# Patient Record
Sex: MALE | Race: WHITE | Employment: FULL TIME | URBAN - NONMETROPOLITAN AREA
[De-identification: names, ages, dates, MRNs, and addresses within clinical notes are randomized per-mention and may not be internally consistent; named-entity substitution may affect disease eponyms.]

---

## 2017-01-09 ENCOUNTER — NURSE ONLY (OUTPATIENT)
Dept: INTERNAL MEDICINE CLINIC | Age: 57
End: 2017-01-09

## 2017-01-09 DIAGNOSIS — Z11.59 NEED FOR HEPATITIS C SCREENING TEST: ICD-10-CM

## 2017-01-09 DIAGNOSIS — Z11.4 SCREENING FOR HIV (HUMAN IMMUNODEFICIENCY VIRUS): ICD-10-CM

## 2017-01-09 DIAGNOSIS — I10 ESSENTIAL HYPERTENSION: ICD-10-CM

## 2017-01-09 DIAGNOSIS — Z00.00 ROUTINE HEALTH MAINTENANCE: ICD-10-CM

## 2017-01-09 DIAGNOSIS — Z12.5 SCREENING FOR PROSTATE CANCER: ICD-10-CM

## 2017-01-09 LAB
A/G RATIO: 1.7 (ref 1.1–2.2)
ALBUMIN SERPL-MCNC: 4.4 G/DL (ref 3.4–5)
ALP BLD-CCNC: 62 U/L (ref 40–129)
ALT SERPL-CCNC: 21 U/L (ref 10–40)
ANION GAP SERPL CALCULATED.3IONS-SCNC: 13 MMOL/L (ref 3–16)
AST SERPL-CCNC: 19 U/L (ref 15–37)
BASOPHILS ABSOLUTE: 0.1 K/UL (ref 0–0.2)
BASOPHILS RELATIVE PERCENT: 1 %
BILIRUB SERPL-MCNC: 0.5 MG/DL (ref 0–1)
BUN BLDV-MCNC: 15 MG/DL (ref 7–20)
CALCIUM SERPL-MCNC: 9.5 MG/DL (ref 8.3–10.6)
CHLORIDE BLD-SCNC: 98 MMOL/L (ref 99–110)
CHOLESTEROL, TOTAL: 198 MG/DL (ref 0–199)
CO2: 25 MMOL/L (ref 21–32)
CREAT SERPL-MCNC: 1 MG/DL (ref 0.9–1.3)
EOSINOPHILS ABSOLUTE: 0.2 K/UL (ref 0–0.6)
EOSINOPHILS RELATIVE PERCENT: 2.3 %
GFR AFRICAN AMERICAN: >60
GFR NON-AFRICAN AMERICAN: >60
GLOBULIN: 2.6 G/DL
GLUCOSE BLD-MCNC: 100 MG/DL (ref 70–99)
HCT VFR BLD CALC: 47.2 % (ref 40.5–52.5)
HDLC SERPL-MCNC: 59 MG/DL (ref 40–60)
HEMOGLOBIN: 15.9 G/DL (ref 13.5–17.5)
HEPATITIS C ANTIBODY INTERPRETATION: NORMAL
LDL CHOLESTEROL CALCULATED: 111 MG/DL
LYMPHOCYTES ABSOLUTE: 2.4 K/UL (ref 1–5.1)
LYMPHOCYTES RELATIVE PERCENT: 22.6 %
MCH RBC QN AUTO: 30.6 PG (ref 26–34)
MCHC RBC AUTO-ENTMCNC: 33.6 G/DL (ref 31–36)
MCV RBC AUTO: 91.2 FL (ref 80–100)
MONOCYTES ABSOLUTE: 1 K/UL (ref 0–1.3)
MONOCYTES RELATIVE PERCENT: 9.8 %
NEUTROPHILS ABSOLUTE: 6.7 K/UL (ref 1.7–7.7)
NEUTROPHILS RELATIVE PERCENT: 64.3 %
PDW BLD-RTO: 13.9 % (ref 12.4–15.4)
PLATELET # BLD: 236 K/UL (ref 135–450)
PMV BLD AUTO: 8.8 FL (ref 5–10.5)
POTASSIUM SERPL-SCNC: 4.9 MMOL/L (ref 3.5–5.1)
PROSTATE SPECIFIC ANTIGEN: 0.49 NG/ML (ref 0–4)
RBC # BLD: 5.17 M/UL (ref 4.2–5.9)
SODIUM BLD-SCNC: 136 MMOL/L (ref 136–145)
TOTAL PROTEIN: 7 G/DL (ref 6.4–8.2)
TRIGL SERPL-MCNC: 141 MG/DL (ref 0–150)
TSH REFLEX FT4: 1.84 UIU/ML (ref 0.27–4.2)
VLDLC SERPL CALC-MCNC: 28 MG/DL
WBC # BLD: 10.4 K/UL (ref 4–11)

## 2017-01-09 PROCEDURE — 36415 COLL VENOUS BLD VENIPUNCTURE: CPT | Performed by: INTERNAL MEDICINE

## 2017-01-10 LAB — HIV-1 AND HIV-2 ANTIBODIES: NORMAL

## 2017-01-12 ENCOUNTER — NURSE ONLY (OUTPATIENT)
Dept: INTERNAL MEDICINE CLINIC | Age: 57
End: 2017-01-12

## 2017-01-12 DIAGNOSIS — D68.2 FACTOR V DEFICIENCY (HCC): Primary | ICD-10-CM

## 2017-01-12 LAB
INTERNATIONAL NORMALIZATION RATIO, POC: 1.7
PROTHROMBIN TIME, POC: ABNORMAL

## 2017-01-12 PROCEDURE — 85610 PROTHROMBIN TIME: CPT | Performed by: INTERNAL MEDICINE

## 2017-01-23 ENCOUNTER — OFFICE VISIT (OUTPATIENT)
Dept: INTERNAL MEDICINE CLINIC | Age: 57
End: 2017-01-23

## 2017-01-23 VITALS
WEIGHT: 282 LBS | SYSTOLIC BLOOD PRESSURE: 132 MMHG | DIASTOLIC BLOOD PRESSURE: 87 MMHG | HEART RATE: 78 BPM | RESPIRATION RATE: 14 BRPM | OXYGEN SATURATION: 96 % | BODY MASS INDEX: 37.37 KG/M2 | HEIGHT: 73 IN

## 2017-01-23 DIAGNOSIS — E78.00 HYPERCHOLESTEROLEMIA: ICD-10-CM

## 2017-01-23 DIAGNOSIS — R73.01 IMPAIRED FASTING GLUCOSE: ICD-10-CM

## 2017-01-23 DIAGNOSIS — E66.09 NON MORBID OBESITY DUE TO EXCESS CALORIES: ICD-10-CM

## 2017-01-23 DIAGNOSIS — I10 ESSENTIAL HYPERTENSION: ICD-10-CM

## 2017-01-23 DIAGNOSIS — D68.2 FACTOR V DEFICIENCY (HCC): Primary | ICD-10-CM

## 2017-01-23 LAB
INTERNATIONAL NORMALIZATION RATIO, POC: 3.3
PROTHROMBIN TIME, POC: ABNORMAL

## 2017-01-23 PROCEDURE — 99214 OFFICE O/P EST MOD 30 MIN: CPT | Performed by: INTERNAL MEDICINE

## 2017-01-23 PROCEDURE — 85610 PROTHROMBIN TIME: CPT | Performed by: INTERNAL MEDICINE

## 2017-02-02 ENCOUNTER — NURSE ONLY (OUTPATIENT)
Dept: INTERNAL MEDICINE CLINIC | Age: 57
End: 2017-02-02

## 2017-02-02 DIAGNOSIS — D68.2 FACTOR V DEFICIENCY (HCC): Primary | ICD-10-CM

## 2017-02-02 LAB
INTERNATIONAL NORMALIZATION RATIO, POC: 2.2
PROTHROMBIN TIME, POC: NORMAL

## 2017-02-02 PROCEDURE — 85610 PROTHROMBIN TIME: CPT | Performed by: INTERNAL MEDICINE

## 2017-03-02 DIAGNOSIS — D68.2 FACTOR V DEFICIENCY (HCC): Primary | ICD-10-CM

## 2017-03-08 ENCOUNTER — TELEPHONE (OUTPATIENT)
Dept: INTERNAL MEDICINE CLINIC | Age: 57
End: 2017-03-08

## 2017-03-08 DIAGNOSIS — D68.2 FACTOR V DEFICIENCY (HCC): Primary | ICD-10-CM

## 2017-03-14 ENCOUNTER — TELEPHONE (OUTPATIENT)
Age: 57
End: 2017-03-14

## 2017-03-17 ENCOUNTER — TELEPHONE (OUTPATIENT)
Age: 57
End: 2017-03-17

## 2017-03-21 ENCOUNTER — HOSPITAL ENCOUNTER (OUTPATIENT)
Dept: OTHER | Age: 57
Discharge: HOME | End: 2017-03-31
Attending: INTERNAL MEDICINE | Admitting: INTERNAL MEDICINE

## 2017-03-21 LAB
POC INR: 2.6 INDEX
PROTHROMBIN TIME, POC: 30.6 SECONDS (ref 10–14.3)

## 2017-04-01 ENCOUNTER — HOSPITAL ENCOUNTER (OUTPATIENT)
Dept: OTHER | Age: 57
Discharge: HOME | End: 2017-04-30
Attending: INTERNAL MEDICINE | Admitting: INTERNAL MEDICINE

## 2017-04-18 ENCOUNTER — ANTI-COAG VISIT (OUTPATIENT)
Age: 57
End: 2017-04-18

## 2017-04-18 DIAGNOSIS — D68.2 FACTOR V DEFICIENCY (HCC): ICD-10-CM

## 2017-04-18 LAB
INR BLD: 2.4
POC INR: 2.4 INDEX
PROTHROMBIN TIME, POC: 29.1 SECONDS (ref 10–14.3)
PROTIME: 29.1 SECONDS

## 2017-04-24 ENCOUNTER — OFFICE VISIT (OUTPATIENT)
Dept: INTERNAL MEDICINE CLINIC | Age: 57
End: 2017-04-24

## 2017-04-24 VITALS
OXYGEN SATURATION: 99 % | BODY MASS INDEX: 37.77 KG/M2 | HEART RATE: 82 BPM | RESPIRATION RATE: 16 BRPM | DIASTOLIC BLOOD PRESSURE: 85 MMHG | SYSTOLIC BLOOD PRESSURE: 145 MMHG | WEIGHT: 285 LBS | HEIGHT: 73 IN

## 2017-04-24 DIAGNOSIS — I10 ESSENTIAL HYPERTENSION: Primary | ICD-10-CM

## 2017-04-24 DIAGNOSIS — E66.09 NON MORBID OBESITY DUE TO EXCESS CALORIES: ICD-10-CM

## 2017-04-24 DIAGNOSIS — Z12.11 COLON CANCER SCREENING: ICD-10-CM

## 2017-04-24 DIAGNOSIS — E78.00 HYPERCHOLESTEROLEMIA: ICD-10-CM

## 2017-04-24 DIAGNOSIS — R21 RASH: ICD-10-CM

## 2017-04-24 PROCEDURE — 99214 OFFICE O/P EST MOD 30 MIN: CPT | Performed by: INTERNAL MEDICINE

## 2017-04-24 RX ORDER — TRIAMCINOLONE ACETONIDE 1 MG/G
CREAM TOPICAL 2 TIMES DAILY
Qty: 1 TUBE | Refills: 3 | Status: SHIPPED | OUTPATIENT
Start: 2017-04-24 | End: 2018-06-12

## 2017-04-24 RX ORDER — TRIAMCINOLONE ACETONIDE 1 MG/G
CREAM TOPICAL 2 TIMES DAILY
COMMUNITY
End: 2017-04-24 | Stop reason: SDUPTHER

## 2017-05-01 ENCOUNTER — HOSPITAL ENCOUNTER (OUTPATIENT)
Dept: OTHER | Age: 57
Discharge: HOME | End: 2017-05-31
Attending: INTERNAL MEDICINE | Admitting: INTERNAL MEDICINE

## 2017-05-08 ENCOUNTER — NURSE ONLY (OUTPATIENT)
Dept: INTERNAL MEDICINE CLINIC | Age: 57
End: 2017-05-08

## 2017-05-08 VITALS — SYSTOLIC BLOOD PRESSURE: 137 MMHG | HEART RATE: 90 BPM | DIASTOLIC BLOOD PRESSURE: 90 MMHG

## 2017-05-08 DIAGNOSIS — I10 ESSENTIAL HYPERTENSION: Primary | ICD-10-CM

## 2017-05-08 PROCEDURE — 99999 PR OFFICE/OUTPT VISIT,PROCEDURE ONLY: CPT | Performed by: INTERNAL MEDICINE

## 2017-05-16 ENCOUNTER — ANTI-COAG VISIT (OUTPATIENT)
Age: 57
End: 2017-05-16

## 2017-05-16 DIAGNOSIS — D68.2 FACTOR V DEFICIENCY (HCC): ICD-10-CM

## 2017-05-16 LAB
INR BLD: 2.5
POC INR: 2.5 INDEX
PROTHROMBIN TIME, POC: 29.8 SECONDS (ref 10–14.3)
PROTIME: 29.8 SECONDS

## 2017-06-01 ENCOUNTER — HOSPITAL ENCOUNTER (OUTPATIENT)
Dept: OTHER | Age: 57
Discharge: HOME | End: 2017-06-30
Attending: INTERNAL MEDICINE | Admitting: INTERNAL MEDICINE

## 2017-06-13 ENCOUNTER — ANTI-COAG VISIT (OUTPATIENT)
Age: 57
End: 2017-06-13

## 2017-06-13 DIAGNOSIS — D68.2 FACTOR V DEFICIENCY (HCC): ICD-10-CM

## 2017-06-13 LAB
INR BLD: 1.8
POC INR: 1.8 INDEX
PROTHROMBIN TIME, POC: 22.2 SECONDS (ref 10–14.3)
PROTIME: 22.2 SECONDS

## 2017-06-21 DIAGNOSIS — D68.2 FACTOR V DEFICIENCY (HCC): ICD-10-CM

## 2017-06-21 RX ORDER — WARFARIN SODIUM 7.5 MG/1
7.5 TABLET ORAL DAILY
Qty: 90 TABLET | Refills: 1 | Status: SHIPPED | OUTPATIENT
Start: 2017-06-21 | End: 2017-11-20 | Stop reason: SDUPTHER

## 2017-07-01 ENCOUNTER — HOSPITAL ENCOUNTER (OUTPATIENT)
Dept: OTHER | Age: 57
Discharge: HOME | End: 2017-07-31
Attending: INTERNAL MEDICINE | Admitting: INTERNAL MEDICINE

## 2017-07-11 ENCOUNTER — ANTI-COAG VISIT (OUTPATIENT)
Age: 57
End: 2017-07-11

## 2017-07-11 DIAGNOSIS — D68.2 FACTOR V DEFICIENCY (HCC): ICD-10-CM

## 2017-07-11 LAB
INR BLD: 2.2
POC INR: 2.2 INDEX
PROTHROMBIN TIME, POC: 26.3 SECONDS (ref 10–14.3)
PROTIME: 26.3 SECONDS

## 2017-07-17 ENCOUNTER — OFFICE VISIT (OUTPATIENT)
Dept: INTERNAL MEDICINE CLINIC | Age: 57
End: 2017-07-17

## 2017-07-17 VITALS
OXYGEN SATURATION: 98 % | BODY MASS INDEX: 35.52 KG/M2 | DIASTOLIC BLOOD PRESSURE: 80 MMHG | HEIGHT: 73 IN | WEIGHT: 268 LBS | RESPIRATION RATE: 16 BRPM | SYSTOLIC BLOOD PRESSURE: 117 MMHG | HEART RATE: 91 BPM

## 2017-07-17 DIAGNOSIS — M25.562 CHRONIC PAIN OF BOTH KNEES: ICD-10-CM

## 2017-07-17 DIAGNOSIS — I10 ESSENTIAL HYPERTENSION: Primary | ICD-10-CM

## 2017-07-17 DIAGNOSIS — D68.2 FACTOR V DEFICIENCY (HCC): ICD-10-CM

## 2017-07-17 DIAGNOSIS — E66.09 NON MORBID OBESITY DUE TO EXCESS CALORIES: ICD-10-CM

## 2017-07-17 DIAGNOSIS — E78.00 HYPERCHOLESTEROLEMIA: ICD-10-CM

## 2017-07-17 DIAGNOSIS — G89.29 CHRONIC PAIN OF BOTH KNEES: ICD-10-CM

## 2017-07-17 DIAGNOSIS — M25.561 CHRONIC PAIN OF BOTH KNEES: ICD-10-CM

## 2017-07-17 PROCEDURE — 99214 OFFICE O/P EST MOD 30 MIN: CPT | Performed by: INTERNAL MEDICINE

## 2017-07-17 RX ORDER — TRAMADOL HYDROCHLORIDE 50 MG/1
50 TABLET ORAL DAILY PRN
Qty: 30 TABLET | Refills: 1 | Status: SHIPPED | OUTPATIENT
Start: 2017-07-17 | End: 2017-08-16

## 2017-07-17 RX ORDER — AMLODIPINE BESYLATE 10 MG/1
10 TABLET ORAL DAILY
Qty: 90 TABLET | Refills: 1 | Status: SHIPPED | OUTPATIENT
Start: 2017-07-17 | End: 2017-11-20 | Stop reason: SDUPTHER

## 2017-07-17 RX ORDER — LISINOPRIL AND HYDROCHLOROTHIAZIDE 25; 20 MG/1; MG/1
1 TABLET ORAL 2 TIMES DAILY
Qty: 180 TABLET | Refills: 1 | Status: SHIPPED | OUTPATIENT
Start: 2017-07-17 | End: 2017-11-20 | Stop reason: SDUPTHER

## 2017-08-01 ENCOUNTER — HOSPITAL ENCOUNTER (OUTPATIENT)
Dept: OTHER | Age: 57
Discharge: HOME | End: 2017-08-31
Attending: INTERNAL MEDICINE | Admitting: INTERNAL MEDICINE

## 2017-08-08 ENCOUNTER — ANTI-COAG VISIT (OUTPATIENT)
Age: 57
End: 2017-08-08

## 2017-08-08 DIAGNOSIS — D68.2 FACTOR V DEFICIENCY (HCC): ICD-10-CM

## 2017-08-08 LAB
INR BLD: 2.2
POC INR: 2.1 INDEX
PROTHROMBIN TIME, POC: 24.6 SECONDS (ref 10–14.3)
PROTIME: 24.6 SECONDS

## 2017-09-01 ENCOUNTER — HOSPITAL ENCOUNTER (OUTPATIENT)
Dept: OTHER | Age: 57
Discharge: HOME | End: 2017-09-30
Attending: INTERNAL MEDICINE | Admitting: INTERNAL MEDICINE

## 2017-09-05 ENCOUNTER — ANTI-COAG VISIT (OUTPATIENT)
Age: 57
End: 2017-09-05

## 2017-09-05 DIAGNOSIS — D68.2 FACTOR V DEFICIENCY (HCC): ICD-10-CM

## 2017-09-05 LAB
INR BLD: 2.1
POC INR: 2.1 INDEX
PROTHROMBIN TIME, POC: 25.3 SECONDS (ref 10–14.3)
PROTIME: 25.3 SECONDS

## 2017-09-13 ENCOUNTER — TELEPHONE (OUTPATIENT)
Dept: INTERNAL MEDICINE CLINIC | Age: 57
End: 2017-09-13

## 2017-09-18 ENCOUNTER — OFFICE VISIT (OUTPATIENT)
Dept: INTERNAL MEDICINE CLINIC | Age: 57
End: 2017-09-18

## 2017-09-18 VITALS
SYSTOLIC BLOOD PRESSURE: 119 MMHG | DIASTOLIC BLOOD PRESSURE: 80 MMHG | HEART RATE: 99 BPM | WEIGHT: 265 LBS | OXYGEN SATURATION: 98 % | RESPIRATION RATE: 16 BRPM | BODY MASS INDEX: 35.12 KG/M2 | HEIGHT: 73 IN

## 2017-09-18 DIAGNOSIS — M54.41 ACUTE MIDLINE LOW BACK PAIN WITH BILATERAL SCIATICA: Primary | ICD-10-CM

## 2017-09-18 DIAGNOSIS — M54.42 ACUTE MIDLINE LOW BACK PAIN WITH BILATERAL SCIATICA: Primary | ICD-10-CM

## 2017-09-18 DIAGNOSIS — M54.16 LUMBAR RADICULOPATHY: ICD-10-CM

## 2017-09-18 PROCEDURE — 99214 OFFICE O/P EST MOD 30 MIN: CPT | Performed by: INTERNAL MEDICINE

## 2017-09-18 RX ORDER — GABAPENTIN 300 MG/1
300 CAPSULE ORAL 3 TIMES DAILY
Qty: 90 CAPSULE | Refills: 3 | Status: SHIPPED | OUTPATIENT
Start: 2017-09-18 | End: 2018-01-29 | Stop reason: SDUPTHER

## 2017-09-23 ENCOUNTER — HOSPITAL ENCOUNTER (OUTPATIENT)
Dept: CT IMAGING | Age: 57
Discharge: HOME | End: 2017-09-23
Attending: INTERNAL MEDICINE | Admitting: INTERNAL MEDICINE

## 2017-09-23 DIAGNOSIS — M54.42 LOW BACK PAIN WITH LEFT-SIDED SCIATICA: ICD-10-CM

## 2017-09-23 DIAGNOSIS — M54.42 ACUTE BILATERAL LOW BACK PAIN WITH BILATERAL SCIATICA: ICD-10-CM

## 2017-09-23 DIAGNOSIS — M54.41 ACUTE BILATERAL LOW BACK PAIN WITH BILATERAL SCIATICA: ICD-10-CM

## 2017-09-27 ENCOUNTER — TELEPHONE (OUTPATIENT)
Dept: OTHER | Age: 57
End: 2017-09-27

## 2017-09-27 DIAGNOSIS — M48.00 CENTRAL STENOSIS OF SPINAL CANAL: ICD-10-CM

## 2017-09-27 NOTE — TELEPHONE ENCOUNTER
Please inform the patient that he has moderate spinal canal stenosis at L1-L2 and L4 - L5 levels. I will refer him to neurosurgeon Dr. Efra Groves.

## 2017-10-01 ENCOUNTER — HOSPITAL ENCOUNTER (OUTPATIENT)
Dept: OTHER | Age: 57
Discharge: HOME | End: 2017-10-31
Attending: INTERNAL MEDICINE | Admitting: INTERNAL MEDICINE

## 2017-10-02 ENCOUNTER — OFFICE VISIT (OUTPATIENT)
Dept: INTERNAL MEDICINE CLINIC | Age: 57
End: 2017-10-02

## 2017-10-02 VITALS
HEIGHT: 73 IN | HEART RATE: 98 BPM | WEIGHT: 264.4 LBS | RESPIRATION RATE: 14 BRPM | OXYGEN SATURATION: 98 % | BODY MASS INDEX: 35.04 KG/M2 | SYSTOLIC BLOOD PRESSURE: 138 MMHG | DIASTOLIC BLOOD PRESSURE: 78 MMHG

## 2017-10-02 DIAGNOSIS — M54.16 LUMBAR RADICULOPATHY: Primary | ICD-10-CM

## 2017-10-02 DIAGNOSIS — M48.00 CENTRAL STENOSIS OF SPINAL CANAL: ICD-10-CM

## 2017-10-02 DIAGNOSIS — M51.36 DDD (DEGENERATIVE DISC DISEASE), LUMBAR: ICD-10-CM

## 2017-10-02 PROBLEM — M51.369 DDD (DEGENERATIVE DISC DISEASE), LUMBAR: Status: ACTIVE | Noted: 2017-10-02

## 2017-10-02 PROCEDURE — 99213 OFFICE O/P EST LOW 20 MIN: CPT | Performed by: INTERNAL MEDICINE

## 2017-10-02 RX ORDER — TRAMADOL HYDROCHLORIDE 50 MG/1
50 TABLET ORAL EVERY 6 HOURS PRN
COMMUNITY
End: 2020-12-17 | Stop reason: ALTCHOICE

## 2017-10-02 NOTE — PROGRESS NOTES
Right Arm, Position: Sitting, Cuff Size: Small Adult)  Pulse 98  Resp 14  Ht 6' 1\" (1.854 m)  Wt 264 lb 6.4 oz (119.9 kg)  SpO2 98%  BMI 34.88 kg/m2     GENERAL APPEARANCE:    Alert, oriented x 3, well developed, cooperative, not in any distress, appears stated age. HEAD:   Normocephalic, atraumatic   EYES:   Pupils are equal and round, EOMI, lids normal, conjuctivea clear, sclera anicteric. NECK:    Supple, symmetrical,  trachea midline, no thyromegaly, no lymphadenopathy. LUNGS:    Clear to auscultation bilaterally, respirations unlabored, accessory muscles are not used. HEART:     Regular rate and rhythm, S1 and S2 normal, no murmur, rub or gallop. PMI in MCL. ABDOMEN:    Soft, non-tender, bowel sounds are normoactive, no masses, no hepatospleenomegaly. EXTREMITY:   no cyanosis, clubbing. + trace bipedal edema. Positive varicose veins. PSYCH:  Mood euthymic, insight and judgement good, no SI or HI.  NEURO/MSK:  no tenderness over LS spine. Positive tenderness over right SI joint. Gait steady. ASSESSMENT/PLAN:      ICD-10-CM ICD-9-CM    1. Lumbar radiculopathy M54.16 724.4    2. DDD (degenerative disc disease), lumbar M51.36 722.52    3. Central stenosis of spinal canal M48.00 724.00          Lumbar radiculopathy secondary to DDD of lumbar spine: Continue gabapentin. Has been referred to neurosurgery. Health Maintenance Due   Topic Date Due    Colon cancer screen colonoscopy  01/10/2011    Flu vaccine (1) 09/01/2017     . Care discussed with patient. Questions answered and patient verbalizes understanding and agrees with plan. Medications reviewed and reconciled. Continue current medications. Appropriate prescriptions are ordered. Risks and benefits of meds are discussed. After visit summary provided. Advised to call for any problems, questions, or concerns. If symptoms worsen or don't improve as expected, to call us or go to ER.     Follow up as directed, sooner if

## 2017-10-03 ENCOUNTER — ANTI-COAG VISIT (OUTPATIENT)
Age: 57
End: 2017-10-03

## 2017-10-03 DIAGNOSIS — D68.2 FACTOR V DEFICIENCY (HCC): ICD-10-CM

## 2017-10-03 LAB
INR BLD: 2
POC INR: 2 INDEX
PROTHROMBIN TIME, POC: 24.3 SECONDS (ref 10–14.3)
PROTIME: 24.3 SECONDS

## 2017-10-03 NOTE — MR AVS SNAPSHOT
After Visit Summary             Charles Rubin   10/3/2017 5:00 PM   Anti-coag visit    Description:  Male : 1960   Provider:  GALD[facility]   Department:  [facility]              Your Follow-Up and Future Appointments         Below is a list of your follow-up and future appointments. This may not be a complete list as you may have made appointments directly with providers that we are not aware of or your providers may have made some for you. Please call your providers to confirm appointments. It is important to keep your appointments. Please bring your current insurance card, photo ID, co-pay, and all medication bottles to your appointment. If self-pay, payment is expected at the time of service. Your To-Do List     Future Appointments Provider Department Dept Phone    10/31/2017 5:00 PM [address] [place] [address] [phone]    11/15/2017 4:45 PM Sydney Schmid [facility] [phone]    If this is a fasting lab, please do not eat or drink past midnight other than water. 2017 4:30 PM Adilene Casper MD [facility] Internal Medicine [phone]    Please arrive 15 minutes prior to appointment, bring photo ID and insurance card. Information from Your Visit        Department     Name Address Phone Fax    HEATHER/ Neri [address] [phone] [facility] [address] [phone]      You Were Seen for:         Comments    Factor V deficiency [facility])   [[id number]]         Anticoagulation Summary as of 10/3/2017              Today's INR 2    Next INR check 10/31/2017      Vital Signs     Smoking Status                   Former Smoker           Additional Information about your Body Mass Index (BMI)           Your BMI as listed above is considered obese (30 or more).  BMI is an

## 2017-10-03 NOTE — PROGRESS NOTES
Patient verifies current dosing regimen as listed above. Patient denies any missed or extra doses. Patient denies any unusual bruising/bleeding/swelling/SOB. Patient states he has increased intake of vitamin K in his diet. Patient denies any changes in prescription/OTC/herbal medications. Patient denies recent falls. Continue warfarin 7.5mg and return to clinic in 4 weeks. Dosing reminder sheet given with phone number, appointment date, and time.

## 2017-10-31 ENCOUNTER — ANTI-COAG VISIT (OUTPATIENT)
Age: 57
End: 2017-10-31

## 2017-10-31 DIAGNOSIS — D68.2 FACTOR V DEFICIENCY (HCC): ICD-10-CM

## 2017-10-31 LAB
INR BLD: 2.5
POC INR: 2.5 INDEX
PROTHROMBIN TIME, POC: 29.5 SECONDS (ref 10–14.3)
PROTIME: 29.5 SECONDS

## 2017-10-31 NOTE — PROGRESS NOTES
Patient verifies current dosing regimen as listed above. Patient denies any missed or extra doses. Patient denies any unusual bruising/bleeding/swelling/SOB. Patient denies changes in diet involving vitamin K. Patient denies any changes in prescription/OTC/herbal medications. Patient denies recent falls. Continue warfarin 7.5mg daily and return to clinic in 4 weeks. Dosing reminder sheet given with phone number, appointment date, and time.

## 2017-11-01 ENCOUNTER — HOSPITAL ENCOUNTER (OUTPATIENT)
Dept: OTHER | Age: 57
Discharge: HOME | End: 2017-11-30
Attending: INTERNAL MEDICINE | Admitting: INTERNAL MEDICINE

## 2017-11-15 ENCOUNTER — NURSE ONLY (OUTPATIENT)
Dept: INTERNAL MEDICINE CLINIC | Age: 57
End: 2017-11-15

## 2017-11-15 DIAGNOSIS — Z23 NEEDS FLU SHOT: Primary | ICD-10-CM

## 2017-11-15 DIAGNOSIS — R79.89 CREATININE ELEVATION: ICD-10-CM

## 2017-11-15 PROCEDURE — 90471 IMMUNIZATION ADMIN: CPT | Performed by: INTERNAL MEDICINE

## 2017-11-15 PROCEDURE — 90686 IIV4 VACC NO PRSV 0.5 ML IM: CPT | Performed by: INTERNAL MEDICINE

## 2017-11-15 PROCEDURE — 36415 COLL VENOUS BLD VENIPUNCTURE: CPT | Performed by: INTERNAL MEDICINE

## 2017-11-16 LAB
ANION GAP SERPL CALCULATED.3IONS-SCNC: 16 MMOL/L (ref 3–16)
BUN BLDV-MCNC: 15 MG/DL (ref 7–20)
CALCIUM SERPL-MCNC: 9.9 MG/DL (ref 8.3–10.6)
CHLORIDE BLD-SCNC: 95 MMOL/L (ref 99–110)
CO2: 27 MMOL/L (ref 21–32)
CREAT SERPL-MCNC: 0.9 MG/DL (ref 0.9–1.3)
GFR AFRICAN AMERICAN: >60
GFR NON-AFRICAN AMERICAN: >60
GLUCOSE BLD-MCNC: 97 MG/DL (ref 70–99)
POTASSIUM SERPL-SCNC: 4.9 MMOL/L (ref 3.5–5.1)
SODIUM BLD-SCNC: 138 MMOL/L (ref 136–145)

## 2017-11-20 ENCOUNTER — OFFICE VISIT (OUTPATIENT)
Dept: INTERNAL MEDICINE CLINIC | Age: 57
End: 2017-11-20

## 2017-11-20 VITALS
RESPIRATION RATE: 16 BRPM | SYSTOLIC BLOOD PRESSURE: 120 MMHG | HEART RATE: 96 BPM | HEIGHT: 73 IN | OXYGEN SATURATION: 98 % | DIASTOLIC BLOOD PRESSURE: 80 MMHG | WEIGHT: 266 LBS | BODY MASS INDEX: 35.25 KG/M2

## 2017-11-20 DIAGNOSIS — D68.2 FACTOR V DEFICIENCY (HCC): ICD-10-CM

## 2017-11-20 DIAGNOSIS — E66.09 NON MORBID OBESITY DUE TO EXCESS CALORIES: ICD-10-CM

## 2017-11-20 DIAGNOSIS — Z79.01 CHRONIC ANTICOAGULATION: ICD-10-CM

## 2017-11-20 DIAGNOSIS — Z13.29 SCREENING FOR THYROID DISORDER: ICD-10-CM

## 2017-11-20 DIAGNOSIS — I10 ESSENTIAL HYPERTENSION: Primary | ICD-10-CM

## 2017-11-20 DIAGNOSIS — E78.00 HYPERCHOLESTEROLEMIA: ICD-10-CM

## 2017-11-20 PROCEDURE — 99214 OFFICE O/P EST MOD 30 MIN: CPT | Performed by: INTERNAL MEDICINE

## 2017-11-20 RX ORDER — WARFARIN SODIUM 7.5 MG/1
7.5 TABLET ORAL DAILY
Qty: 90 TABLET | Refills: 1 | Status: SHIPPED | OUTPATIENT
Start: 2017-11-20 | End: 2018-03-20 | Stop reason: SDUPTHER

## 2017-11-20 RX ORDER — AMLODIPINE BESYLATE 10 MG/1
10 TABLET ORAL DAILY
Qty: 90 TABLET | Refills: 1 | Status: SHIPPED | OUTPATIENT
Start: 2017-11-20 | End: 2018-06-12 | Stop reason: ALTCHOICE

## 2017-11-20 RX ORDER — LISINOPRIL AND HYDROCHLOROTHIAZIDE 25; 20 MG/1; MG/1
1 TABLET ORAL 2 TIMES DAILY
Qty: 180 TABLET | Refills: 1 | Status: SHIPPED | OUTPATIENT
Start: 2017-11-20 | End: 2021-11-01 | Stop reason: SDUPTHER

## 2017-11-20 NOTE — PROGRESS NOTES
Name: Radha Pedro  [id number]  Age: 65 y.o. YOB: 1960  Sex: male    CHIEF COMPLAINT:    Chief Complaint   Patient presents with    3 Month Follow-Up     Lumbar radiculopathy, HTN, Factor 5 leiden def, HLD        HISTORY OF PRESENT ILLNESS:     This is a pleasant  65 y.o. male who is here for management of the chronic medical problems. Hypertension: stable. He is on amlodipine 10 mg daily, Zestoretic 20/25 mg bid. Patient denies any chest pain, palpitation or shortness of breath. Recent BMP is normal.     Factor V Leiden deficiency: On Coumadin 7.5 mg daily for a long time. 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 Mother has history of recurrent DVT and PE. INR is therapeutic on 10/31/2017; being monitored at Coumadin clinic. Hypercholesterolemia: , . He is eating healthier diet. Obesity: BMI is 35     Chronic bilateral knee pain: Secondary to Knee arthritis. Stable. Lumbar radiculopathy secondary to DDD lumbar spine:  Pain is well controlled with gabapentin. Patient is denying any weakness of the legs or unsteady gait. No bowel or urine incontinence. Patient has appt. With  Dr. Toma Guillen on 12/15/17. CT LS spine: Moderate spinal canal stenosis at L1-L2 and L4 - L5 levels. Recent record reviewed via [facility] with the patient.     Past Medical History:        Diagnosis Date    Factor 5 Leiden mutation, heterozygous ([facility] [address]     Hypertension     Hypertriglyceridemia 12/17/2016    Non morbid obesity due to excess calories 12/17/2016       Past Surgical History:        Procedure Laterality Date    LEG SURGERY Right 1990    LEG SURGERY Left 2007    SHOULDER SURGERY Right 2002       Social History:   Social History   Substance Use Topics    Smoking status: Former Smoker     Packs/day: 2.00     Years: 30.00     Types: Cigarettes     Quit date: 7/16/2006    Smokeless tobacco: Never Used    Alcohol use 7.2 oz/week     12 Cans of beer per week      Comment: weekly       Family History:       Problem Relation Age of Onset    Other Mother      blood clot    Coronary Art Dis Mother     No Known Problems Father     High Blood Pressure Brother        Allergies:  Review of patient's allergies indicates no known allergies. Current Medications :      Prior to Admission medications    Medication Sig Start Date End Date Taking? Authorizing Provider   lisinopril-hydrochlorothiazide (PRINZIDE;ZESTORETIC) 20-25 MG per tablet Take 1 tablet by mouth 2 times daily 11/20/17  Yes Mila Izaguirre MD   amLODIPine (NORVASC) 10 MG tablet Take 1 tablet by mouth daily 11/20/17  Yes Mila Izaguirre MD   warfarin (COUMADIN) 7.5 MG tablet Take 1 tablet by mouth daily 11/20/17  Yes Mila Izaguirre MD   traMADol (ULTRAM) 50 MG tablet Take 50 mg by mouth every 6 hours as needed for Pain   Yes Historical Provider, MD   gabapentin (NEURONTIN) 300 MG capsule Take 1 capsule by mouth 3 times daily 9/18/17  Yes Mila Izaguirre MD   triamcinolone (KENALOG) 0.1 % cream Apply topically 2 times daily Apply topically 2 times daily.  4/24/17  Yes Mila Izaguirre MD   B-Complex TABS Take 1 tablet by mouth daily   Yes Historical Provider, MD       LAB DATA:    CBC:   Lab Results   Component Value Date    WBC 10.4 01/09/2017    HGB 15.9 01/09/2017     01/09/2017     Renal:   Lab Results   Component Value Date    BUN 15 11/15/2017    CREATININE 0.9 11/15/2017     11/15/2017    K 4.9 11/15/2017     PT/INR:  No results found for: PTINR  HbA1c: No results found for: LABA1C  Urine for microalbumin:   Lab Results   Component Value Date    CREATININE 0.9 11/15/2017     LIPID:   Lab Results   Component Value Date    CHOL 198 01/09/2017    TRIG 141 01/09/2017    HDL 59 01/09/2017    LDLCALC 111 (H) 01/09/2017    LDLDIRECT 99 12/20/2012     LFT:   Lab Results   Component Value Date    ALT 21 01/09/2017    AST 19 01/09/2017     TSH:  No results found for: TSH  No results found for: excess calories E66.09 278.00    6. Screening for thyroid disorder Z13.29 V77.0 TSH WITH REFLEX TO FT4       HTN: Stable; continue current meds. Monitor electrolytes periodically. HLD:   Advised to eat low fat and low cholesterol diet. Obesity:  Advise to lose weight. Care discussed with patient. Primary prevention of coronary artery disease and stroke, by aggressive risk modification is counseled extensively, including regular exercise, weight management, and dietary restrictions (low salt, low cholesterol/fat, low carb/sugar diet) were discussed in detail. Patient verbalized understanding and will follow through. Chronic bilateral knee pain: Secondary to arthritis. Tramadol as needed. Factor V Leiden deficiency: Continue Coumadin. INR monitoring as per Coumadin clinic. Colon cancer screening:   Refer to GI- patient wants to do colonoscopy in fall. Advised to call and make an appointment now. Health Maintenance Due   Topic Date Due    Colon cancer screen colonoscopy  01/10/2011    Smoker: low dose lung CT screening  01/10/2016     . Care discussed with patient. Questions answered and patient verbalizes understanding and agrees with plan. Medications reviewed and reconciled. Continue current medications. Appropriate prescriptions are ordered. Risks and benefits of meds are discussed. After visit summary provided. Advised to call for any problems, questions, or concerns. If symptoms worsen or don't improve as expected, to call us or go to ER. Follow up as directed, sooner if needed. Return in about 6 months (around 5/20/2018). This dictation was performed with a verbal recognition program and it was checked for errors. It is possible that there are still dictated errors within this office note. Any errors should be brought immediately to my attention for correction. All efforts were made to ensure that this office note is accurate.      Marco A Horne MD  11/20/2017,

## 2017-11-28 ENCOUNTER — TELEPHONE (OUTPATIENT)
Age: 57
End: 2017-11-28

## 2017-12-01 ENCOUNTER — HOSPITAL ENCOUNTER (OUTPATIENT)
Dept: OTHER | Age: 57
Discharge: HOME | End: 2017-12-31
Attending: INTERNAL MEDICINE | Admitting: INTERNAL MEDICINE

## 2017-12-05 ENCOUNTER — ANTI-COAG VISIT (OUTPATIENT)
Age: 57
End: 2017-12-05

## 2017-12-05 DIAGNOSIS — D68.2 FACTOR V DEFICIENCY (HCC): ICD-10-CM

## 2017-12-05 LAB
INR BLD: 2.1
POC INR: 2.1 INDEX
PROTHROMBIN TIME, POC: 25.7 SECONDS (ref 10–14.3)
PROTIME: 25.7 SECONDS

## 2018-01-01 ENCOUNTER — HOSPITAL ENCOUNTER (OUTPATIENT)
Dept: OTHER | Age: 58
Discharge: HOME | End: 2018-01-31
Attending: INTERNAL MEDICINE | Admitting: INTERNAL MEDICINE

## 2018-01-02 ENCOUNTER — ANTI-COAG VISIT (OUTPATIENT)
Age: 58
End: 2018-01-02

## 2018-01-02 DIAGNOSIS — D68.2 FACTOR V DEFICIENCY (HCC): ICD-10-CM

## 2018-01-02 LAB
INR BLD: 3
POC INR: 3 INDEX
PROTHROMBIN TIME, POC: 36 SECONDS (ref 10–14.3)
PROTIME: 36 SECONDS

## 2018-01-25 ENCOUNTER — HOSPITAL ENCOUNTER (OUTPATIENT)
Dept: GENERAL RADIOLOGY | Age: 58
Discharge: HOME | End: 2018-01-25
Attending: PAIN MEDICINE | Admitting: PAIN MEDICINE

## 2018-01-25 DIAGNOSIS — M16.11 ARTHRITIS OF RIGHT HIP: ICD-10-CM

## 2018-01-29 DIAGNOSIS — M54.16 LUMBAR RADICULOPATHY: ICD-10-CM

## 2018-01-30 ENCOUNTER — TELEPHONE (OUTPATIENT)
Age: 58
End: 2018-01-30

## 2018-01-31 RX ORDER — GABAPENTIN 300 MG/1
300 CAPSULE ORAL 3 TIMES DAILY
Qty: 90 CAPSULE | Refills: 0 | Status: SHIPPED | OUTPATIENT
Start: 2018-01-31 | End: 2020-03-10 | Stop reason: ALTCHOICE

## 2018-02-01 ENCOUNTER — HOSPITAL ENCOUNTER (OUTPATIENT)
Dept: OTHER | Age: 58
Discharge: HOME | End: 2018-02-28
Attending: INTERNAL MEDICINE | Admitting: INTERNAL MEDICINE

## 2018-02-02 ENCOUNTER — ANTI-COAG VISIT (OUTPATIENT)
Age: 58
End: 2018-02-02

## 2018-02-02 DIAGNOSIS — D68.2 FACTOR V DEFICIENCY (HCC): ICD-10-CM

## 2018-02-02 LAB
INR BLD: 2.8
POC INR: 2.8 INDEX
PROTHROMBIN TIME, POC: 33 SECONDS (ref 10–14.3)
PROTIME: 33 SECONDS

## 2018-02-06 ENCOUNTER — TELEPHONE (OUTPATIENT)
Age: 58
End: 2018-02-06

## 2018-02-07 ENCOUNTER — TELEPHONE (OUTPATIENT)
Dept: FAMILY MEDICINE CLINIC | Age: 58
End: 2018-02-07

## 2018-02-07 NOTE — TELEPHONE ENCOUNTER
Attempted to explain the below directions to the patient. He stated he does not do lovenox injections and has handled this in the past himself. He states he appreciates our concern but its not a big deal and that he will take care of it. Patient was advised I would update Dr. Timmy Maciel.

## 2018-02-07 NOTE — TELEPHONE ENCOUNTER
I spoke to the patient yesterday. He wants to just stop the coumadin for his procedure. I asked him to get advice from his hematologist. Patient stated that he saw hematologist many years ago. He does not want me to refer him to hematologist now. I told him that I will talk to one of the hematologists here in [place] for advice. After long discussion he finally agreed with the plan. Patient is going for intra-articular steroid hip injection on February 12 as per the staff from Dr. Juni Dhaliwal office. I'm waiting for Dr. Juni Dhaliwal call. Patient has factor V Leiden deficiency with history of recurrent DVT and is on Coumadin for life. Called and spoke to Dr. Miriam Mays.  He suggested to stop Coumadin tonight and start Lovenox prophylactic dose from tomorrow and give last dose 24 hour before the procedure (i.e. On morning of 2/11/18). To resume both Coumadin and prophylactic dose of Lovenox 24 hour after the procedure. Give Lovenox daily for 3 days. INR on day 3 after the procedure.   --------------------------------------------------------------------    Please advise the patient to: To stop Coumadin tonight and start daily Lovenox subcutaneous injection every morning starting from tomorrow and give last on the  2/11/18 (stopping 24 hours before the procedure). Resume both Coumadin ( usual dose) and Lovenox injection 24 hours after the procedure (i.e. From 2/13/18). Give Lovenox daily in the morning for 3 days. Continue coumadin as usaual.  INR on day 3 after the procedure. I have sent the Lovenox prescription to his local pharmacy. If he needs help to learn how to administer injection, please ask him to come to the office and teach him. Please notify me immediately if the patient does not agree with the above instructions. Also politely tell him that I will not be able to continue to provide him care and make appt with another provider if he wants.     Yossi Caal

## 2018-02-09 ENCOUNTER — HOSPITAL ENCOUNTER (OUTPATIENT)
Dept: LAB | Age: 58
Discharge: HOME | End: 2018-02-09
Attending: PAIN MEDICINE | Admitting: PAIN MEDICINE

## 2018-02-09 LAB
INR BLD: 1.91 INDEX
PROTHROMBIN TIME: 21.6 SECONDS (ref 10–14.3)

## 2018-02-14 ENCOUNTER — TELEPHONE (OUTPATIENT)
Age: 58
End: 2018-02-14

## 2018-02-23 ENCOUNTER — ANTI-COAG VISIT (OUTPATIENT)
Age: 58
End: 2018-02-23

## 2018-02-23 DIAGNOSIS — D68.2 FACTOR V DEFICIENCY (HCC): ICD-10-CM

## 2018-03-01 ENCOUNTER — HOSPITAL ENCOUNTER (OUTPATIENT)
Dept: OTHER | Age: 58
Discharge: HOME | End: 2018-03-31
Attending: INTERNAL MEDICINE | Admitting: INTERNAL MEDICINE

## 2018-03-20 ENCOUNTER — ANTI-COAG VISIT (OUTPATIENT)
Age: 58
End: 2018-03-20

## 2018-03-20 DIAGNOSIS — D68.2 FACTOR V DEFICIENCY (HCC): ICD-10-CM

## 2018-03-20 LAB
INR BLD: 2.2
POC INR: 2.2 INDEX
PROTHROMBIN TIME, POC: 26.2 SECONDS (ref 10–14.3)
PROTIME: 26.2 SECONDS

## 2018-03-20 RX ORDER — WARFARIN SODIUM 7.5 MG/1
7.5 TABLET ORAL DAILY
Qty: 90 TABLET | Refills: 3 | Status: SHIPPED | OUTPATIENT
Start: 2018-03-20 | End: 2018-06-12 | Stop reason: DRUGHIGH

## 2018-04-01 ENCOUNTER — HOSPITAL ENCOUNTER (OUTPATIENT)
Dept: OTHER | Age: 58
Discharge: HOME | End: 2018-04-30
Attending: INTERNAL MEDICINE | Admitting: INTERNAL MEDICINE

## 2018-04-17 ENCOUNTER — ANTI-COAG VISIT (OUTPATIENT)
Age: 58
End: 2018-04-17

## 2018-04-17 DIAGNOSIS — D68.2 FACTOR V DEFICIENCY (HCC): ICD-10-CM

## 2018-04-17 LAB
INR BLD: 1.9
POC INR: 1.9 INDEX
PROTHROMBIN TIME, POC: 22.7 SECONDS (ref 10–14.3)
PROTIME: 22.7 SECONDS

## 2018-04-30 ENCOUNTER — HOSPITAL ENCOUNTER (OUTPATIENT)
Dept: LAB | Age: 58
Discharge: HOME | End: 2018-04-30

## 2018-04-30 LAB
ALBUMIN SERPL-MCNC: 4.4 GM/DL (ref 3.4–5)
ALP BLD-CCNC: 72 IU/L (ref 40–129)
ALT SERPL-CCNC: 20 U/L (ref 10–40)
ANION GAP SERPL CALCULATED.3IONS-SCNC: 14 MMOL/L (ref 4–16)
AST SERPL-CCNC: 19 IU/L (ref 15–37)
BASOPHILS ABSOLUTE: 0 K/CU MM
BASOPHILS RELATIVE PERCENT: 0.5 % (ref 0–1)
BILIRUB SERPL-MCNC: 0.3 MG/DL (ref 0–1)
BUN BLDV-MCNC: 25 MG/DL (ref 6–23)
CALCIUM SERPL-MCNC: 9.9 MG/DL (ref 8.3–10.6)
CHLORIDE BLD-SCNC: 96 MMOL/L (ref 99–110)
CO2: 29 MMOL/L (ref 21–32)
CREAT SERPL-MCNC: 1.1 MG/DL (ref 0.9–1.3)
DIFFERENTIAL TYPE: ABNORMAL
EOSINOPHILS ABSOLUTE: 0.1 K/CU MM
EOSINOPHILS RELATIVE PERCENT: 1.4 % (ref 0–3)
GFR AFRICAN AMERICAN: >60 ML/MIN/1.73M2
GFR NON-AFRICAN AMERICAN: >60 ML/MIN/1.73M2
GLUCOSE BLD-MCNC: 102 MG/DL (ref 70–99)
HCT VFR BLD CALC: 44.8 % (ref 42–52)
HEMOGLOBIN: 14.8 GM/DL (ref 13.5–18)
IMMATURE NEUTROPHIL %: 0.2 % (ref 0–0.43)
LYMPHOCYTES ABSOLUTE: 2.3 K/CU MM
LYMPHOCYTES RELATIVE PERCENT: 26.5 % (ref 24–44)
MCH RBC QN AUTO: 30.2 PG (ref 27–31)
MCHC RBC AUTO-ENTMCNC: 33 % (ref 32–36)
MCV RBC AUTO: 91.4 FL (ref 78–100)
MONOCYTES ABSOLUTE: 0.9 K/CU MM
MONOCYTES RELATIVE PERCENT: 10.3 % (ref 0–4)
PDW BLD-RTO: 12.7 % (ref 11.7–14.9)
PLATELET # BLD: 271 K/CU MM (ref 140–440)
PMV BLD AUTO: 9.1 FL (ref 7.5–11.1)
POTASSIUM SERPL-SCNC: 4.7 MMOL/L (ref 3.5–5.1)
RBC # BLD: 4.9 M/CU MM (ref 4.6–6.2)
SEGMENTED NEUTROPHILS ABSOLUTE COUNT: 5.3 K/CU MM
SEGMENTED NEUTROPHILS RELATIVE PERCENT: 61.1 % (ref 36–66)
SODIUM BLD-SCNC: 139 MMOL/L (ref 135–145)
TOTAL IMMATURE NEUTOROPHIL: 0.02 K/CU MM
TOTAL PROTEIN: 7.4 GM/DL (ref 6.4–8.2)
WBC # BLD: 8.6 K/CU MM (ref 4–10.5)

## 2018-05-01 ENCOUNTER — HOSPITAL ENCOUNTER (OUTPATIENT)
Dept: OTHER | Age: 58
Discharge: HOME | End: 2018-05-31
Attending: INTERNAL MEDICINE | Admitting: INTERNAL MEDICINE

## 2018-05-15 ENCOUNTER — ANTI-COAG VISIT (OUTPATIENT)
Age: 58
End: 2018-05-15

## 2018-05-15 DIAGNOSIS — D68.2 FACTOR V DEFICIENCY (HCC): ICD-10-CM

## 2018-05-15 LAB
INR BLD: 1.9
POC INR: 1.9 INDEX
PROTHROMBIN TIME, POC: 23.1 SECONDS (ref 10–14.3)
PROTIME: 23.1 SECONDS

## 2018-06-01 ENCOUNTER — HOSPITAL ENCOUNTER (OUTPATIENT)
Dept: OTHER | Age: 58
Discharge: HOME | End: 2018-06-30
Attending: INTERNAL MEDICINE | Admitting: INTERNAL MEDICINE

## 2018-06-12 ENCOUNTER — ANTI-COAG VISIT (OUTPATIENT)
Age: 58
End: 2018-06-12

## 2018-06-12 DIAGNOSIS — D68.2 FACTOR V DEFICIENCY (HCC): ICD-10-CM

## 2018-06-12 LAB
INR BLD: 1.8
POC INR: 1.8 INDEX
PROTHROMBIN TIME, POC: 21.9 SECONDS (ref 10–14.3)
PROTIME: 21.9 SECONDS

## 2018-06-12 RX ORDER — CYCLOBENZAPRINE HCL 10 MG
10 TABLET ORAL 2 TIMES DAILY PRN
COMMUNITY
End: 2020-03-10 | Stop reason: ALTCHOICE

## 2018-06-12 RX ORDER — TRIAMCINOLONE ACETONIDE 1 MG/G
CREAM TOPICAL 2 TIMES DAILY PRN
COMMUNITY

## 2018-06-12 RX ORDER — WARFARIN SODIUM 5 MG/1
TABLET ORAL
Qty: 47 TABLET | Refills: 0 | Status: SHIPPED | OUTPATIENT
Start: 2018-06-12 | End: 2018-07-13 | Stop reason: DRUGHIGH

## 2018-06-26 RX ORDER — WARFARIN SODIUM 5 MG/1
TABLET ORAL
Qty: 47 TABLET | Refills: 0 | Status: SHIPPED | OUTPATIENT
Start: 2018-06-26 | End: 2018-07-13 | Stop reason: DRUGHIGH

## 2018-07-01 ENCOUNTER — HOSPITAL ENCOUNTER (OUTPATIENT)
Dept: OTHER | Age: 58
Discharge: HOME | End: 2018-07-31
Attending: INTERNAL MEDICINE | Admitting: INTERNAL MEDICINE

## 2018-07-10 ENCOUNTER — TELEPHONE (OUTPATIENT)
Age: 58
End: 2018-07-10

## 2018-07-13 ENCOUNTER — ANTI-COAG VISIT (OUTPATIENT)
Age: 58
End: 2018-07-13

## 2018-07-13 DIAGNOSIS — D68.2 FACTOR V DEFICIENCY (HCC): ICD-10-CM

## 2018-07-13 LAB
INR BLD: 3.9
POC INR: 3.9 INDEX
PROTHROMBIN TIME, POC: 46.6 SECONDS (ref 10–14.3)
PROTIME: 46.6 SECONDS

## 2018-07-13 RX ORDER — WARFARIN SODIUM 5 MG/1
7.5 TABLET ORAL DAILY
COMMUNITY
End: 2018-10-30 | Stop reason: DRUGHIGH

## 2018-08-01 ENCOUNTER — HOSPITAL ENCOUNTER (OUTPATIENT)
Dept: OTHER | Age: 58
Discharge: HOME | End: 2018-08-31
Attending: INTERNAL MEDICINE | Admitting: INTERNAL MEDICINE

## 2018-08-07 ENCOUNTER — ANTI-COAG VISIT (OUTPATIENT)
Age: 58
End: 2018-08-07

## 2018-08-07 DIAGNOSIS — D68.2 FACTOR V DEFICIENCY (HCC): ICD-10-CM

## 2018-08-07 LAB
INR BLD: 2.3
POC INR: 2.3 INDEX
PROTHROMBIN TIME, POC: 28 SECONDS (ref 10–14.3)
PROTIME: 28 SECONDS

## 2018-09-01 ENCOUNTER — HOSPITAL ENCOUNTER (OUTPATIENT)
Dept: OTHER | Age: 58
Discharge: HOME | End: 2018-09-01
Attending: INTERNAL MEDICINE | Admitting: INTERNAL MEDICINE

## 2018-09-04 ENCOUNTER — ANTI-COAG VISIT (OUTPATIENT)
Age: 58
End: 2018-09-04

## 2018-09-04 DIAGNOSIS — D68.2 FACTOR V DEFICIENCY (HCC): ICD-10-CM

## 2018-09-04 LAB
INR BLD: 3
POC INR: 3 INDEX
PROTHROMBIN TIME, POC: 36.5 SECONDS (ref 10–14.3)
PROTIME: 36.5 SECONDS

## 2018-10-02 ENCOUNTER — ANTI-COAG VISIT (OUTPATIENT)
Dept: PHARMACY | Age: 58
End: 2018-10-02
Payer: COMMERCIAL

## 2018-10-02 DIAGNOSIS — D68.2 FACTOR V DEFICIENCY (HCC): ICD-10-CM

## 2018-10-02 LAB
INR BLD: 2.5
POC INR: 2.5 INDEX
PROTHROMBIN TIME, POC: 29.9 SECONDS (ref 10–14.3)
PROTIME: 29.9 SECONDS

## 2018-10-02 PROCEDURE — 85610 PROTHROMBIN TIME: CPT

## 2018-10-02 PROCEDURE — 36416 COLLJ CAPILLARY BLOOD SPEC: CPT

## 2018-10-02 PROCEDURE — 99211 OFF/OP EST MAY X REQ PHY/QHP: CPT

## 2018-10-30 ENCOUNTER — ANTI-COAG VISIT (OUTPATIENT)
Dept: PHARMACY | Age: 58
End: 2018-10-30
Payer: COMMERCIAL

## 2018-10-30 DIAGNOSIS — D68.2 FACTOR V DEFICIENCY (HCC): ICD-10-CM

## 2018-10-30 LAB
INTERNATIONAL NORMALIZATION RATIO, POC: 1.8
POC INR: 1.8 INDEX
PROTHROMBIN TIME, POC: 21.2 SECONDS (ref 10–14.3)

## 2018-10-30 PROCEDURE — 36416 COLLJ CAPILLARY BLOOD SPEC: CPT

## 2018-10-30 PROCEDURE — 99212 OFFICE O/P EST SF 10 MIN: CPT

## 2018-10-30 PROCEDURE — 85610 PROTHROMBIN TIME: CPT

## 2018-10-30 RX ORDER — WARFARIN SODIUM 7.5 MG/1
7.5 TABLET ORAL DAILY
Qty: 90 TABLET | Refills: 1 | Status: SHIPPED | OUTPATIENT
Start: 2018-10-30 | End: 2021-11-01 | Stop reason: SDUPTHER

## 2018-10-30 NOTE — PROGRESS NOTES
Medication Management Service  [facility]  [phone]    Visit Date: 10/30/2018   Subjective:       Farzad Cullen is a 63 y.o. male who presents to clinic today for anticoagulation monitoring and adjustment. Patient seen in clinic for warfarin management due to  Indication:   factor V Leiden mutation. INR goal: of 2.0-3.0. Duration of therapy: indefinite. Patient reports the following:   Adherent with regimen  Missed or extra doses:  States possibly missed 1 dose  Bleeding or thromboembolic side effects:  None  Significant medication changes:  None  Significant dietary changes: increased coleslaw this week (temporary)  Significant alcohol or tobacco changes: None  Significant recent illness, disease state changes, or hospitalization:  None  Upcoming surgeries or procedures:  None  Falls: None           Assessment and Plan     PT/INR done in office per protocol. INR today is 1.8, subtherapeutic. Patient states he may have missed a dose and he had increased coleslaw this week. Patient requested refills with [facility] mail order. Plan:  Take warfarin 10mg today then will continue current regimen of warfarin 7.5mg daily. ERx sent to [facility]. Recheck INR in 4 week(s). Patient verbalized understanding of dosing directions and information discussed. Dosing schedule given to patient including phone number, appointment date, and time. Progress note sent to referring office. Patient acknowledges working in consult agreement with pharmacist as referred by his/her physician.       Electronically signed by Lisa Atkinson [facility] on 10/30/18 at 2:26 PM

## 2018-11-27 ENCOUNTER — ANTI-COAG VISIT (OUTPATIENT)
Dept: PHARMACY | Age: 58
End: 2018-11-27
Payer: COMMERCIAL

## 2018-11-27 DIAGNOSIS — D68.2 FACTOR V DEFICIENCY (HCC): ICD-10-CM

## 2018-11-27 LAB
INR BLD: 3.3
POC INR: 3.3 INDEX
PROTHROMBIN TIME, POC: 39 SECONDS (ref 10–14.3)
PROTIME: 39 SECONDS

## 2018-11-27 PROCEDURE — 99212 OFFICE O/P EST SF 10 MIN: CPT

## 2018-11-27 PROCEDURE — 36416 COLLJ CAPILLARY BLOOD SPEC: CPT

## 2018-11-27 PROCEDURE — 85610 PROTHROMBIN TIME: CPT

## 2018-11-27 NOTE — PROGRESS NOTES
Medication Management Service  [facility]  [phone]    Visit Date: 11/27/2018   Subjective:       Bisi Linares is a 63 y.o. male who presents to clinic today for anticoagulation monitoring and adjustment. Patient seen in clinic for warfarin management due to  Indication:   factor V Leiden mutation. INR goal: of 2.0-3.0. Duration of therapy: indefinite. Patient reports the following:   Adherent with regimen  Missed or extra doses:  None   Bleeding or thromboembolic side effects:  None  Significant medication changes:  None  Significant dietary changes: None  Significant alcohol or tobacco changes: Increased beer consumption due to the holiday. Significant recent illness, disease state changes, or hospitalization:  None  Upcoming surgeries or procedures:  None  Falls: None           Assessment and Plan     PT/INR done in office per protocol. INR today is 3.3, supratherapeutic, likely due to increased alcohol consumption. Plan:  Take warfarin 3.75mg x 1 then will continue current regimen of warfarin 7.5mg daily. Recheck INR in 3 week(s). Patient verbalized understanding of dosing directions and information discussed. Dosing schedule given to patient including phone number, appointment date, and time. Progress note sent to referring office. Patient acknowledges working in consult agreement with pharmacist as referred by his/her physician.       Electronically signed by Jeremy [facility] on 11/27/18 at 4:53 PM

## 2018-11-30 DIAGNOSIS — D68.2 FACTOR V DEFICIENCY (HCC): Primary | ICD-10-CM

## 2018-11-30 DIAGNOSIS — Z79.01 CHRONIC ANTICOAGULATION: ICD-10-CM

## 2018-12-21 ENCOUNTER — ANTI-COAG VISIT (OUTPATIENT)
Dept: PHARMACY | Age: 58
End: 2018-12-21
Payer: COMMERCIAL

## 2018-12-21 DIAGNOSIS — D68.2 FACTOR V DEFICIENCY (HCC): ICD-10-CM

## 2018-12-21 LAB
INR BLD: 2.3
POC INR: 2.3 INDEX
PROTHROMBIN TIME, POC: 27.1 SECONDS (ref 10–14.3)
PROTIME: 27.1 SECONDS

## 2018-12-21 PROCEDURE — 99211 OFF/OP EST MAY X REQ PHY/QHP: CPT

## 2018-12-21 PROCEDURE — 85610 PROTHROMBIN TIME: CPT

## 2018-12-21 PROCEDURE — 36416 COLLJ CAPILLARY BLOOD SPEC: CPT

## 2019-01-07 ENCOUNTER — HOSPITAL ENCOUNTER (OUTPATIENT)
Age: 59
Discharge: HOME | End: 2019-01-07
Payer: COMMERCIAL

## 2019-01-07 LAB
ALBUMIN SERPL-MCNC: 4.4 GM/DL (ref 3.4–5)
ALP BLD-CCNC: 65 IU/L (ref 40–129)
ALT SERPL-CCNC: 13 U/L (ref 10–40)
ANION GAP SERPL CALCULATED.3IONS-SCNC: 11 MMOL/L (ref 4–16)
AST SERPL-CCNC: 17 IU/L (ref 15–37)
BANDED NEUTROPHILS ABSOLUTE COUNT: 0.09 K/CU MM
BANDED NEUTROPHILS RELATIVE PERCENT: 1 % (ref 5–11)
BILIRUB SERPL-MCNC: 0.7 MG/DL (ref 0–1)
BUN BLDV-MCNC: 19 MG/DL (ref 6–23)
CALCIUM SERPL-MCNC: 9 MG/DL (ref 8.3–10.6)
CHLORIDE BLD-SCNC: 96 MMOL/L (ref 99–110)
CHOLESTEROL, FASTING: 158 MG/DL
CO2: 32 MMOL/L (ref 21–32)
CREAT SERPL-MCNC: 1 MG/DL (ref 0.9–1.3)
DIFFERENTIAL TYPE: ABNORMAL
GFR AFRICAN AMERICAN: >60 ML/MIN/1.73M2
GFR NON-AFRICAN AMERICAN: >60 ML/MIN/1.73M2
GLUCOSE FASTING: 105 MG/DL (ref 70–99)
HCT VFR BLD CALC: 49.7 % (ref 42–52)
HDLC SERPL-MCNC: 49 MG/DL
HEMOGLOBIN: 16.4 GM/DL (ref 13.5–18)
LDL CHOLESTEROL DIRECT: 99 MG/DL
LYMPHOCYTES ABSOLUTE: 2 K/CU MM
LYMPHOCYTES RELATIVE PERCENT: 23 % (ref 24–44)
MCH RBC QN AUTO: 30.3 PG (ref 27–31)
MCHC RBC AUTO-ENTMCNC: 33 % (ref 32–36)
MCV RBC AUTO: 91.9 FL (ref 78–100)
MONOCYTES ABSOLUTE: 1.1 K/CU MM
MONOCYTES RELATIVE PERCENT: 12 % (ref 0–4)
PDW BLD-RTO: 13.6 % (ref 11.7–14.9)
PLATELET # BLD: 221 K/CU MM (ref 140–440)
PMV BLD AUTO: 9.6 FL (ref 7.5–11.1)
POTASSIUM SERPL-SCNC: 4.8 MMOL/L (ref 3.5–5.1)
PROSTATE SPECIFIC ANTIGEN: 0.59 NG/ML (ref 0–4)
RBC # BLD: 5.41 M/CU MM (ref 4.6–6.2)
SEGMENTED NEUTROPHILS ABSOLUTE COUNT: 5.7 K/CU MM
SEGMENTED NEUTROPHILS RELATIVE PERCENT: 64 % (ref 36–66)
SODIUM BLD-SCNC: 139 MMOL/L (ref 135–145)
TOTAL PROTEIN: 7.9 GM/DL (ref 6.4–8.2)
TRIGLYCERIDE, FASTING: 94 MG/DL
WBC # BLD: 8.9 K/CU MM (ref 4–10.5)

## 2019-01-07 PROCEDURE — G0103 PSA SCREENING: HCPCS

## 2019-01-07 PROCEDURE — 85025 COMPLETE CBC W/AUTO DIFF WBC: CPT

## 2019-01-07 PROCEDURE — 80053 COMPREHEN METABOLIC PANEL: CPT

## 2019-01-07 PROCEDURE — 80061 LIPID PANEL: CPT

## 2019-01-07 PROCEDURE — 36415 COLL VENOUS BLD VENIPUNCTURE: CPT

## 2019-01-29 ENCOUNTER — ANTI-COAG VISIT (OUTPATIENT)
Dept: PHARMACY | Age: 59
End: 2019-01-29
Payer: COMMERCIAL

## 2019-01-29 DIAGNOSIS — D68.2 FACTOR V DEFICIENCY (HCC): ICD-10-CM

## 2019-01-29 LAB
INR BLD: 2.3
POC INR: 2.3 INDEX
PROTHROMBIN TIME, POC: 27 SECONDS (ref 10–14.3)
PROTIME: 27 SECONDS

## 2019-01-29 PROCEDURE — 36416 COLLJ CAPILLARY BLOOD SPEC: CPT

## 2019-01-29 PROCEDURE — 85610 PROTHROMBIN TIME: CPT

## 2019-01-29 PROCEDURE — 99211 OFF/OP EST MAY X REQ PHY/QHP: CPT

## 2019-02-26 ENCOUNTER — ANTI-COAG VISIT (OUTPATIENT)
Dept: PHARMACY | Age: 59
End: 2019-02-26
Payer: COMMERCIAL

## 2019-02-26 DIAGNOSIS — D68.2 FACTOR V DEFICIENCY (HCC): ICD-10-CM

## 2019-02-26 LAB
INR BLD: 3.1
POC INR: 3.1 INDEX
PROTHROMBIN TIME, POC: 37.6 SECONDS (ref 10–14.3)
PROTIME: 37.6 SECONDS

## 2019-02-26 PROCEDURE — 36416 COLLJ CAPILLARY BLOOD SPEC: CPT

## 2019-02-26 PROCEDURE — 85610 PROTHROMBIN TIME: CPT

## 2019-02-26 PROCEDURE — 99211 OFF/OP EST MAY X REQ PHY/QHP: CPT

## 2019-03-08 ENCOUNTER — TELEPHONE (OUTPATIENT)
Dept: PHARMACY | Age: 59
End: 2019-03-08

## 2019-03-26 ENCOUNTER — ANTI-COAG VISIT (OUTPATIENT)
Dept: PHARMACY | Age: 59
End: 2019-03-26
Payer: COMMERCIAL

## 2019-03-26 DIAGNOSIS — D68.2 FACTOR V DEFICIENCY (HCC): ICD-10-CM

## 2019-03-26 LAB
INR BLD: 1.7
POC INR: 1.7 INDEX
POC INR: ABNORMAL INDEX
PROTHROMBIN TIME, POC: 20.1 SECONDS (ref 10–14.3)
PROTIME: 20.1 SECONDS

## 2019-03-26 PROCEDURE — 99212 OFFICE O/P EST SF 10 MIN: CPT

## 2019-03-26 PROCEDURE — 36416 COLLJ CAPILLARY BLOOD SPEC: CPT

## 2019-03-26 PROCEDURE — 85610 PROTHROMBIN TIME: CPT

## 2019-04-23 ENCOUNTER — ANTI-COAG VISIT (OUTPATIENT)
Dept: PHARMACY | Age: 59
End: 2019-04-23
Payer: COMMERCIAL

## 2019-04-23 DIAGNOSIS — D68.2 FACTOR V DEFICIENCY (HCC): ICD-10-CM

## 2019-04-23 LAB
INR BLD: 2.5
POC INR: 2.5 INDEX
POC INR: ABNORMAL INDEX
PROTHROMBIN TIME, POC: 30 SECONDS (ref 10–14.3)
PROTIME: 30 SECONDS

## 2019-04-23 PROCEDURE — 99211 OFF/OP EST MAY X REQ PHY/QHP: CPT

## 2019-04-23 PROCEDURE — 85610 PROTHROMBIN TIME: CPT

## 2019-04-23 PROCEDURE — 36416 COLLJ CAPILLARY BLOOD SPEC: CPT

## 2019-04-23 NOTE — PROGRESS NOTES
Medication Management Service  [facility]  [phone]    Visit Date: 4/23/2019   Subjective:       Arun Leigh is a 63 y.o. male who presents to clinic today for anticoagulation monitoring and adjustment. Patient seen in clinic for warfarin management due to  Indication:   factor V Leiden mutation. INR goal: of 2.0-3.0. Duration of therapy: indefinite. Patient reports the following:   Adherent with regimen  Missed or extra doses:  None   Bleeding or thromboembolic side effects:  None  Significant medication changes:  None  Significant dietary changes: None  Significant alcohol or tobacco changes: None  Significant recent illness, disease state changes, or hospitalization:  None  Upcoming surgeries or procedures:  None  Falls: None           Assessment and Plan     PT/INR done in office per protocol. INR today is 2.5, therapeutic. Plan: Will continue current regimen of warfarin 7.5mg daily. Recheck INR in 4 week(s). Patient verbalized understanding of dosing directions and information discussed. Dosing schedule given to patient including phone number, appointment date, and time. Progress note sent to referring office. Patient acknowledges working in consult agreement with pharmacist as referred by his/her physician.       Electronically signed by Little Guerra, [address] [place] [address] on 4/23/19 at 4:50 PM

## 2019-05-21 ENCOUNTER — ANTI-COAG VISIT (OUTPATIENT)
Dept: PHARMACY | Age: 59
End: 2019-05-21
Payer: COMMERCIAL

## 2019-05-21 DIAGNOSIS — D68.2 FACTOR V DEFICIENCY (HCC): ICD-10-CM

## 2019-05-21 LAB
INR BLD: 2.5
POC INR: 2.5 INDEX
POC INR: ABNORMAL INDEX
PROTHROMBIN TIME, POC: 30.2 SECONDS (ref 10–14.3)
PROTIME: 30.2 SECONDS

## 2019-05-21 PROCEDURE — 85610 PROTHROMBIN TIME: CPT

## 2019-05-21 PROCEDURE — 99211 OFF/OP EST MAY X REQ PHY/QHP: CPT

## 2019-05-21 PROCEDURE — 36416 COLLJ CAPILLARY BLOOD SPEC: CPT

## 2019-05-21 NOTE — PROGRESS NOTES
Medication Management Service  [facility]  [phone]    Visit Date: 5/21/2019   Subjective:       Johnson Lala is a 63 y.o. male who presents to clinic today for anticoagulation monitoring and adjustment. Patient seen in clinic for warfarin management due to  Indication:   factor V Leiden mutation. INR goal: of 2.0-3.0. Duration of therapy: indefinite. Patient reports the following:   Adherent with regimen  Missed or extra doses:  None   Bleeding or thromboembolic side effects:  None  Significant medication changes:  None  Significant dietary changes: None  Significant alcohol or tobacco changes: None  Significant recent illness, disease state changes, or hospitalization:  None  Upcoming surgeries or procedures:  None  Falls: None           Assessment and Plan     PT/INR done in office per protocol. INR today is 2.5, therapeutic. Plan: Will continue current regimen of warfarin 7.5mg daily. Recheck INR in 4 week(s). Patient verbalized understanding of dosing directions and information discussed. Dosing schedule given to patient including phone number, appointment date, and time. Progress note sent to referring office. Patient acknowledges working in consult agreement with pharmacist as referred by his/her physician.       Electronically signed by Gen Espinoza [facility] on 5/21/19 at 4:51 PM

## 2019-06-18 ENCOUNTER — ANTI-COAG VISIT (OUTPATIENT)
Dept: PHARMACY | Age: 59
End: 2019-06-18
Payer: COMMERCIAL

## 2019-06-18 ENCOUNTER — HOSPITAL ENCOUNTER (OUTPATIENT)
Age: 59
Discharge: HOME | End: 2019-06-18
Payer: COMMERCIAL

## 2019-06-18 DIAGNOSIS — D68.2 FACTOR V DEFICIENCY (HCC): ICD-10-CM

## 2019-06-18 LAB
ALBUMIN SERPL-MCNC: 4.4 GM/DL (ref 3.4–5)
ALP BLD-CCNC: 72 IU/L (ref 40–129)
ALT SERPL-CCNC: 17 U/L (ref 10–40)
ANION GAP SERPL CALCULATED.3IONS-SCNC: 10 MMOL/L (ref 4–16)
AST SERPL-CCNC: 22 IU/L (ref 15–37)
BASOPHILS ABSOLUTE: 0 K/CU MM
BASOPHILS RELATIVE PERCENT: 0.4 % (ref 0–1)
BILIRUB SERPL-MCNC: 0.7 MG/DL (ref 0–1)
BUN BLDV-MCNC: 12 MG/DL (ref 6–23)
CALCIUM SERPL-MCNC: 9.9 MG/DL (ref 8.3–10.6)
CHLORIDE BLD-SCNC: 95 MMOL/L (ref 99–110)
CO2: 32 MMOL/L (ref 21–32)
CREAT SERPL-MCNC: 1.3 MG/DL (ref 0.9–1.3)
DIFFERENTIAL TYPE: ABNORMAL
EOSINOPHILS ABSOLUTE: 0.1 K/CU MM
EOSINOPHILS RELATIVE PERCENT: 1 % (ref 0–3)
GFR AFRICAN AMERICAN: >60 ML/MIN/1.73M2
GFR NON-AFRICAN AMERICAN: 57 ML/MIN/1.73M2
GLUCOSE BLD-MCNC: 107 MG/DL (ref 70–99)
HCT VFR BLD CALC: 48.2 % (ref 42–52)
HEMOGLOBIN: 16.3 GM/DL (ref 13.5–18)
IMMATURE NEUTROPHIL %: 0.5 % (ref 0–0.43)
INR BLD: 3
LYMPHOCYTES ABSOLUTE: 2.3 K/CU MM
LYMPHOCYTES RELATIVE PERCENT: 28 % (ref 24–44)
MCH RBC QN AUTO: 30.9 PG (ref 27–31)
MCHC RBC AUTO-ENTMCNC: 33.8 % (ref 32–36)
MCV RBC AUTO: 91.3 FL (ref 78–100)
MONOCYTES ABSOLUTE: 0.8 K/CU MM
MONOCYTES RELATIVE PERCENT: 10.2 % (ref 0–4)
PDW BLD-RTO: 12.5 % (ref 11.7–14.9)
PLATELET # BLD: 257 K/CU MM (ref 140–440)
PMV BLD AUTO: 9.6 FL (ref 7.5–11.1)
POC INR: 3 INDEX
POC INR: ABNORMAL INDEX
POTASSIUM SERPL-SCNC: 4.7 MMOL/L (ref 3.5–5.1)
PROTHROMBIN TIME, POC: 35.5 SECONDS (ref 10–14.3)
PROTIME: 35.5 SECONDS
RBC # BLD: 5.28 M/CU MM (ref 4.6–6.2)
SEGMENTED NEUTROPHILS ABSOLUTE COUNT: 4.9 K/CU MM
SEGMENTED NEUTROPHILS RELATIVE PERCENT: 59.9 % (ref 36–66)
SODIUM BLD-SCNC: 137 MMOL/L (ref 135–145)
TOTAL IMMATURE NEUTOROPHIL: 0.04 K/CU MM
TOTAL PROTEIN: 7.9 GM/DL (ref 6.4–8.2)
WBC # BLD: 8.1 K/CU MM (ref 4–10.5)

## 2019-06-18 PROCEDURE — 85610 PROTHROMBIN TIME: CPT

## 2019-06-18 PROCEDURE — 36416 COLLJ CAPILLARY BLOOD SPEC: CPT

## 2019-06-18 PROCEDURE — 36415 COLL VENOUS BLD VENIPUNCTURE: CPT

## 2019-06-18 PROCEDURE — 99211 OFF/OP EST MAY X REQ PHY/QHP: CPT

## 2019-06-18 PROCEDURE — 80053 COMPREHEN METABOLIC PANEL: CPT

## 2019-06-18 PROCEDURE — 85025 COMPLETE CBC W/AUTO DIFF WBC: CPT

## 2019-06-18 NOTE — PROGRESS NOTES
Medication Management Service  [facility]  [phone]    Visit Date: 6/18/2019   Subjective:       James Solis is a 63 y.o. male who presents to clinic today for anticoagulation monitoring and adjustment. Patient seen in clinic for warfarin management due to  Indication:   factor V Leiden mutation. INR goal: of 2.0-3.0. Duration of therapy: indefinite. Patient reports the following:   Adherent with regimen  Missed or extra doses:  None   Bleeding or thromboembolic side effects:  None  Significant medication changes:  None  Significant dietary changes: Decreased overall intake. Significant alcohol or tobacco changes: None  Significant recent illness, disease state changes, or hospitalization:  None  Upcoming surgeries or procedures:  None  Falls: None           Assessment and Plan     PT/INR done in office per protocol. INR today is 3, therapeutic. Plan: Will continue current regimen of warfarin 7.5mg daily. Recheck INR in 4 week(s). Patient verbalized understanding of dosing directions and information discussed. Dosing schedule given to patient including phone number, appointment date, and time. Progress note sent to referring office. Patient acknowledges working in consult agreement with pharmacist as referred by his/her physician.       Electronically signed by GALDINO Schmidt [facility] on 6/18/19 at 4:57 PM

## 2019-07-16 ENCOUNTER — ANTI-COAG VISIT (OUTPATIENT)
Dept: PHARMACY | Age: 59
End: 2019-07-16
Payer: COMMERCIAL

## 2019-07-16 DIAGNOSIS — D68.2 FACTOR V DEFICIENCY (HCC): ICD-10-CM

## 2019-07-16 LAB
INR BLD: 3.3
POC INR: 3.3 INDEX
POC INR: ABNORMAL INDEX
PROTHROMBIN TIME, POC: 39.6
PROTHROMBIN TIME, POC: 39.6 SECONDS (ref 10–14.3)

## 2019-07-16 PROCEDURE — 36416 COLLJ CAPILLARY BLOOD SPEC: CPT

## 2019-07-16 PROCEDURE — 85610 PROTHROMBIN TIME: CPT

## 2019-07-16 PROCEDURE — 99212 OFFICE O/P EST SF 10 MIN: CPT

## 2019-08-13 ENCOUNTER — ANTI-COAG VISIT (OUTPATIENT)
Dept: PHARMACY | Age: 59
End: 2019-08-13
Payer: COMMERCIAL

## 2019-08-13 DIAGNOSIS — D68.2 FACTOR V DEFICIENCY (HCC): ICD-10-CM

## 2019-08-13 LAB
INR BLD: 3
POC INR: 3 INDEX
POC INR: ABNORMAL INDEX
PROTHROMBIN TIME, POC: 35.5
PROTHROMBIN TIME, POC: 35.5 SECONDS (ref 10–14.3)

## 2019-08-13 PROCEDURE — 85610 PROTHROMBIN TIME: CPT

## 2019-08-13 PROCEDURE — 36416 COLLJ CAPILLARY BLOOD SPEC: CPT

## 2019-08-13 PROCEDURE — 99211 OFF/OP EST MAY X REQ PHY/QHP: CPT

## 2019-08-13 NOTE — PROGRESS NOTES
Medication Management Service  [facility]  [phone]    Visit Date: 8/13/2019   Subjective:       Du Ferrell is a 63 y.o. male who presents to clinic today for anticoagulation monitoring and adjustment. Patient seen in clinic for warfarin management due to  Indication:   factor V Leiden mutation. INR goal: of 2.0-3.0. Duration of therapy: indefinite. Patient reports the following:   Adherent with regimen  Missed or extra doses:  None   Bleeding or thromboembolic side effects:  None  Significant medication changes:  None  Significant dietary changes: None  Significant alcohol or tobacco changes: None  Significant recent illness, disease state changes, or hospitalization:  None  Upcoming surgeries or procedures:  None  Falls: None           Assessment and Plan     PT/INR done in office per protocol. INR today is 3, therapeutic. Plan: Will continue current regimen of warfarin 7.5mg daily. Recheck INR in 4 week(s). Patient verbalized understanding of dosing directions and information discussed. Dosing schedule given to patient including phone number, appointment date, and time. Progress note sent to referring office. Patient acknowledges working in consult agreement with pharmacist as referred by his/her physician.       Electronically signed by Forest Gomez [facility] on 8/13/19 at 4:51 PM

## 2019-09-10 ENCOUNTER — ANTI-COAG VISIT (OUTPATIENT)
Dept: PHARMACY | Age: 59
End: 2019-09-10
Payer: COMMERCIAL

## 2019-09-10 DIAGNOSIS — D68.2 FACTOR V DEFICIENCY (HCC): ICD-10-CM

## 2019-09-10 LAB
INR BLD: 2.6
POC INR: 2.6 INDEX
POC INR: ABNORMAL INDEX
PROTHROMBIN TIME, POC: 31.4
PROTHROMBIN TIME, POC: 31.4 SECONDS (ref 10–14.3)

## 2019-09-10 PROCEDURE — 85610 PROTHROMBIN TIME: CPT

## 2019-09-10 PROCEDURE — 36416 COLLJ CAPILLARY BLOOD SPEC: CPT

## 2019-09-10 PROCEDURE — 99212 OFFICE O/P EST SF 10 MIN: CPT

## 2019-10-08 ENCOUNTER — ANTI-COAG VISIT (OUTPATIENT)
Dept: PHARMACY | Age: 59
End: 2019-10-08
Payer: COMMERCIAL

## 2019-10-08 DIAGNOSIS — D68.2 FACTOR V DEFICIENCY (HCC): ICD-10-CM

## 2019-10-08 LAB
INR BLD: 3
POC INR: 3 INDEX
POC INR: ABNORMAL INDEX
PROTHROMBIN TIME, POC: 36
PROTHROMBIN TIME, POC: 36 SECONDS (ref 10–14.3)

## 2019-10-08 PROCEDURE — 85610 PROTHROMBIN TIME: CPT

## 2019-10-08 PROCEDURE — 36416 COLLJ CAPILLARY BLOOD SPEC: CPT

## 2019-10-08 PROCEDURE — 99211 OFF/OP EST MAY X REQ PHY/QHP: CPT

## 2019-10-18 ENCOUNTER — TELEPHONE (OUTPATIENT)
Dept: PHARMACY | Age: 59
End: 2019-10-18

## 2019-10-23 ENCOUNTER — HOSPITAL ENCOUNTER (OUTPATIENT)
Age: 59
Discharge: HOME | End: 2019-10-23
Payer: COMMERCIAL

## 2019-10-23 LAB
INR BLD: 2.07 INDEX
PROTHROMBIN TIME: 23.9 SECONDS (ref 11.7–14.5)

## 2019-10-23 PROCEDURE — 85610 PROTHROMBIN TIME: CPT

## 2019-10-23 PROCEDURE — 36415 COLL VENOUS BLD VENIPUNCTURE: CPT

## 2019-11-05 ENCOUNTER — ANTI-COAG VISIT (OUTPATIENT)
Dept: PHARMACY | Age: 59
End: 2019-11-05
Payer: COMMERCIAL

## 2019-11-05 DIAGNOSIS — D68.2 FACTOR V DEFICIENCY (HCC): ICD-10-CM

## 2019-11-05 LAB
INR BLD: 2.2
POC INR: 2.2 INDEX
POC INR: ABNORMAL INDEX
PROTHROMBIN TIME, POC: 26.9 SECONDS (ref 10–14.3)
PROTHROMBIN TIME: 26.9

## 2019-11-05 PROCEDURE — 85610 PROTHROMBIN TIME: CPT

## 2019-11-05 PROCEDURE — 99211 OFF/OP EST MAY X REQ PHY/QHP: CPT

## 2019-11-05 PROCEDURE — 36416 COLLJ CAPILLARY BLOOD SPEC: CPT

## 2019-12-03 ENCOUNTER — ANTI-COAG VISIT (OUTPATIENT)
Dept: PHARMACY | Age: 59
End: 2019-12-03
Payer: COMMERCIAL

## 2019-12-03 DIAGNOSIS — D68.2 FACTOR V DEFICIENCY (HCC): ICD-10-CM

## 2019-12-03 LAB
INR BLD: 4.4
POC INR: 4.4 INDEX
POC INR: ABNORMAL INDEX
PROTHROMBIN TIME, POC: 52.9 SECONDS (ref 10–14.3)
PROTIME: 52.9 SECONDS

## 2019-12-03 PROCEDURE — 36416 COLLJ CAPILLARY BLOOD SPEC: CPT

## 2019-12-03 PROCEDURE — 99212 OFFICE O/P EST SF 10 MIN: CPT

## 2019-12-03 PROCEDURE — 85610 PROTHROMBIN TIME: CPT

## 2019-12-11 ENCOUNTER — HOSPITAL ENCOUNTER (OUTPATIENT)
Age: 59
Discharge: HOME | End: 2019-12-11
Payer: COMMERCIAL

## 2019-12-11 ENCOUNTER — HOSPITAL ENCOUNTER (OUTPATIENT)
Dept: GENERAL RADIOLOGY | Age: 59
Discharge: HOME | End: 2019-12-11
Payer: COMMERCIAL

## 2019-12-11 DIAGNOSIS — M25.561 RIGHT KNEE PAIN, UNSPECIFIED CHRONICITY: ICD-10-CM

## 2019-12-11 PROCEDURE — 73560 X-RAY EXAM OF KNEE 1 OR 2: CPT

## 2019-12-17 ENCOUNTER — ANTI-COAG VISIT (OUTPATIENT)
Dept: PHARMACY | Age: 59
End: 2019-12-17
Payer: COMMERCIAL

## 2019-12-17 DIAGNOSIS — D68.2 FACTOR V DEFICIENCY (HCC): ICD-10-CM

## 2019-12-17 LAB
INR BLD: 2.1
POC INR: 2.1 INDEX
POC INR: ABNORMAL INDEX
PROTHROMBIN TIME, POC: 25 SECONDS (ref 10–14.3)
PROTIME: 25 SECONDS

## 2019-12-17 PROCEDURE — 99211 OFF/OP EST MAY X REQ PHY/QHP: CPT

## 2019-12-17 PROCEDURE — 85610 PROTHROMBIN TIME: CPT

## 2019-12-17 PROCEDURE — 36416 COLLJ CAPILLARY BLOOD SPEC: CPT

## 2020-01-14 ENCOUNTER — ANTI-COAG VISIT (OUTPATIENT)
Dept: PHARMACY | Age: 60
End: 2020-01-14
Payer: COMMERCIAL

## 2020-01-14 LAB
INR BLD: 2.2
POC INR: 2.2 INDEX
POC INR: ABNORMAL INDEX
PROTHROMBIN TIME, POC: 26.6 SECONDS (ref 10–14.3)
PROTIME: 26.6 SECONDS

## 2020-01-14 PROCEDURE — 36416 COLLJ CAPILLARY BLOOD SPEC: CPT

## 2020-01-14 PROCEDURE — 99211 OFF/OP EST MAY X REQ PHY/QHP: CPT

## 2020-01-14 PROCEDURE — 85610 PROTHROMBIN TIME: CPT

## 2020-01-14 NOTE — PROGRESS NOTES
Medication Management Service  [facility]  [phone]    Visit Date: 1/14/2020   Subjective:       Kiera Elizabeth is a 62 y.o. male who presents to clinic today for anticoagulation monitoring and adjustment. Patient seen in clinic for warfarin management due to  Indication:   factor V Leiden mutation. INR goal: of 2.0-3.0. Duration of therapy: indefinite. Patient reports the following:   Adherent with regimen  Missed or extra doses:  None   Bleeding or thromboembolic side effects:  None  Significant medication changes:  None  Significant dietary changes: None  Significant alcohol or tobacco changes: None  Significant recent illness, disease state changes, or hospitalization:  None  Upcoming surgeries or procedures:  None  Falls: None           Assessment and Plan     PT/INR done in office per protocol. INR today is 2.2, therapeutic. Plan: Will continue current regimen of warfarin 7.5mg daily. Recheck INR in 4 week(s). Patient verbalized understanding of dosing directions and information discussed. Dosing schedule given to patient including phone number, appointment date, and time. Progress note sent to referring office. Patient acknowledges working in consult agreement with pharmacist as referred by his/her physician.       Electronically signed by Marisol Garber, [address] [place] [address] on 1/14/20 at 3:50 PM

## 2020-01-28 ENCOUNTER — HOSPITAL ENCOUNTER (OUTPATIENT)
Age: 60
Discharge: HOME | End: 2020-01-28
Payer: COMMERCIAL

## 2020-01-28 LAB
ANION GAP SERPL CALCULATED.3IONS-SCNC: 10 MMOL/L (ref 4–16)
BUN BLDV-MCNC: 14 MG/DL (ref 6–23)
CALCIUM SERPL-MCNC: 9.8 MG/DL (ref 8.3–10.6)
CHLORIDE BLD-SCNC: 92 MMOL/L (ref 99–110)
CO2: 31 MMOL/L (ref 21–32)
CREAT SERPL-MCNC: 1.1 MG/DL (ref 0.9–1.3)
EKG ATRIAL RATE: 102 BPM
EKG DIAGNOSIS: NORMAL
EKG P AXIS: 63 DEGREES
EKG P-R INTERVAL: 154 MS
EKG Q-T INTERVAL: 328 MS
EKG QRS DURATION: 86 MS
EKG QTC CALCULATION (BAZETT): 427 MS
EKG R AXIS: 14 DEGREES
EKG T AXIS: 75 DEGREES
EKG VENTRICULAR RATE: 102 BPM
GFR AFRICAN AMERICAN: >60 ML/MIN/1.73M2
GFR NON-AFRICAN AMERICAN: >60 ML/MIN/1.73M2
GLUCOSE FASTING: 114 MG/DL (ref 70–99)
POTASSIUM SERPL-SCNC: 4.5 MMOL/L (ref 3.5–5.1)
SODIUM BLD-SCNC: 133 MMOL/L (ref 135–145)

## 2020-01-28 PROCEDURE — 80048 BASIC METABOLIC PNL TOTAL CA: CPT

## 2020-01-28 PROCEDURE — 93010 ELECTROCARDIOGRAM REPORT: CPT | Performed by: INTERNAL MEDICINE

## 2020-01-28 PROCEDURE — 36415 COLL VENOUS BLD VENIPUNCTURE: CPT

## 2020-01-28 PROCEDURE — 93005 ELECTROCARDIOGRAM TRACING: CPT | Performed by: PHYSICIAN ASSISTANT

## 2020-02-11 ENCOUNTER — ANTI-COAG VISIT (OUTPATIENT)
Dept: PHARMACY | Age: 60
End: 2020-02-11
Payer: COMMERCIAL

## 2020-02-11 LAB
INR BLD: 2.3
POC INR: 2.3 INDEX
POC INR: ABNORMAL INDEX
PROTHROMBIN TIME, POC: 27.2 SECONDS (ref 10–14.3)
PROTIME: 27.2 SECONDS

## 2020-02-11 PROCEDURE — 36416 COLLJ CAPILLARY BLOOD SPEC: CPT

## 2020-02-11 PROCEDURE — 99211 OFF/OP EST MAY X REQ PHY/QHP: CPT

## 2020-02-11 PROCEDURE — 85610 PROTHROMBIN TIME: CPT

## 2020-02-11 NOTE — PROGRESS NOTES
Medication Management Service  [facility]  [phone]    Visit Date: 2/11/2020   Subjective:       Niranjan James is a 62 y.o. male who presents to clinic today for anticoagulation monitoring and adjustment. Patient seen in clinic for warfarin management due to  Indication:   factor V Leiden mutation. INR goal: of 2.0-3.0. Duration of therapy: indefinite. Patient reports the following:   Adherent with regimen  Missed or extra doses:  None   Bleeding or thromboembolic side effects:  None  Significant medication changes:  None  Significant dietary changes: None  Significant alcohol or tobacco changes: None  Significant recent illness, disease state changes, or hospitalization:  None  Upcoming surgeries or procedures:  None  Falls: None           Assessment and Plan     PT/INR done in office per protocol. INR today is 2.3, therapeutic. Plan: Will continue current regimen of warfarin 7.5mg daily. Recheck INR in 4 week(s). Patient verbalized understanding of dosing directions and information discussed. Dosing schedule given to patient including phone number, appointment date, and time. Progress note sent to referring office. Patient acknowledges working in consult agreement with pharmacist as referred by his/her physician.       Electronically signed by Shauna Russell, [address] [place] [address] on 2/11/20 at 4:46 PM

## 2020-03-10 ENCOUNTER — ANTI-COAG VISIT (OUTPATIENT)
Dept: PHARMACY | Age: 60
End: 2020-03-10
Payer: COMMERCIAL

## 2020-03-10 LAB
INR BLD: 2
POC INR: 2 INDEX
POC INR: ABNORMAL INDEX
PROTHROMBIN TIME, POC: 23.7 SECONDS (ref 10–14.3)
PROTIME: 23.7 SECONDS

## 2020-03-10 PROCEDURE — 99211 OFF/OP EST MAY X REQ PHY/QHP: CPT

## 2020-03-10 PROCEDURE — 85610 PROTHROMBIN TIME: CPT

## 2020-03-10 PROCEDURE — 36416 COLLJ CAPILLARY BLOOD SPEC: CPT

## 2020-03-10 RX ORDER — GABAPENTIN 300 MG/1
300 CAPSULE ORAL 3 TIMES DAILY
COMMUNITY
End: 2021-11-01 | Stop reason: SDUPTHER

## 2020-03-10 NOTE — PROGRESS NOTES
Medication Management Service  [facility]  [phone]    Visit Date: 3/10/2020   Subjective:       Harpreet Urbano is a 62 y.o. male who presents to clinic today for anticoagulation monitoring and adjustment. Patient seen in clinic for warfarin management due to  Indication:   factor V Leiden mutation. INR goal: of 2.0-3.0. Duration of therapy: indefinite. Patient reports the following:   Adherent with regimen  Missed or extra doses:  Missed dose Car 3/8  Bleeding or thromboembolic side effects:  None  Significant medication changes:  None  Significant dietary changes: None  Significant alcohol or tobacco changes: None  Significant recent illness, disease state changes, or hospitalization:  None  Upcoming surgeries or procedures:  None  Falls: None           Assessment and Plan     PT/INR done in office per protocol. INR today is 2, therapeutic. Plan: Will continue current regimen of warfarin 7.5mg daily. Recheck INR in 4 week(s). Patient verbalized understanding of dosing directions and information discussed. Dosing schedule given to patient including phone number, appointment date, and time. Progress note sent to referring office. Patient acknowledges working in consult agreement with pharmacist as referred by his/her physician.       Electronically signed by Elizabeth Pa, [address] [place] [address] on 3/10/20 at 4:50 PM EDT    CLINICAL PHARMACY CONSULT: MED RECONCILIATION/REVIEW ADDENDUM    For Pharmacy Admin Tracking Only    PHSO: Yes  Total # of Interventions Recommended: 0  - Maintenance Safety Lab Monitoring #: 1  Total Interventions Accepted: 0  Time Spent (min): 15    Elizabeth Pa PharmD

## 2020-03-16 ENCOUNTER — TELEPHONE (OUTPATIENT)
Dept: PHARMACY | Age: 60
End: 2020-03-16

## 2020-03-16 NOTE — TELEPHONE ENCOUNTER
Left VM to reschedule patient to reduce possible exposure to COVID-19.     Yumiko Gordon, PharmD, [facility]  3/16/2020  9:11 AM

## 2020-03-17 NOTE — TELEPHONE ENCOUNTER
Patient called back yesterday and left  stating he could come any Tuesday at 4:45pm. Rescheduled for Tuesday 5/5/20. Called home and spoke to patients wife who states she will let him know about the new appointment.     Teddy AnnD, [facility]  3/17/2020  10:45 AM

## 2020-04-28 ENCOUNTER — TELEPHONE (OUTPATIENT)
Dept: PHARMACY | Age: 60
End: 2020-04-28

## 2020-05-01 ENCOUNTER — TELEPHONE (OUTPATIENT)
Dept: PHARMACY | Age: 60
End: 2020-05-01

## 2020-05-01 NOTE — TELEPHONE ENCOUNTER
Patient had a change in his work schedule and has to start working 12 hour days so he will not be able to have INR drawn until Tuesday 5/5.     CLINICAL PHARMACY CONSULT: MED RECONCILIATION/REVIEW ADDENDUM    For Pharmacy Admin Tracking Only    PHSO: Yes  Total # of Interventions Recommended: 0  Total Interventions Accepted: 0  Time Spent (min): 5    Albert Dunne, PharmD

## 2020-05-04 ENCOUNTER — TELEPHONE (OUTPATIENT)
Dept: PHARMACY | Age: 60
End: 2020-05-04

## 2020-05-04 NOTE — TELEPHONE ENCOUNTER
Left patient VM to call clinic and a reminder to complete lab tomorrow.      Lacy Darden, [place], [place]  5/4/2020  5:27 PM

## 2020-05-05 ENCOUNTER — ANTI-COAG VISIT (OUTPATIENT)
Dept: PHARMACY | Age: 60
End: 2020-05-05
Payer: COMMERCIAL

## 2020-05-05 LAB
INR BLD: 3.04 INDEX
PROTHROMBIN TIME: 35.4 SECONDS (ref 11.7–14.5)

## 2020-05-05 PROCEDURE — 85610 PROTHROMBIN TIME: CPT

## 2020-05-05 PROCEDURE — 36415 COLL VENOUS BLD VENIPUNCTURE: CPT

## 2020-05-06 PROCEDURE — 99211 OFF/OP EST MAY X REQ PHY/QHP: CPT

## 2020-06-24 ENCOUNTER — HOSPITAL ENCOUNTER (OUTPATIENT)
Age: 60
Setting detail: OBSERVATION
Discharge: HOME | End: 2020-06-25
Attending: SURGERY | Admitting: SURGERY
Payer: COMMERCIAL

## 2020-06-24 ENCOUNTER — HOSPITAL ENCOUNTER (OUTPATIENT)
Dept: CT IMAGING | Age: 60
Discharge: HOME | End: 2020-06-24
Payer: COMMERCIAL

## 2020-06-24 LAB
ANION GAP SERPL CALCULATED.3IONS-SCNC: 11 MMOL/L (ref 4–16)
APTT: 52.2 SECONDS (ref 25.1–37.1)
BUN BLDV-MCNC: 13 MG/DL (ref 6–23)
CALCIUM SERPL-MCNC: 9.6 MG/DL (ref 8.3–10.6)
CHLORIDE BLD-SCNC: 93 MMOL/L (ref 99–110)
CO2: 30 MMOL/L (ref 21–32)
CREAT SERPL-MCNC: 1.1 MG/DL (ref 0.9–1.3)
GFR AFRICAN AMERICAN: >60 ML/MIN/1.73M2
GFR NON-AFRICAN AMERICAN: >60 ML/MIN/1.73M2
GLUCOSE BLD-MCNC: 98 MG/DL (ref 70–99)
INR BLD: 2.21 INDEX
POTASSIUM SERPL-SCNC: 3.9 MMOL/L (ref 3.5–5.1)
PROTHROMBIN TIME: 26.9 SECONDS (ref 11.7–14.5)
SODIUM BLD-SCNC: 134 MMOL/L (ref 135–145)

## 2020-06-24 PROCEDURE — 2580000003 HC RX 258: Performed by: SURGERY

## 2020-06-24 PROCEDURE — 96376 TX/PRO/DX INJ SAME DRUG ADON: CPT

## 2020-06-24 PROCEDURE — 85610 PROTHROMBIN TIME: CPT

## 2020-06-24 PROCEDURE — 74177 CT ABD & PELVIS W/CONTRAST: CPT

## 2020-06-24 PROCEDURE — 2500000003 HC RX 250 WO HCPCS: Performed by: SURGERY

## 2020-06-24 PROCEDURE — 96372 THER/PROPH/DIAG INJ SC/IM: CPT

## 2020-06-24 PROCEDURE — 80048 BASIC METABOLIC PNL TOTAL CA: CPT

## 2020-06-24 PROCEDURE — 96367 TX/PROPH/DG ADDL SEQ IV INF: CPT

## 2020-06-24 PROCEDURE — 6360000002 HC RX W HCPCS: Performed by: SURGERY

## 2020-06-24 PROCEDURE — 96365 THER/PROPH/DIAG IV INF INIT: CPT

## 2020-06-24 PROCEDURE — 6360000004 HC RX CONTRAST MEDICATION: Performed by: INTERNAL MEDICINE

## 2020-06-24 PROCEDURE — 96375 TX/PRO/DX INJ NEW DRUG ADDON: CPT

## 2020-06-24 PROCEDURE — 85730 THROMBOPLASTIN TIME PARTIAL: CPT

## 2020-06-24 PROCEDURE — G0378 HOSPITAL OBSERVATION PER HR: HCPCS

## 2020-06-24 PROCEDURE — 99218 PR INITIAL OBSERVATION CARE/DAY 30 MINUTES: CPT | Performed by: SURGERY

## 2020-06-24 PROCEDURE — 1200000000 HC SEMI PRIVATE

## 2020-06-24 PROCEDURE — G0379 DIRECT REFER HOSPITAL OBSERV: HCPCS

## 2020-06-24 RX ORDER — DEXTROSE, SODIUM CHLORIDE, AND POTASSIUM CHLORIDE 5; .45; .15 G/100ML; G/100ML; G/100ML
INJECTION INTRAVENOUS CONTINUOUS
Status: DISCONTINUED | OUTPATIENT
Start: 2020-06-24 | End: 2020-06-25 | Stop reason: HOSPADM

## 2020-06-24 RX ORDER — SODIUM CHLORIDE 0.9 % (FLUSH) 0.9 %
10 SYRINGE (ML) INJECTION EVERY 12 HOURS SCHEDULED
Status: DISCONTINUED | OUTPATIENT
Start: 2020-06-24 | End: 2020-06-25 | Stop reason: HOSPADM

## 2020-06-24 RX ORDER — SODIUM CHLORIDE 0.9 % (FLUSH) 0.9 %
10 SYRINGE (ML) INJECTION PRN
Status: DISCONTINUED | OUTPATIENT
Start: 2020-06-24 | End: 2020-06-25 | Stop reason: HOSPADM

## 2020-06-24 RX ORDER — CIPROFLOXACIN 2 MG/ML
400 INJECTION, SOLUTION INTRAVENOUS EVERY 12 HOURS
Status: DISCONTINUED | OUTPATIENT
Start: 2020-06-24 | End: 2020-06-24

## 2020-06-24 RX ORDER — ONDANSETRON 2 MG/ML
4 INJECTION INTRAMUSCULAR; INTRAVENOUS EVERY 6 HOURS PRN
Status: DISCONTINUED | OUTPATIENT
Start: 2020-06-24 | End: 2020-06-25 | Stop reason: HOSPADM

## 2020-06-24 RX ORDER — POTASSIUM CHLORIDE 7.45 MG/ML
10 INJECTION INTRAVENOUS PRN
Status: DISCONTINUED | OUTPATIENT
Start: 2020-06-24 | End: 2020-06-25 | Stop reason: HOSPADM

## 2020-06-24 RX ADMIN — HYDROMORPHONE HYDROCHLORIDE 1 MG: 1 INJECTION, SOLUTION INTRAMUSCULAR; INTRAVENOUS; SUBCUTANEOUS at 23:25

## 2020-06-24 RX ADMIN — POTASSIUM CHLORIDE, DEXTROSE MONOHYDRATE AND SODIUM CHLORIDE: 150; 5; 450 INJECTION, SOLUTION INTRAVENOUS at 19:06

## 2020-06-24 RX ADMIN — ENOXAPARIN SODIUM 40 MG: 40 INJECTION SUBCUTANEOUS at 19:10

## 2020-06-24 RX ADMIN — IOPAMIDOL 100 ML: 755 INJECTION, SOLUTION INTRAVENOUS at 13:24

## 2020-06-24 RX ADMIN — IOHEXOL 50 ML: 240 INJECTION, SOLUTION INTRATHECAL; INTRAVASCULAR; INTRAVENOUS; ORAL at 13:24

## 2020-06-24 RX ADMIN — CIPROFLOXACIN 400 MG: 2 INJECTION, SOLUTION INTRAVENOUS at 19:08

## 2020-06-24 RX ADMIN — PIPERACILLIN AND TAZOBACTAM 3.38 G: 3; .375 INJECTION, POWDER, LYOPHILIZED, FOR SOLUTION INTRAVENOUS at 21:01

## 2020-06-24 RX ADMIN — HYDROMORPHONE HYDROCHLORIDE 1 MG: 1 INJECTION, SOLUTION INTRAMUSCULAR; INTRAVENOUS; SUBCUTANEOUS at 21:06

## 2020-06-24 ASSESSMENT — PAIN DESCRIPTION - ORIENTATION
ORIENTATION: RIGHT;LOWER
ORIENTATION: RIGHT
ORIENTATION: RIGHT

## 2020-06-24 ASSESSMENT — PAIN DESCRIPTION - ONSET
ONSET: ON-GOING

## 2020-06-24 ASSESSMENT — PAIN DESCRIPTION - DESCRIPTORS
DESCRIPTORS: CONSTANT;DISCOMFORT
DESCRIPTORS: ACHING;CONSTANT
DESCRIPTORS: CONSTANT;DISCOMFORT;ACHING

## 2020-06-24 ASSESSMENT — PAIN DESCRIPTION - FREQUENCY
FREQUENCY: CONTINUOUS
FREQUENCY: CONTINUOUS

## 2020-06-24 ASSESSMENT — PAIN DESCRIPTION - LOCATION
LOCATION: ABDOMEN

## 2020-06-24 ASSESSMENT — PAIN DESCRIPTION - PAIN TYPE
TYPE: ACUTE PAIN

## 2020-06-24 ASSESSMENT — PAIN - FUNCTIONAL ASSESSMENT
PAIN_FUNCTIONAL_ASSESSMENT: ACTIVITIES ARE NOT PREVENTED
PAIN_FUNCTIONAL_ASSESSMENT: ACTIVITIES ARE NOT PREVENTED

## 2020-06-24 ASSESSMENT — PAIN SCALES - GENERAL
PAINLEVEL_OUTOF10: 3
PAINLEVEL_OUTOF10: 4
PAINLEVEL_OUTOF10: 5
PAINLEVEL_OUTOF10: 2
PAINLEVEL_OUTOF10: 5
PAINLEVEL_OUTOF10: 7

## 2020-06-24 ASSESSMENT — PAIN DESCRIPTION - PROGRESSION
CLINICAL_PROGRESSION: GRADUALLY WORSENING
CLINICAL_PROGRESSION: NOT CHANGED

## 2020-06-24 NOTE — H&P
HISTORY AND PHYSICAL EXAM    Date of Admission:  6/24/2020    PCP:  Jeremias Lucia M.D. Chief Complaint / History of Present Illness:  Amy Xie is a very pleasant 62 y.o. male who presents with pain in the Right Lower Quadrant and is acute, worsening and dull compared to patient's normal condition. It is severe in intensity for a duration of 2 days and is intermittent with modifying factors increased by or worse with pressure. Pain worse today compared to yesterday. .. Continue NPO/Bowel rest, IV Fluids, Pain control, Nausea control, IV Antibiotics, and will check INR and proceed with surgery ASAP, safely around his anticoagulation. PMH:   has a past medical history of Factor 5 Leiden mutation, heterozygous ([address] Hypertension, Hypertriglyceridemia (12/17/2016), and Non morbid obesity due to excess calories (12/17/2016). PSH:   has a past surgical history that includes Leg Surgery (Right, 1990); shoulder surgery (Right, 2002); and Leg Surgery (Left, 2007). Allergies:  No Known Allergies     Home Meds:    Prior to Admission medications    Medication Sig Start Date End Date Taking? Authorizing Provider   gabapentin (NEURONTIN) 300 MG capsule Take 300 mg by mouth 3 times daily.    Yes Historical Provider, MD   warfarin (COUMADIN) 7.5 MG tablet Take 1 tablet by mouth daily 10/30/18  Yes Christi Sierra MD   triamcinolone (KENALOG) 0.1 % cream Apply topically 2 times daily as needed   Yes Historical Provider, MD   lisinopril-hydrochlorothiazide (PRINZIDE;ZESTORETIC) 20-25 MG per tablet Take 1 tablet by mouth 2 times daily 11/20/17  Yes Chen Orozco MD   B-Complex TABS Take 1 tablet by mouth daily   Yes Historical Provider, MD   traMADol (ULTRAM) 50 MG tablet Take 50 mg by mouth every 6 hours as needed for Pain    Historical Provider, MD        Hospital Meds:    Current Facility-Administered Medications   Medication Dose Route Frequency Provider Last Rate Last Dose    0.9 % sodium chloride bolus  20 mL Intravenous Once Brenden Manrique MD        sodium chloride flush 0.9 % injection 10 mL  10 mL Intravenous Q12H Brenden Manrique MD        sodium chloride flush 0.9 % injection 10 mL  10 mL Intravenous 2 times per day Brenden Manrique MD        sodium chloride flush 0.9 % injection 10 mL  10 mL Intravenous PRN Brenden Manrique MD        potassium chloride 10 mEq/100 mL IVPB (Peripheral Line)  10 mEq Intravenous PRN Brenden Manrique MD        HYDROmorphone (DILAUDID) injection 1 mg  1 mg Intravenous Q2H PRN Brenden Manrique MD   1 mg at 20 0451    ondansetron (ZOFRAN) injection 4 mg  4 mg Intravenous Q6H PRN Brenden Manrique MD        enoxaparin (LOVENOX) injection 40 mg  40 mg Subcutaneous Daily Brenden Manrique MD   40 mg at 20 1910    dextrose 5 % and 0.45 % NaCl with KCl 20 mEq infusion   Intravenous Continuous Brenden Manrique  mL/hr at 20 0543      piperacillin-tazobactam (ZOSYN) 3.375 g in dextrose 5 % 50 mL IVPB infusion  3.375 g Intravenous Q6H Brenden Manrique MD   Stopped at 20 0258      dextrose 5% and 0.45% NaCl with KCl 20 mEq 125 mL/hr at 20 0543       Social History / Family History:        Social History     Socioeconomic History    Marital status:      Spouse name: Not on file    Number of children: Not on file    Years of education: Not on file    Highest education level: Not on file   Occupational History    Not on file   Social Needs    Financial resource strain: Not on file    Food insecurity     Worry: Not on file     Inability: Not on file   [employer] needs     Medical: Not on file     Non-medical: Not on file   Tobacco Use    Smoking status: Former Smoker     Packs/day: 2.00     Years: 30.00     Pack years: 60.00     Types: Cigarettes     Last attempt to quit: 2006     Years since quittin.9    Smokeless tobacco: Never Used   Substance and Sexual Activity    Alcohol use:  Yes agitation. All others reviewed and negative. Physical Exam:  Vital Signs:   Vitals:    06/25/20 0333   BP: 117/67   Pulse: 82   Resp: 17   Temp: 97.6 °F (36.4 °C)   SpO2: 96%     Body mass index is 36.68 kg/m². General appearance: Pt Alert and oriented, to persons place and time, in no apparent acute distress. HEENT:  GAIL, EOMI, Conjunctivae clear. No JVDs, Bruits, No thyroid-megaly. Lungs: No dyspnea. Clear to auscultation bilaterally. Heart: Regular rate and rhythm, S1, S2 normal, no murmur, rub or gallop. No legs edema. Abdomen: Significantly tender RLQ positive Rovsing sign. Non distended. Positive bowel sounds. No hernias noted, no masses palpable. Extremities: Warm, well perfused, no cyanosis or edema. Skin: Skin color, texture, turgor normal.  Neurologic: Grossly normal, Cranial nerves from II to XII intact. Deep tendon reflexes normal.  Psychology: Mood stable. Lymph nodes: No palpable LN in the neck, abdomen, or groins. Radiologic / Imaging / TESTING  Ct Abdomen Pelvis W Iv Contrast Additional Contrast? Oral    Result Date: 6/24/2020  EXAMINATION: CT OF THE ABDOMEN AND PELVIS WITH CONTRAST, 6/24/2020 1:04 pm TECHNIQUE: CT of the abdomen and pelvis was performed with the administration of intravenous contrast. Multiplanar reformatted images are provided for review. Dose modulation, iterative reconstruction, and/or weight based adjustment of the mA/kV was utilized to reduce the radiation dose to as low as reasonably achievable. COMPARISON: None HISTORY: ORDERING SYSTEM PROVIDED HISTORY: RLQ abdominal pain TECHNOLOGIST PROVIDED HISTORY: Additional Contrast?->Oral Reason for Exam: RLQ pain Acuity: Acute Additional signs and symptoms: RLQ pain Relevant Medical/Surgical History: RLQ pain. 100 mL Isovue 370 inj by MV IV LE 1315, 50 mL Omnipaque 240 po FINDINGS: Lower Chest: The visualized lung bases are clear. Organs:  The liver, gallbladder, spleen, pancreas, adrenal glands, and kidneys demonstrate no acute abnormality. Mild bilateral perinephric fat stranding, nonspecific and could represent scarring. Tiny hypodensity at the lower pole of the right kidney is too small to characterize. GI/Bowel: Colonic diverticulosis without CT evidence of diverticulitis. The appendix is dilated to 11 mm with submucosal enhancement and surrounding inflammatory changes, compatible with acute appendicitis. No adjacent fluid collections are seen. Pelvis: The urinary bladder is unremarkable. Peritoneum/Retroperitoneum: No lymphadenopathy. No intraperitoneal free fluid or free air. Bones/Soft Tissues: No acute bony abnormality. Partially visualized hardware in the right proximal femur. Acute appendicitis. No evidence of abscess formation or perforation. Colonic diverticulosis without CT evidence of diverticulitis.          Labs:    Recent Results (from the past 24 hour(s))   Protime/INR & PTT    Collection Time: 06/24/20  7:26 PM   Result Value Ref Range    Protime 26.9 (H) 11.7 - 14.5 SECONDS    INR 2.21 INDEX    aPTT 52.2 (H) 25.1 - 37.1 SECONDS   Basic metabolic panel    Collection Time: 06/24/20  7:26 PM   Result Value Ref Range    Sodium 134 (L) 135 - 145 MMOL/L    Potassium 3.9 3.5 - 5.1 MMOL/L    Chloride 93 (L) 99 - 110 mMol/L    CO2 30 21 - 32 MMOL/L    Anion Gap 11 4 - 16    BUN 13 6 - 23 MG/DL    CREATININE 1.1 0.9 - 1.3 MG/DL    Glucose 98 70 - 99 MG/DL    Calcium 9.6 8.3 - 10.6 MG/DL    GFR Non-African American >60 >60 mL/min/1.73m2    GFR African American >60 >60 mL/min/1.73m2   PREPARE FRESH FROZEN PLASMA, 2 Units    Collection Time: 06/25/20  5:27 AM   Result Value Ref Range    ABO/Rh O POSITIVE    CBC    Collection Time: 06/25/20  5:28 AM   Result Value Ref Range    WBC 9.8 4.0 - 10.5 K/CU MM    RBC 5.09 4.6 - 6.2 M/CU MM    Hemoglobin 15.8 13.5 - 18.0 GM/DL    Hematocrit 46.8 42 - 52 %    MCV 91.9 78 - 100 FL    MCH 31.0 27 - 31 PG    MCHC 33.8 32.0 - 36.0 %    RDW 13.1 11.7 - 14.9 % Platelets 794 810 - 061 K/CU MM    MPV 9.8 7.5 - 11.1 FL   Basic metabolic panel    Collection Time: 06/25/20  5:28 AM   Result Value Ref Range    Sodium 135 135 - 145 MMOL/L    Potassium 4.7 3.5 - 5.1 MMOL/L    Chloride 97 (L) 99 - 110 mMol/L    CO2 32 21 - 32 MMOL/L    Anion Gap 6 4 - 16    BUN 13 6 - 23 MG/DL    CREATININE 1.1 0.9 - 1.3 MG/DL    Glucose 148 (H) 70 - 99 MG/DL    Calcium 9.3 8.3 - 10.6 MG/DL    GFR Non-African American >60 >60 mL/min/1.73m2    GFR African American >60 >60 mL/min/1.73m2   Protime-INR    Collection Time: 06/25/20  5:28 AM   Result Value Ref Range    Protime 25.9 (H) 11.7 - 14.5 SECONDS    INR 2.12 INDEX         Diagnosis:  Patient Active Problem List   Diagnosis    Essential hypertension    Factor V deficiency (HCC)    Non morbid obesity due to excess calories    Impaired fasting glucose    Hypercholesterolemia    Central stenosis of spinal canal    Lumbar radiculopathy    DDD (degenerative disc disease), lumbar    Chronic anticoagulation           Assessment & Plan:     Maria De Jesus Zaman is a very pleasant 62 y.o. male who presents with acute appendicitis, but on Coumadin, will check INR and depending on the result will proceed with the lap appy. Continue NPO/Bowel rest, IV Fluids, Pain control, Nausea control, IV Antibiotics, and checked INR and proceed with surgery after 2 units of FFPs are infused, ASAP, safely around his anticoagulation.     ____________________________________________    Armand Zepeda MD, FACS, FICS    6/25/2020  7:41 AM

## 2020-06-24 NOTE — CONSULTS
Hospitalist Consult Note      Name:  Ramon Burgess /Age/Sex: 1960  (62 y.o. male)   MRN & CSN:  [id number] & [id number] Admission Date/Time: 2020  5:37 PM   Location:  [address] [place] [address] PCP: Romi Bailey M.D. Hospital Day: 1    Assessment and Plan:   Ramon Burgess is a 62 y.o.  male  who presents with abdominal pain. CT abdomen showed features of acute appendicitis. Hospitalist Team consulted for: Medical Management. --- Acute appendicitis  General surgery is primary service for patient. Patient on empiric Zosyn, ciprofloxacin and metronidazole. Prescribed by general surgeon. Will recommend discontinuing ciprofloxacin and metronidazole as Zosyn has excellent gram-negative and anaerobic coverage. I discussed with general surgeon who agreed. Maintain n.p.o.  Start INR pending. General surgery will follow and decide if okay to proceed with surgery. Pain control per primary team.  Surgical management per primary team.  Noam Kemp to continue current maintenance fluid but primary team should monitor sodium level as fluid is hypotonic and might lead to hyponatremia. - Primary hypertension-resume lisinopril-hydrochlorothiazide after surgery. .  - Heterozygosity for factor V Leiden -takes warfarin 7.5 mg daily. Will hold for now for planned appendectomy. Should be resumed postoperatively when okay by primary team.  - Hyperlipidemia -not on treatment. Thank you for involving hospitalist service in the care of this patient. Diet Diet NPO Effective Now Exceptions are: Ice Chips, Sips with Meds   DVT Prophylaxis [x] Lovenox, []  Heparin, [] SCDs, [] Ambulation   GI Prophylaxis [] PPI,  [] H2 Blocker,  [] Carafate,  [] Diet/Tube Feeds   Code Status Full Code   Disposition  to be determined. MDM [] Low, [] Moderate,[x]  High     History of Present Illness:     Chief Complaint: Abdominal pain x1 day. Ramon Burgess is a 62 y.o.  male  who presents with above complaint.   He reports progressively worsening right lower quadrant abdominal pain since yesterday. No radiation. Dull in nature. No associated nausea or vomiting. No fever or chills. He went to his PCP today who referred him to [facility] ED where a CT abdomen confirmed acute appendicitis. He was then transferred to [facility] for surgical management. Past medical history significant for factor V Leiden mutation for which he takes warfarin, primary hypertension. At the time of my interview he reports abdominal pain is tolerable. General surgeon is waiting on INR to decide timing of surgery. Ten point ROS reviewed negative, unless as noted above    Objective:   No intake or output data in the 24 hours ending 06/24/20 1923   Vitals:   Vitals:    06/24/20 1807   BP: (!) 141/87   Pulse: 96   Resp: 18   Temp: 98.1 °F (36.7 °C)   SpO2: 95%     Physical Exam:   GEN Awake male, sitting upright in bed. EYES Pupils are equally round. No scleral erythema, discharge, or conjunctivitis. HENT Mucous membranes are moist. Oral pharynx without exudates, no evidence of thrush. NECK Supple, no apparent thyromegaly or masses. RESP breathing comfortably on room air  CARDIO/VASC S1/S2 auscultated. Regular rate. No peripheral edema. GI Abdomen is soft with right lower quadrant tenderness, no masses, or guarding. Bowel sounds are normoactive. MSK No gross joint deformities. SKIN Normal coloration, warm, dry. NEURO normal speech, no lateralizing weakness.   PSYCH Awake, alert, oriented x 3    Medications:   Medications:    sodium chloride flush  10 mL Intravenous 2 times per day    metroNIDAZOLE  500 mg Intravenous Q8H    ciprofloxacin  400 mg Intravenous Q12H    enoxaparin  40 mg Subcutaneous Daily    piperacillin-tazobactam  3.375 g Intravenous Q8H      Infusions:    dextrose 5% and 0.45% NaCl with KCl 20 mEq 125 mL/hr at 06/24/20 1906     PRN Meds: sodium chloride flush, 10 mL, PRN  potassium chloride, 10 mEq, PRN  HYDROmorphone, 1 mg, Q2H PRN  ondansetron, 4 mg, Q6H PRN          Electronically signed by Kaylyn Casiano MD on 6/24/2020 at 7:23 PM

## 2020-06-25 ENCOUNTER — ANESTHESIA (OUTPATIENT)
Dept: OPERATING ROOM | Age: 60
End: 2020-06-25
Payer: COMMERCIAL

## 2020-06-25 ENCOUNTER — ANESTHESIA EVENT (OUTPATIENT)
Dept: OPERATING ROOM | Age: 60
End: 2020-06-25
Payer: COMMERCIAL

## 2020-06-25 VITALS
DIASTOLIC BLOOD PRESSURE: 111 MMHG | TEMPERATURE: 99.3 F | OXYGEN SATURATION: 100 % | RESPIRATION RATE: 12 BRPM | SYSTOLIC BLOOD PRESSURE: 160 MMHG

## 2020-06-25 VITALS
HEIGHT: 73 IN | DIASTOLIC BLOOD PRESSURE: 66 MMHG | SYSTOLIC BLOOD PRESSURE: 132 MMHG | HEART RATE: 95 BPM | BODY MASS INDEX: 36.84 KG/M2 | OXYGEN SATURATION: 96 % | TEMPERATURE: 98.7 F | RESPIRATION RATE: 16 BRPM | WEIGHT: 278 LBS

## 2020-06-25 PROBLEM — K35.80 ACUTE APPENDICITIS: Status: ACTIVE | Noted: 2020-06-25

## 2020-06-25 LAB
ABO/RH: NORMAL
ANION GAP SERPL CALCULATED.3IONS-SCNC: 6 MMOL/L (ref 4–16)
ANTIBODY SCREEN: NEGATIVE
BUN BLDV-MCNC: 13 MG/DL (ref 6–23)
CALCIUM SERPL-MCNC: 9.3 MG/DL (ref 8.3–10.6)
CHLORIDE BLD-SCNC: 97 MMOL/L (ref 99–110)
CO2: 32 MMOL/L (ref 21–32)
CREAT SERPL-MCNC: 1.1 MG/DL (ref 0.9–1.3)
GFR AFRICAN AMERICAN: >60 ML/MIN/1.73M2
GFR NON-AFRICAN AMERICAN: >60 ML/MIN/1.73M2
GLUCOSE BLD-MCNC: 148 MG/DL (ref 70–99)
HCT VFR BLD CALC: 46.8 % (ref 42–52)
HEMOGLOBIN: 15.8 GM/DL (ref 13.5–18)
INR BLD: 1.52 INDEX
INR BLD: 2.12 INDEX
MCH RBC QN AUTO: 31 PG (ref 27–31)
MCHC RBC AUTO-ENTMCNC: 33.8 % (ref 32–36)
MCV RBC AUTO: 91.9 FL (ref 78–100)
PDW BLD-RTO: 13.1 % (ref 11.7–14.9)
PLATELET # BLD: 193 K/CU MM (ref 140–440)
PMV BLD AUTO: 9.8 FL (ref 7.5–11.1)
POTASSIUM SERPL-SCNC: 4.7 MMOL/L (ref 3.5–5.1)
PROTHROMBIN TIME: 18.5 SECONDS (ref 11.7–14.5)
PROTHROMBIN TIME: 25.9 SECONDS (ref 11.7–14.5)
RBC # BLD: 5.09 M/CU MM (ref 4.6–6.2)
SODIUM BLD-SCNC: 135 MMOL/L (ref 135–145)
WBC # BLD: 9.8 K/CU MM (ref 4–10.5)

## 2020-06-25 PROCEDURE — 88304 TISSUE EXAM BY PATHOLOGIST: CPT

## 2020-06-25 PROCEDURE — 85610 PROTHROMBIN TIME: CPT

## 2020-06-25 PROCEDURE — 6370000000 HC RX 637 (ALT 250 FOR IP): Performed by: SURGERY

## 2020-06-25 PROCEDURE — 3700000001 HC ADD 15 MINUTES (ANESTHESIA): Performed by: SURGERY

## 2020-06-25 PROCEDURE — 36430 TRANSFUSION BLD/BLD COMPNT: CPT

## 2020-06-25 PROCEDURE — 2580000003 HC RX 258: Performed by: SURGERY

## 2020-06-25 PROCEDURE — P9017 PLASMA 1 DONOR FRZ W/IN 8 HR: HCPCS

## 2020-06-25 PROCEDURE — 2580000003 HC RX 258: Performed by: ANESTHESIOLOGY

## 2020-06-25 PROCEDURE — G0378 HOSPITAL OBSERVATION PER HR: HCPCS

## 2020-06-25 PROCEDURE — 6360000002 HC RX W HCPCS: Performed by: NURSE ANESTHETIST, CERTIFIED REGISTERED

## 2020-06-25 PROCEDURE — 2500000003 HC RX 250 WO HCPCS: Performed by: NURSE ANESTHETIST, CERTIFIED REGISTERED

## 2020-06-25 PROCEDURE — 2500000003 HC RX 250 WO HCPCS: Performed by: SURGERY

## 2020-06-25 PROCEDURE — 2709999900 HC NON-CHARGEABLE SUPPLY: Performed by: SURGERY

## 2020-06-25 PROCEDURE — 86901 BLOOD TYPING SEROLOGIC RH(D): CPT

## 2020-06-25 PROCEDURE — 36415 COLL VENOUS BLD VENIPUNCTURE: CPT

## 2020-06-25 PROCEDURE — 86900 BLOOD TYPING SEROLOGIC ABO: CPT

## 2020-06-25 PROCEDURE — 3600000004 HC SURGERY LEVEL 4 BASE: Performed by: SURGERY

## 2020-06-25 PROCEDURE — 44970 LAPAROSCOPY APPENDECTOMY: CPT | Performed by: SURGERY

## 2020-06-25 PROCEDURE — 3600000014 HC SURGERY LEVEL 4 ADDTL 15MIN: Performed by: SURGERY

## 2020-06-25 PROCEDURE — 2720000010 HC SURG SUPPLY STERILE: Performed by: SURGERY

## 2020-06-25 PROCEDURE — 6360000002 HC RX W HCPCS: Performed by: SURGERY

## 2020-06-25 PROCEDURE — 80048 BASIC METABOLIC PNL TOTAL CA: CPT

## 2020-06-25 PROCEDURE — 3700000000 HC ANESTHESIA ATTENDED CARE: Performed by: SURGERY

## 2020-06-25 PROCEDURE — 85027 COMPLETE CBC AUTOMATED: CPT

## 2020-06-25 PROCEDURE — 86850 RBC ANTIBODY SCREEN: CPT

## 2020-06-25 PROCEDURE — 86927 PLASMA FRESH FROZEN: CPT

## 2020-06-25 PROCEDURE — 7100000000 HC PACU RECOVERY - FIRST 15 MIN: Performed by: SURGERY

## 2020-06-25 PROCEDURE — 7100000001 HC PACU RECOVERY - ADDTL 15 MIN: Performed by: SURGERY

## 2020-06-25 RX ORDER — HYDRALAZINE HYDROCHLORIDE 20 MG/ML
5 INJECTION INTRAMUSCULAR; INTRAVENOUS EVERY 10 MIN PRN
Status: DISCONTINUED | OUTPATIENT
Start: 2020-06-25 | End: 2020-06-25 | Stop reason: HOSPADM

## 2020-06-25 RX ORDER — SODIUM CHLORIDE, SODIUM LACTATE, POTASSIUM CHLORIDE, CALCIUM CHLORIDE 600; 310; 30; 20 MG/100ML; MG/100ML; MG/100ML; MG/100ML
INJECTION, SOLUTION INTRAVENOUS CONTINUOUS
Status: DISCONTINUED | OUTPATIENT
Start: 2020-06-25 | End: 2020-06-25 | Stop reason: HOSPADM

## 2020-06-25 RX ORDER — PROPOFOL 10 MG/ML
INJECTION, EMULSION INTRAVENOUS PRN
Status: DISCONTINUED | OUTPATIENT
Start: 2020-06-25 | End: 2020-06-25 | Stop reason: SDUPTHER

## 2020-06-25 RX ORDER — SODIUM CHLORIDE 0.9 % (FLUSH) 0.9 %
10 SYRINGE (ML) INJECTION EVERY 12 HOURS
Status: DISCONTINUED | OUTPATIENT
Start: 2020-06-25 | End: 2020-06-25 | Stop reason: HOSPADM

## 2020-06-25 RX ORDER — DEXAMETHASONE SODIUM PHOSPHATE 4 MG/ML
INJECTION, SOLUTION INTRA-ARTICULAR; INTRALESIONAL; INTRAMUSCULAR; INTRAVENOUS; SOFT TISSUE PRN
Status: DISCONTINUED | OUTPATIENT
Start: 2020-06-25 | End: 2020-06-25 | Stop reason: SDUPTHER

## 2020-06-25 RX ORDER — MIDAZOLAM HYDROCHLORIDE 1 MG/ML
INJECTION INTRAMUSCULAR; INTRAVENOUS PRN
Status: DISCONTINUED | OUTPATIENT
Start: 2020-06-25 | End: 2020-06-25 | Stop reason: SDUPTHER

## 2020-06-25 RX ORDER — LISINOPRIL AND HYDROCHLOROTHIAZIDE 12.5; 1 MG/1; MG/1
2 TABLET ORAL 2 TIMES DAILY
Status: DISCONTINUED | OUTPATIENT
Start: 2020-06-25 | End: 2020-06-25 | Stop reason: HOSPADM

## 2020-06-25 RX ORDER — ONDANSETRON 2 MG/ML
4 INJECTION INTRAMUSCULAR; INTRAVENOUS
Status: DISCONTINUED | OUTPATIENT
Start: 2020-06-25 | End: 2020-06-25 | Stop reason: HOSPADM

## 2020-06-25 RX ORDER — OXYCODONE HYDROCHLORIDE AND ACETAMINOPHEN 5; 325 MG/1; MG/1
1-2 TABLET ORAL EVERY 6 HOURS PRN
Qty: 35 TABLET | Refills: 0 | Status: SHIPPED | OUTPATIENT
Start: 2020-06-25 | End: 2020-07-02

## 2020-06-25 RX ORDER — PANTOPRAZOLE SODIUM 40 MG/10ML
40 INJECTION, POWDER, LYOPHILIZED, FOR SOLUTION INTRAVENOUS 2 TIMES DAILY
Status: DISCONTINUED | OUTPATIENT
Start: 2020-06-25 | End: 2020-06-25 | Stop reason: HOSPADM

## 2020-06-25 RX ORDER — LIDOCAINE HYDROCHLORIDE 20 MG/ML
INJECTION, SOLUTION INTRAVENOUS PRN
Status: DISCONTINUED | OUTPATIENT
Start: 2020-06-25 | End: 2020-06-25 | Stop reason: SDUPTHER

## 2020-06-25 RX ORDER — ONDANSETRON 2 MG/ML
INJECTION INTRAMUSCULAR; INTRAVENOUS PRN
Status: DISCONTINUED | OUTPATIENT
Start: 2020-06-25 | End: 2020-06-25 | Stop reason: SDUPTHER

## 2020-06-25 RX ORDER — 0.9 % SODIUM CHLORIDE 0.9 %
20 INTRAVENOUS SOLUTION INTRAVENOUS ONCE
Status: COMPLETED | OUTPATIENT
Start: 2020-06-25 | End: 2020-06-25

## 2020-06-25 RX ORDER — OXYCODONE HYDROCHLORIDE AND ACETAMINOPHEN 5; 325 MG/1; MG/1
2 TABLET ORAL EVERY 4 HOURS PRN
Status: DISCONTINUED | OUTPATIENT
Start: 2020-06-25 | End: 2020-06-25 | Stop reason: HOSPADM

## 2020-06-25 RX ORDER — FENTANYL CITRATE 50 UG/ML
INJECTION, SOLUTION INTRAMUSCULAR; INTRAVENOUS PRN
Status: DISCONTINUED | OUTPATIENT
Start: 2020-06-25 | End: 2020-06-25 | Stop reason: SDUPTHER

## 2020-06-25 RX ORDER — AMOXICILLIN 250 MG
2 CAPSULE ORAL DAILY
Qty: 60 TABLET | Refills: 3 | Status: SHIPPED | OUTPATIENT
Start: 2020-06-25 | End: 2020-07-05

## 2020-06-25 RX ORDER — FENTANYL CITRATE 50 UG/ML
25 INJECTION, SOLUTION INTRAMUSCULAR; INTRAVENOUS EVERY 5 MIN PRN
Status: DISCONTINUED | OUTPATIENT
Start: 2020-06-25 | End: 2020-06-25 | Stop reason: HOSPADM

## 2020-06-25 RX ORDER — ROCURONIUM BROMIDE 10 MG/ML
INJECTION, SOLUTION INTRAVENOUS PRN
Status: DISCONTINUED | OUTPATIENT
Start: 2020-06-25 | End: 2020-06-25 | Stop reason: SDUPTHER

## 2020-06-25 RX ADMIN — SODIUM CHLORIDE, POTASSIUM CHLORIDE, SODIUM LACTATE AND CALCIUM CHLORIDE: 600; 310; 30; 20 INJECTION, SOLUTION INTRAVENOUS at 12:56

## 2020-06-25 RX ADMIN — HYDROMORPHONE HYDROCHLORIDE 1 MG: 1 INJECTION, SOLUTION INTRAMUSCULAR; INTRAVENOUS; SUBCUTANEOUS at 01:27

## 2020-06-25 RX ADMIN — LISINOPRIL AND HYDROCHLOROTHIAZIDE 2 TABLET: 12.5; 1 TABLET ORAL at 08:47

## 2020-06-25 RX ADMIN — ONDANSETRON HYDROCHLORIDE 4 MG: 2 SOLUTION INTRAMUSCULAR; INTRAVENOUS at 09:22

## 2020-06-25 RX ADMIN — SODIUM CHLORIDE, POTASSIUM CHLORIDE, SODIUM LACTATE AND CALCIUM CHLORIDE: 600; 310; 30; 20 INJECTION, SOLUTION INTRAVENOUS at 12:31

## 2020-06-25 RX ADMIN — MIDAZOLAM 2 MG: 1 INJECTION INTRAMUSCULAR; INTRAVENOUS at 13:05

## 2020-06-25 RX ADMIN — OXYCODONE HYDROCHLORIDE AND ACETAMINOPHEN 2 TABLET: 5; 325 TABLET ORAL at 18:03

## 2020-06-25 RX ADMIN — PIPERACILLIN AND TAZOBACTAM 3.38 G: 3; .375 INJECTION, POWDER, LYOPHILIZED, FOR SOLUTION INTRAVENOUS at 15:36

## 2020-06-25 RX ADMIN — ONDANSETRON 4 MG: 2 INJECTION INTRAMUSCULAR; INTRAVENOUS at 13:53

## 2020-06-25 RX ADMIN — DEXAMETHASONE SODIUM PHOSPHATE 8 MG: 4 INJECTION, SOLUTION INTRAMUSCULAR; INTRAVENOUS at 13:10

## 2020-06-25 RX ADMIN — PIPERACILLIN AND TAZOBACTAM 3.38 G: 3; .375 INJECTION, POWDER, LYOPHILIZED, FOR SOLUTION INTRAVENOUS at 11:06

## 2020-06-25 RX ADMIN — FENTANYL CITRATE 100 MCG: 50 INJECTION INTRAMUSCULAR; INTRAVENOUS at 13:05

## 2020-06-25 RX ADMIN — PROPOFOL 300 MG: 10 INJECTION, EMULSION INTRAVENOUS at 13:05

## 2020-06-25 RX ADMIN — HYDROMORPHONE HYDROCHLORIDE 1 MG: 1 INJECTION, SOLUTION INTRAMUSCULAR; INTRAVENOUS; SUBCUTANEOUS at 04:51

## 2020-06-25 RX ADMIN — POTASSIUM CHLORIDE, DEXTROSE MONOHYDRATE AND SODIUM CHLORIDE: 150; 5; 450 INJECTION, SOLUTION INTRAVENOUS at 15:13

## 2020-06-25 RX ADMIN — SODIUM CHLORIDE, PRESERVATIVE FREE 10 ML: 5 INJECTION INTRAVENOUS at 09:25

## 2020-06-25 RX ADMIN — SODIUM CHLORIDE, POTASSIUM CHLORIDE, SODIUM LACTATE AND CALCIUM CHLORIDE: 600; 310; 30; 20 INJECTION, SOLUTION INTRAVENOUS at 13:53

## 2020-06-25 RX ADMIN — LIDOCAINE HYDROCHLORIDE 100 MG: 20 INJECTION, SOLUTION INTRAVENOUS at 13:05

## 2020-06-25 RX ADMIN — SODIUM CHLORIDE 20 ML: 9 INJECTION, SOLUTION INTRAVENOUS at 09:26

## 2020-06-25 RX ADMIN — PIPERACILLIN AND TAZOBACTAM 3.38 G: 3; .375 INJECTION, POWDER, LYOPHILIZED, FOR SOLUTION INTRAVENOUS at 02:28

## 2020-06-25 RX ADMIN — POTASSIUM CHLORIDE, DEXTROSE MONOHYDRATE AND SODIUM CHLORIDE: 150; 5; 450 INJECTION, SOLUTION INTRAVENOUS at 05:43

## 2020-06-25 RX ADMIN — SODIUM CHLORIDE, PRESERVATIVE FREE 10 ML: 5 INJECTION INTRAVENOUS at 08:49

## 2020-06-25 RX ADMIN — ROCURONIUM BROMIDE 30 MG: 10 INJECTION INTRAVENOUS at 13:18

## 2020-06-25 RX ADMIN — ONDANSETRON 4 MG: 2 INJECTION INTRAMUSCULAR; INTRAVENOUS at 13:10

## 2020-06-25 RX ADMIN — SUGAMMADEX 400 MG: 100 INJECTION, SOLUTION INTRAVENOUS at 14:08

## 2020-06-25 RX ADMIN — ROCURONIUM BROMIDE 50 MG: 10 INJECTION INTRAVENOUS at 13:05

## 2020-06-25 ASSESSMENT — PULMONARY FUNCTION TESTS
PIF_VALUE: 35
PIF_VALUE: 0
PIF_VALUE: 24
PIF_VALUE: 25
PIF_VALUE: 35
PIF_VALUE: 0
PIF_VALUE: 35
PIF_VALUE: 18
PIF_VALUE: 35
PIF_VALUE: 2
PIF_VALUE: 17
PIF_VALUE: 22
PIF_VALUE: 35
PIF_VALUE: 24
PIF_VALUE: 23
PIF_VALUE: 28
PIF_VALUE: 19
PIF_VALUE: 18
PIF_VALUE: 35
PIF_VALUE: 0
PIF_VALUE: 0
PIF_VALUE: 1
PIF_VALUE: 31
PIF_VALUE: 35
PIF_VALUE: 28
PIF_VALUE: 29
PIF_VALUE: 23
PIF_VALUE: 24
PIF_VALUE: 15
PIF_VALUE: 18
PIF_VALUE: 35
PIF_VALUE: 35
PIF_VALUE: 24
PIF_VALUE: 36
PIF_VALUE: 3
PIF_VALUE: 22
PIF_VALUE: 35
PIF_VALUE: 22
PIF_VALUE: 35
PIF_VALUE: 35
PIF_VALUE: 0
PIF_VALUE: 35
PIF_VALUE: 19
PIF_VALUE: 35
PIF_VALUE: 21
PIF_VALUE: 35
PIF_VALUE: 17
PIF_VALUE: 30
PIF_VALUE: 1
PIF_VALUE: 35
PIF_VALUE: 35
PIF_VALUE: 22
PIF_VALUE: 18
PIF_VALUE: 23
PIF_VALUE: 32
PIF_VALUE: 19
PIF_VALUE: 35
PIF_VALUE: 18
PIF_VALUE: 9
PIF_VALUE: 18
PIF_VALUE: 35
PIF_VALUE: 23
PIF_VALUE: 22
PIF_VALUE: 1
PIF_VALUE: 35
PIF_VALUE: 25
PIF_VALUE: 13
PIF_VALUE: 35
PIF_VALUE: 22
PIF_VALUE: 35
PIF_VALUE: 23
PIF_VALUE: 35
PIF_VALUE: 0
PIF_VALUE: 22
PIF_VALUE: 35
PIF_VALUE: 19
PIF_VALUE: 35

## 2020-06-25 ASSESSMENT — PAIN SCALES - GENERAL
PAINLEVEL_OUTOF10: 5
PAINLEVEL_OUTOF10: 3
PAINLEVEL_OUTOF10: 0
PAINLEVEL_OUTOF10: 2
PAINLEVEL_OUTOF10: 1
PAINLEVEL_OUTOF10: 2
PAINLEVEL_OUTOF10: 5
PAINLEVEL_OUTOF10: 2
PAINLEVEL_OUTOF10: 3
PAINLEVEL_OUTOF10: 5

## 2020-06-25 ASSESSMENT — PAIN DESCRIPTION - DESCRIPTORS
DESCRIPTORS: ACHING;CONSTANT;DISCOMFORT
DESCRIPTORS: ACHING;CONSTANT;DISCOMFORT

## 2020-06-25 ASSESSMENT — PAIN DESCRIPTION - PROGRESSION
CLINICAL_PROGRESSION: GRADUALLY IMPROVING
CLINICAL_PROGRESSION: NOT CHANGED

## 2020-06-25 ASSESSMENT — PAIN DESCRIPTION - PAIN TYPE
TYPE: ACUTE PAIN

## 2020-06-25 ASSESSMENT — PAIN DESCRIPTION - LOCATION
LOCATION: ABDOMEN

## 2020-06-25 ASSESSMENT — PAIN DESCRIPTION - ONSET
ONSET: ON-GOING
ONSET: ON-GOING

## 2020-06-25 ASSESSMENT — PAIN DESCRIPTION - ORIENTATION
ORIENTATION: RIGHT;LOWER

## 2020-06-25 ASSESSMENT — PAIN DESCRIPTION - FREQUENCY
FREQUENCY: CONTINUOUS
FREQUENCY: CONTINUOUS

## 2020-06-25 NOTE — OP NOTE
fascial defect. The Arin cannula was  introduced under direct visualization. Pneumoperitoneum was instituted using  CO2 insufflation up to 14 mmHg pressure. A 5-mm 30-degree scope was  introduced, and confirmation of the diagnosis was obvious. Some reactive  ascites was noted in the pelvis. No perforation, no suppurative  inflammation. Two 5-mm ports were placed, suprapubic midline port and left  lower quadrant midclavicular line port. All ports were 12 cm apart one from  the other in a triangular shape fashion. Lysis of adhesions between the appendix and the lateral pelvic wall, and omentum. The mesoappendix was taken  meticulously using the Harmonic scalpel uneventfully. The base of the  appendix was circumferentially skeletonized, triply ligated using 0 looped  PDS, 2 on the cecal side, 1 on the appendix side. The appendix was divided  in between. It was then placed into the EndoCatch bag introduced through the  umbilical port after switching the camera to the left lower quadrant port. Suction of the reactive ascites was performed. The appendix was sent to  permanent pathology. All ports were removed under direct visualization  without any evidence of port site bleeding. Pneumoperitoneum was evacuated. The preplaced #0 Vicryl was tied to approximate the mid umbilical fascial  defect. The skin of all wounds were approximated using 4-0 Monocryl in a  running subcuticular fashion, followed by SureClose skin glue after further  more local anesthetic was injected. Patient tolerated both procedures well very well without any complications,  was extubated in the OR, and sent to the PACU in stable condition.     ____________________________________________    Alfonso Dutta MD, FACS, FICS    6/25/2020  2:14 PM

## 2020-06-25 NOTE — PLAN OF CARE
Problem: Pain:  Goal: Pain level will decrease  Description: Pain level will decrease  6/25/2020 1141 by Johnathan Thompson RN  Outcome: Ongoing  6/25/2020 1141 by Johnathan Thompson RN  Outcome: Ongoing  6/25/2020 0023 by Gabriella Pedraza RN  Outcome: Ongoing  Goal: Control of acute pain  Description: Control of acute pain  6/25/2020 1141 by Johnathan Thompson RN  Outcome: Ongoing  6/25/2020 1141 by Johnathan Thompson RN  Outcome: Ongoing  6/25/2020 0023 by Gabriella Pedraza RN  Outcome: Ongoing  Goal: Control of chronic pain  Description: Control of chronic pain  6/25/2020 1141 by Johnathan Thompson RN  Outcome: Ongoing  6/25/2020 1141 by Johnathan Thompson RN  Outcome: Ongoing  6/25/2020 0023 by Gabriella Pedraza RN  Outcome: Ongoing

## 2020-06-25 NOTE — PROGRESS NOTES
1420- arrived to PACU in semi-fowlers position, monitors applied alarms on oriented to unit.  Handoff report received from [facility]  1440- resting with snoring respirations, minimal pain  1500- turned and repositioned tolerated well  1520- Handoff report called to Kendall Talley- transferred to [facility] per bed

## 2020-06-25 NOTE — DISCHARGE SUMMARY
Physician Discharge Summary     Patient ID:  Augusto Balbuena  [id number]  72 y.o.  1960    Admit date: 6/24/2020    Discharge date: 6/25/2020     Admitting Physician: Bernard Laura MD     Admission Diagnoses: Acute appendicitis [K35.80]  Patient Active Problem List   Diagnosis    Essential hypertension    Factor V deficiency ([facility] [address]    Non morbid obesity due to excess calories    Impaired fasting glucose    Hypercholesterolemia    Central stenosis of spinal canal    Lumbar radiculopathy    DDD (degenerative disc disease), lumbar    Chronic anticoagulation    Acute appendicitis     Past Medical History:   Diagnosis Date    Factor 5 Leiden mutation, heterozygous ([facility] [address]     Hypertension     Hypertriglyceridemia 12/17/2016    Non morbid obesity due to excess calories 12/17/2016       Discharge Diagnoses: same    Admission Condition: fair    Discharged Condition: good    Indication for Admission: acute appendicitis, with factor V Layden defic., was admitted last night, and received 2 U of FFP this morning and then taken to surgery, lap appendectomy, did well and will resume his Coumadin tomorrow, and he will be discharged today, and f/u in clinic next week. Hospital Course: Patient had an uneventful hospital course after the above mentioned treatment, and was tolerating diet and ambulating without difficulty at the time of discharge. Consults: Hospitalist.    Treatments:  IV hydration, antibiotics, analgesia, cardiac meds, anticoagulation, respiratory therapy / incentive spirometry,  and surgery: As above and please refer to daily progress notes for details.     Discharge Exam:  /82   Pulse 74   Temp 99.2 °F (37.3 °C) (Temporal)   Resp 15   Ht 6' 1\" (1.854 m)   Wt 278 lb (126.1 kg)   SpO2 96%   BMI 36.68 kg/m²     General Appearance:    Alert, cooperative, no distress, appears stated age   Head:    Normocephalic, without obvious abnormality, atraumatic   Eyes:    PERRL, conjunctiva/corneas clear, EOM's intact, fundi     benign, both eyes        Ears:    Normal TM's and external ear canals, both ears   Nose:   Nares normal, septum midline, mucosa normal, no drainage    or sinus tenderness   Throat:   Lips, mucosa, and tongue normal; teeth and gums normal   Neck:   Supple, symmetrical, trachea midline, no adenopathy;        thyroid:  No enlargement/tenderness/nodules; no carotid    bruit or JVD   Back:     Symmetric, no curvature, ROM normal, no CVA tenderness   Lungs:     Clear to auscultation bilaterally, respirations unlabored   Chest wall:    No tenderness or deformity   Heart:    Regular rate and rhythm, S1 and S2 normal, no murmur, rub   or gallop   Abdomen:     Soft, non-tender, bowel sounds active all four quadrants,     no masses, no organomegaly   Genitalia:    Normal male without lesion, discharge or tenderness   Rectal:    Normal tone, normal prostate, no masses or tenderness;    guaiac negative stool   Extremities:   Extremities normal, atraumatic, no cyanosis or edema   Pulses:   2+ and symmetric all extremities   Skin:   Skin color, texture, turgor normal, no rashes or lesions   Lymph nodes:   Cervical, supraclavicular, and axillary nodes normal   Neurologic:   CNII-XII intact. Normal strength, sensation and reflexes       throughout       Discharge vitals:    Wt Readings from Last 3 Encounters:   06/24/20 278 lb (126.1 kg)   11/20/17 266 lb (120.7 kg)   10/02/17 264 lb 6.4 oz (119.9 kg)   ,  Temp Readings from Last 3 Encounters:   06/25/20 99.2 °F (37.3 °C) (Temporal)   06/25/20 99.3 °F (37.4 °C)   ,  BP Readings from Last 3 Encounters:   06/25/20 137/82   06/25/20 (!) 160/111   11/20/17 120/80   ,  Pulse Readings from Last 3 Encounters:   06/25/20 74   11/20/17 96   10/02/17 98        Disposition: home    Patient Instructions:   Current Discharge Medication List      START taking these medications    Details   oxyCODONE-acetaminophen (PERCOCET) 5-325 MG per tablet Take 1-2 tablets by mouth every 6 hours as needed for Pain for up to 7 days. Qty: 35 tablet, Refills: 0    Comments: Reduce doses taken as pain becomes manageable  Associated Diagnoses: Acute appendicitis with localized peritonitis, without perforation, abscess, or gangrene      senna-docusate (SENOKOT S) 8.6-50 MG per tablet Take 2 tablets by mouth daily for 10 days  Qty: 60 tablet, Refills: 3         CONTINUE these medications which have NOT CHANGED    Details   gabapentin (NEURONTIN) 300 MG capsule Take 300 mg by mouth 3 times daily. warfarin (COUMADIN) 7.5 MG tablet Take 1 tablet by mouth daily  Qty: 90 tablet, Refills: 1      triamcinolone (KENALOG) 0.1 % cream Apply topically 2 times daily as needed      lisinopril-hydrochlorothiazide (PRINZIDE;ZESTORETIC) 20-25 MG per tablet Take 1 tablet by mouth 2 times daily  Qty: 180 tablet, Refills: 1    Associated Diagnoses: Essential hypertension      B-Complex TABS Take 1 tablet by mouth daily      traMADol (ULTRAM) 50 MG tablet Take 50 mg by mouth every 6 hours as needed for Pain           Activity: activity as tolerated  Diet: regular diet  Wound Care: keep wound clean and dry.     Call  [phone]  to make a follow-up appointment  with Dr Candy Cortez MD, [facility] in 1 week.    ____________________________________________    Signed:      Candy Cortez MD, FACS, FICS    6/25/2020  2:21 PM

## 2020-06-25 NOTE — PROGRESS NOTES
Dr Malathi Hernandez called and spoke with this nurse. He states to start FFP transfusion at 0900. Transfusion to be finished by 1000. This nurse notified blood bank of time. Blood bank to be called when FFP ready to be transfused.

## 2020-06-25 NOTE — ANESTHESIA PRE PROCEDURE
Department of Anesthesiology  Preprocedure Note       Name:  Richard Campa   Age:  62 y.o.  :  1960                                          MRN:  [id number]         Date:  2020      Surgeon: Herson Hamm):  Shirley Grayson MD    Procedure: Procedure(s):  APPENDECTOMY LAPAROSCOPIC    Medications prior to admission:   Prior to Admission medications    Medication Sig Start Date End Date Taking? Authorizing Provider   gabapentin (NEURONTIN) 300 MG capsule Take 300 mg by mouth 3 times daily.    Yes Historical Provider, MD   warfarin (COUMADIN) 7.5 MG tablet Take 1 tablet by mouth daily 10/30/18  Yes Sánchez Haji MD   triamcinolone (KENALOG) 0.1 % cream Apply topically 2 times daily as needed   Yes Historical Provider, MD   lisinopril-hydrochlorothiazide (PRINZIDE;ZESTORETIC) 20-25 MG per tablet Take 1 tablet by mouth 2 times daily 17  Yes Jerrod Ortega MD   B-Complex TABS Take 1 tablet by mouth daily   Yes Historical Provider, MD   traMADol (ULTRAM) 50 MG tablet Take 50 mg by mouth every 6 hours as needed for Pain    Historical Provider, MD       Current medications:    Current Facility-Administered Medications   Medication Dose Route Frequency Provider Last Rate Last Dose    0.9 % sodium chloride bolus  20 mL Intravenous Once Shirley Grayson MD        sodium chloride flush 0.9 % injection 10 mL  10 mL Intravenous Q12H Shirley Grayson MD        lisinopril-hydroCHLOROthiazide (PRINZIDE;ZESTORETIC) 10-12.5 MG per tablet 2 tablet  2 tablet Oral BID Shirley Grayson MD   2 tablet at 20 0847    sodium chloride flush 0.9 % injection 10 mL  10 mL Intravenous 2 times per day Shirley Grayson MD   10 mL at 20 0849    sodium chloride flush 0.9 % injection 10 mL  10 mL Intravenous PRN Shirley Grayson MD        potassium chloride 10 mEq/100 mL IVPB (Peripheral Line)  10 mEq Intravenous PRN Shirley Grayson MD        HYDROmorphone (DILAUDID) injection 1 mg  1 mg Intravenous Q2H PRN Geovany Claudio MD   1 mg at 20 0451    ondansetron (ZOFRAN) injection 4 mg  4 mg Intravenous Q6H PRN Geovany Claudio MD        enoxaparin (LOVENOX) injection 40 mg  40 mg Subcutaneous Daily Geovany Claudio MD   40 mg at 20 1910    dextrose 5 % and 0.45 % NaCl with KCl 20 mEq infusion   Intravenous Continuous Geovany Claudio  mL/hr at 20 0543      piperacillin-tazobactam (ZOSYN) 3.375 g in dextrose 5 % 50 mL IVPB infusion  3.375 g Intravenous Q6H Geovany Claudio MD   Stopped at 20 0258       Allergies:  No Known Allergies    Problem List:    Patient Active Problem List   Diagnosis Code    Essential hypertension I10    Factor V deficiency ([facility] [address] D68.2    Non morbid obesity due to excess calories E66.09    Impaired fasting glucose R73.01    Hypercholesterolemia E78.00    Central stenosis of spinal canal M48.00    Lumbar radiculopathy M54.16    DDD (degenerative disc disease), lumbar M51.36    Chronic anticoagulation Z79.01       Past Medical History:        Diagnosis Date    Factor 5 Leiden mutation, heterozygous ([facility] [address]     Hypertension     Hypertriglyceridemia 2016    Non morbid obesity due to excess calories 2016       Past Surgical History:        Procedure Laterality Date    LEG SURGERY Right     LEG SURGERY Left     SHOULDER SURGERY Right        Social History:    Social History     Tobacco Use    Smoking status: Former Smoker     Packs/day: 2.00     Years: 30.00     Pack years: 60.00     Types: Cigarettes     Last attempt to quit: 2006     Years since quittin.9    Smokeless tobacco: Never Used   Substance Use Topics    Alcohol use:  Yes     Alcohol/week: 12.0 standard drinks     Types: 12 Cans of beer per week     Comment: weekly                                Counseling given: Not Answered      Vital Signs (Current):   Vitals:    20 0013 20 0333 20 0803 20 0804   BP: 106/68 117/67 136/87 summary reviewed and Nursing notes reviewed  Airway: Mallampati: II  TM distance: >3 FB   Neck ROM: full  Mouth opening: > = 3 FB Dental:      Comment: Loose bottom front tooth    Pulmonary:normal exam                               Cardiovascular:    (+) hypertension:, hyperlipidemia      ECG reviewed               Beta Blocker:  Not on Beta Blocker      ROS comment: Sinus tachycardia   Otherwise normal ECG   No previous ECGs available   Confirmed by [facility] MD, [facility] ([id number]) on 1/28/2020 6:32:35 PM      Neuro/Psych:   (+) neuromuscular disease:,              ROS comment: etoh 12 week  GI/Hepatic/Renal:   (+) morbid obesity          Endo/Other:    (+) blood dyscrasia: anticoagulation therapy and Factor V:., .          Pt had no PAT visit        ROS comment: 2 units of ffp for increased bleeding times  Abdominal:           Vascular:                                    Anesthesia Plan      general     ASA 3     (Chart review only   No covid testing )  Induction: intravenous. MIPS: Postoperative opioids intended. Anesthetic plan and risks discussed with patient. Plan discussed with CRNA and attending.     Attending anesthesiologist reviewed and agrees with Pre Eval content            CHRISTINE Fleming - CRNA   6/25/2020

## 2020-06-25 NOTE — CARE COORDINATION
Chart reviewed and screened for possible needs at discharge. Pt was admitted for abdominal pain. Pt states lives at home with his spouse and was independent PTA. Pt has PCP and insurance to help w/ RX's. Pt was independent PTA. Discharge plan is to return home w/ wife. CM available for possible needs at discharge.

## 2020-06-26 LAB
ABO/RH: NORMAL
COMPONENT: NORMAL
COMPONENT: NORMAL
STATUS: NORMAL
STATUS: NORMAL
TRANSFUSION STATUS: NORMAL
TRANSFUSION STATUS: NORMAL
UNIT DIVISION: 0
UNIT DIVISION: 0
UNIT NUMBER: NORMAL
UNIT NUMBER: NORMAL

## 2020-06-29 ENCOUNTER — TELEPHONE (OUTPATIENT)
Dept: PHARMACY | Age: 60
End: 2020-06-29

## 2020-07-06 ENCOUNTER — TELEPHONE (OUTPATIENT)
Dept: PHARMACY | Age: 60
End: 2020-07-06

## 2020-07-06 NOTE — TELEPHONE ENCOUNTER
Called to remind and prescreen. Patient has to go to [employer] on Tuesday and wouldn't be able to go until the afternoon. Advised to call clinic if he can't go before 4pm. Patient states he could go Wednesday. Advised Thursday or Friday would be preferred to Wednesday. Patient states he doesn't understand the big deal if the result is received the next day. Advised we prefer the results the same day so we can change the dose for that day if needed.  Advised patient to call Kelechi daly to discuss further if he can't make it in on Tuesday before 4pm.     CLINICAL PHARMACY CONSULT: MED RECONCILIATION/REVIEW ADDENDUM    For Pharmacy Admin Tracking Only    PHSO: Yes  Total # of Interventions Recommended: 0  Total Interventions Accepted: 0  Time Spent (min): 5    Beltran Bermeo, TeddyD

## 2020-07-07 ENCOUNTER — ANTI-COAG VISIT (OUTPATIENT)
Dept: PHARMACY | Age: 60
End: 2020-07-07
Payer: COMMERCIAL

## 2020-07-07 LAB
INR BLD: 2.5 INDEX
PROTHROMBIN TIME: 29 SECONDS (ref 11.7–14.5)

## 2020-07-07 PROCEDURE — 36415 COLL VENOUS BLD VENIPUNCTURE: CPT

## 2020-07-07 PROCEDURE — 99211 OFF/OP EST MAY X REQ PHY/QHP: CPT

## 2020-07-07 PROCEDURE — 85610 PROTHROMBIN TIME: CPT

## 2020-07-07 NOTE — PROGRESS NOTES
Medication Management Service  [address] [phone]    Visit Date: 7/7/2020   Subjective: All appointments have been changed to telephone visits at this time due to COVID-19. Gurjit Arango is a 62 y.o. male who is having INR checked by [facility]. INR results were sent to the clinic for anticoagulation monitoring and adjustment. Patient results called in to clinic for warfarin management due to  Indication:   factor V Leiden mutation. INR goal: of 2.0-3.0. Duration of therapy: indefinite. Patient reports the following:   Adherent with regimen  Missed or extra doses:  Warfarin was held 2 days. Patient took warfarin 10mg on 6/26 then resumed 7.5mg warfarin daily. Bleeding or thromboembolic side effects:  None  Significant medication changes:  Patient received FFP prior to surgery. Patient states he was prescribed but has not taken any oxycodone. Patient not on antibiotics. Significant dietary changes: None  Significant alcohol or tobacco changes: None  Significant recent illness, disease state changes, or hospitalization: Patient had appendix 2 weeks ago. Upcoming surgeries or procedures:  None  Falls: None           Assessment and Plan     PT/INR performed by Lab. INR today is 2.5, therapeutic. Plan: Will continue current regimen of warfarin 7.5mg daily. Recheck INR in 10 week(s). Patient has standing lab orders for PT/INR. Patient verbalized understanding of dosing directions and information discussed. Progress note sent to referring office. Patient acknowledges working in consult agreement with pharmacist as referred by his/her physician. Patient accepted that for purposes of billing, this is a virtual visit with your provider for which we will submit a claim for reimbursement with your insurance company.  You may be responsible for any copays, coinsurance amounts or other amounts not covered by your insurance company.      Electronically signed by Vince Doyle on 7/7/20 at 9:09 AM EDT    CLINICAL PHARMACY CONSULT: MED RECONCILIATION/REVIEW Loretta Choe [address]. Tracking Only    PHSO: Yes  Total # of Interventions Recommended: 0  - Maintenance Safety Lab Monitoring #: 1  Total Interventions Accepted: 0  Time Spent (min): 15    Niraj Holt PharmD

## 2020-07-08 ENCOUNTER — OFFICE VISIT (OUTPATIENT)
Dept: BARIATRICS/WEIGHT MGMT | Age: 60
End: 2020-07-08

## 2020-07-08 VITALS
WEIGHT: 278 LBS | DIASTOLIC BLOOD PRESSURE: 90 MMHG | RESPIRATION RATE: 16 BRPM | HEIGHT: 73 IN | HEART RATE: 92 BPM | SYSTOLIC BLOOD PRESSURE: 132 MMHG | OXYGEN SATURATION: 95 % | TEMPERATURE: 98.3 F | BODY MASS INDEX: 36.84 KG/M2

## 2020-07-08 PROBLEM — Z90.49 S/P LAPAROSCOPIC APPENDECTOMY: Status: ACTIVE | Noted: 2020-07-08

## 2020-07-08 PROCEDURE — 99024 POSTOP FOLLOW-UP VISIT: CPT | Performed by: SURGERY

## 2020-07-08 NOTE — PROGRESS NOTES
GENERAL SURGERY OFFICE NOTE    SUBJECTIVE:    Patient presenting today referred from Raffaele Alas M.D. and No ref. provider found, for   Chief Complaint   Patient presents with    Follow Up After Procedure     Lap Nithyay @ [facility] 20        HPI: Alix Flower is a 62 y.o. male presenting in first, follow up 2 weeks post op Lap Appy. Procedure:  Laparoscopic Appendectomy. Final Pathologic Diagnosis:  Vermiform appendix:       Acute appendicitis. Wounds very nicely healing, no erythema, no induration, no purulent discharge. No constipation, BMs back to normal.    Thoroughly reviewed the patient's medical history, family history, social history and review of systems with the patient today in the office. Please see medical record for pertinent positives.       Past Medical History:   Diagnosis Date    Factor 5 Leiden mutation, heterozygous ([facility] [address]     Hypertension     Hypertriglyceridemia 2016    Non morbid obesity due to excess calories 2016        Past Surgical History:   Procedure Laterality Date    LAPAROSCOPIC APPENDECTOMY N/A 2020    APPENDECTOMY LAPAROSCOPIC performed by Beto Dominguez MD at [address] [facility] SURGERY Left 2007    SHOULDER SURGERY Right         Social History     Socioeconomic History    Marital status:      Spouse name: Not on file    Number of children: Not on file    Years of education: Not on file    Highest education level: Not on file   Occupational History    Not on file   Social Needs    Financial resource strain: Not on file    Food insecurity     Worry: Not on file     Inability: Not on file    Transportation needs     Medical: Not on file     Non-medical: Not on file   Tobacco Use    Smoking status: Former Smoker     Packs/day: 2.00     Years: 30.00     Pack years: 60.00     Types: Cigarettes     Last attempt to quit: 2006     Years since quittin.9    Smokeless tobacco: Never Used Substance and Sexual Activity    Alcohol use: Yes     Alcohol/week: 12.0 standard drinks     Types: 12 Cans of beer per week     Comment: weekly    Drug use: No    Sexual activity: Not Currently   Lifestyle    Physical activity     Days per week: Not on file     Minutes per session: Not on file    Stress: Not on file   Relationships    Social connections     Talks on phone: Not on file     Gets together: Not on file     Attends Congregational service: Not on file     Active member of club or organization: Not on file     Attends meetings of clubs or organizations: Not on file     Relationship status: Not on file    Intimate partner violence     Fear of current or ex partner: Not on file     Emotionally abused: Not on file     Physically abused: Not on file     Forced sexual activity: Not on file   Other Topics Concern    Not on file   Social History Narrative    Not on file        No Known Allergies     The patient has a family history of    Current Outpatient Medications   Medication Sig Dispense Refill    gabapentin (NEURONTIN) 300 MG capsule Take 300 mg by mouth 3 times daily.  warfarin (COUMADIN) 7.5 MG tablet Take 1 tablet by mouth daily 90 tablet 1    triamcinolone (KENALOG) 0.1 % cream Apply topically 2 times daily as needed      lisinopril-hydrochlorothiazide (PRINZIDE;ZESTORETIC) 20-25 MG per tablet Take 1 tablet by mouth 2 times daily 180 tablet 1    traMADol (ULTRAM) 50 MG tablet Take 50 mg by mouth every 6 hours as needed for Pain      B-Complex TABS Take 1 tablet by mouth daily       No current facility-administered medications for this visit. Review of Systems      OBJECTIVE:    BP (!) 132/90 (Site: Right Upper Arm, Position: Sitting, Cuff Size: Medium Adult)   Pulse 92   Temp 98.3 °F (36.8 °C) (Infrared)   Resp 16   Ht 6' 1\" (1.854 m)   Wt 278 lb (126.1 kg)   SpO2 95%   BMI 36.68 kg/m²    Body mass index is 36.68 kg/m².     Physical Exam        ASSESSMENT & PLAN:    1. S/P laparoscopic appendectomy         Doing very well, f/u PRN. Monday RTW NO activity restriction. Patient counseled on the risks, benefits, and alternatives of treatment plan at length while in the office today. Patient states an understanding and willingness to proceed with the plan. Follow Up:  Return for PRN - As needed for any new symptom.       Jorge Contreras MD, FACS, FICS.    7/8/20

## 2020-07-16 ENCOUNTER — HOSPITAL ENCOUNTER (OUTPATIENT)
Age: 60
Discharge: HOME | End: 2020-07-16
Payer: COMMERCIAL

## 2020-07-16 LAB — SODIUM BLD-SCNC: 139 MMOL/L (ref 135–145)

## 2020-07-16 PROCEDURE — 36415 COLL VENOUS BLD VENIPUNCTURE: CPT

## 2020-07-16 PROCEDURE — 84295 ASSAY OF SERUM SODIUM: CPT

## 2020-08-14 ENCOUNTER — TELEPHONE (OUTPATIENT)
Dept: PHARMACY | Age: 60
End: 2020-08-14

## 2020-09-14 ENCOUNTER — TELEPHONE (OUTPATIENT)
Dept: PHARMACY | Age: 60
End: 2020-09-14

## 2020-09-14 NOTE — TELEPHONE ENCOUNTER
Patient will go to lab after he gets off work at 5:00pm.  [facility] results may not be called to him until the following day. Recent Travel Screening and Travel History documentation:     Travel Screening     Question   Response    In the last month, have you been in contact with someone who was confirmed or suspected to have Coronavirus / COVID-19? No / Unsure    Do you have any of the following symptoms? Joint pain    Have you traveled internationally in the last month?   No      Travel History   Travel since 08/14/20     No documented travel since 08/14/20             CLINICAL PHARMACY CONSULT: [facility]IG[facility] [place] Tracking Only    PHSO: No  Total # of Interventions Recommended: 0  Total Interventions Accepted: 0  Time Spent (min): 5    Cyndi Ayoub, TeddyD

## 2020-09-15 ENCOUNTER — ANTI-COAG VISIT (OUTPATIENT)
Dept: PHARMACY | Age: 60
End: 2020-09-15
Payer: COMMERCIAL

## 2020-09-15 LAB
INR BLD: 1.9 INDEX
PROTHROMBIN TIME: 22 SECONDS (ref 11.7–14.5)

## 2020-09-15 PROCEDURE — 85610 PROTHROMBIN TIME: CPT

## 2020-09-15 PROCEDURE — 36415 COLL VENOUS BLD VENIPUNCTURE: CPT

## 2020-09-16 PROCEDURE — 99211 OFF/OP EST MAY X REQ PHY/QHP: CPT

## 2020-09-16 NOTE — PROGRESS NOTES
Medication Management Service  Robert Becker [address] [phone]  Margaux Rob [phone]    Visit Date: 9/15/2020   Subjective: All appointments have been changed to telephone visits at this time due to COVID-19. Juan F Mondragon is a 62 y.o. male who is having INR checked by [facility]. INR results were sent to the clinic for anticoagulation monitoring and adjustment. Patient results called in to clinic for warfarin management due to  Indication:   factor V Leiden mutation. INR goal: of 2.0-3.0. Duration of therapy: indefinite. Patient reports the following:   Adherent with regimen  Missed or extra doses:  Patient had tooth pulled a few weeks ago and held 3 days prior. Bleeding or thromboembolic side effects:  None  Significant medication changes:  None  Significant dietary changes: None  Significant alcohol or tobacco changes: None  Significant recent illness, disease state changes, or hospitalization:  None  Upcoming surgeries or procedures:  None  Falls: None           Assessment and Plan     PT/INR performed by Lab. INR yesterday was 1.9, slightly below goal range. Plan:  Take warfarin 10mg x 1 then will continue current regimen of warfarin 7.5mg daily. Recheck INR in 9 week(s). Patient has standing lab orders for PT/INR. Patient verbalized understanding of dosing directions and information discussed. Progress note sent to referring office. Patient acknowledges working in consult agreement with pharmacist as referred by his/her physician. Patient accepted that for purposes of billing, this is a virtual visit with your provider for which we will submit a claim for reimbursement with your insurance company. You may be responsible for any copays, coinsurance amounts or other amounts not covered by your insurance company.      Electronically signed by Janis Sy, [address] [place] [address] on 9/16/20 at 7:51 AM EDT    CLINICAL PHARMACY CONSULT: MED RECONCILIATION/REVIEW ADDENDUM    For Pharmacy Admin Tracking Only    PHSO: No  Total # of Interventions Recommended: 1  - Increased Dose #: 1  - Maintenance Safety Lab Monitoring #: 1  Total Interventions Accepted: 1  Time Spent (min): 15    Teddy McgarryD

## 2020-11-03 ENCOUNTER — TELEPHONE (OUTPATIENT)
Dept: PHARMACY | Age: 60
End: 2020-11-03

## 2020-11-03 NOTE — TELEPHONE ENCOUNTER
Patient to have knee replacement with Dr. David Shepard on 11/24. Patient states he can not give himself an injection and would prefer not to bridge with Lovenox if at all possible. Left message with Juliette at Dr. Johnson Led - she will check on post-op anticoag plan.      CLINICAL PHARMACY CONSULT: MED RECONCILIATION/REVIEW ADDENDUM    For Pharmacy Admin Tracking Only    PHSO: No  Total # of Interventions Recommended: 0  Total Interventions Accepted: 0  Time Spent (min): 5    Rand Abbott, TeddyD

## 2020-11-16 ENCOUNTER — TELEPHONE (OUTPATIENT)
Dept: PHARMACY | Age: 60
End: 2020-11-16

## 2020-11-16 NOTE — TELEPHONE ENCOUNTER
Recent Travel Screening and Travel History documentation:     Travel Screening     Question   Response    In the last month, have you been in contact with someone who was confirmed or suspected to have Coronavirus / COVID-19? Unable to assess    Have you had a COVID-19 viral test in the last 14 days? Unable to assess    Do you have any of the following new or worsening symptoms? Unable to assess    Have you traveled internationally in the last month?   Unable to assess      Travel History   Travel since 10/16/20     No documented travel since 10/16/20         CLINICAL PHARMACY CONSULT: [facility] [place] Tracking Only    PHSO: No  Total # of Interventions Recommended: 0  Total Interventions Accepted: 0  Time Spent (min): 5    Geovanny Lockett, PharmD

## 2020-11-17 ENCOUNTER — ANTI-COAG VISIT (OUTPATIENT)
Dept: PHARMACY | Age: 60
End: 2020-11-17
Payer: COMMERCIAL

## 2020-11-17 LAB
INR BLD: 1.94 INDEX
PROTHROMBIN TIME: 22.4 SECONDS (ref 11.7–14.5)

## 2020-11-17 PROCEDURE — 85610 PROTHROMBIN TIME: CPT

## 2020-11-17 PROCEDURE — 36415 COLL VENOUS BLD VENIPUNCTURE: CPT

## 2020-11-18 PROCEDURE — 99211 OFF/OP EST MAY X REQ PHY/QHP: CPT

## 2020-11-18 NOTE — PROGRESS NOTES
Medication Management Service  [address] [phone]  [facility] [phone]    Visit Date: 11/17/2020   Subjective: All appointments have been changed to telephone visits at this time due to COVID-19. Amos Quick is a 62 y.o. male who is having INR checked by [employer]. INR results were sent to the clinic for anticoagulation monitoring and adjustment. Patient results called in to clinic for warfarin management due to  Indication:   factor V Leiden mutation. INR goal: of 2.0-3.0. Duration of therapy: indefinite. Patient reports the following:   Adherent with regimen  Missed or extra doses:  None   Bleeding or thromboembolic side effects:  None  Significant medication changes:  None  Significant dietary changes: None  Significant alcohol or tobacco changes: None  Significant recent illness, disease state changes, or hospitalization:  None  Upcoming surgeries or procedures:  None  Falls: None           Assessment and Plan     PT/INR performed by Lab. INR today is 1.94, therapeutic, but slightly below goal range due to increased vitamin K intake. Plan: Will continue current regimen of warfarin 7.5mg daily. Patient scheduled for knee replacement 11/24 and was instructed by Dr. Yamile Alcala office to hold warfarin for 4 days prior. Per Dr. Yamile Alcala office, patient does not need to bridge with Lovenox. Patient will be at [facility] at least 1 night. Recheck INR in 2 week(s). Tentatively about 1 week after procedure pending patient's transportation or home care after procedure. Patient has standing lab orders for PT/INR. Patient verbalized understanding of dosing directions and information discussed. Progress note sent to referring office. Patient acknowledges working in consult agreement with pharmacist as referred by his/her physician.      Patient accepted that for purposes of billing, this is a virtual visit with your provider for which we will submit a claim for reimbursement with your insurance company. You may be responsible for any copays, coinsurance amounts or other amounts not covered by your insurance company.      Electronically signed by Giovanny Bardales [facility] on 11/17/20 at 8:15 PM EST    CLINICAL PHARMACY CONSULT: MED RECONCILIATION/REVIEW Loretta  22. Tracking Only    PHSO: No  Total # of Interventions Recommended: 0    - Maintenance Safety Lab Monitoring #: 1  Total Interventions Accepted: 0  Time Spent (min): 15    Giovanny Bardales, TeddyD

## 2020-11-23 ENCOUNTER — TELEPHONE (OUTPATIENT)
Dept: PHARMACY | Age: 60
End: 2020-11-23

## 2020-11-23 NOTE — TELEPHONE ENCOUNTER
aDne Felix with Dr. Danae Fishman called to report his knee replacement surgery was rescheduled for 12/15. Called patient and he is very unhappy surgery was cancelled. Patient states he took 3.75mg of warfarin on Friday 11/20 even though he had been advised by surgeon to hold for 4 days prior. Advised patient to restart warfarin today and take 15mg today 11/23 then 10mg on 11/24. Patient states \"I know what I am going to take, but you can tell me what you think. \"   Advised patient that plan to restart after held doses is different if surgery is not taking place. Patient received other call and had to end call quickly. Will call patient in 1 week.      CLINICAL PHARMACY CONSULT: MED RECONCILIATION/REVIEW ADDENDUM    For Pharmacy Admin Tracking Only    PHSO: No  Total # of Interventions Recommended: 0  - Increased Dose #: 1  Total Interventions Accepted: 0  Time Spent (min): 15    Briseyda Way PharmD

## 2020-11-23 NOTE — TELEPHONE ENCOUNTER
Left voicemail on nurse line for Dr. Em Junior that patient is to take warfarin 10mg daily for 2 days when warfarin is restarted.    CLINICAL PHARMACY CONSULT: MED RECONCILIATION/REVIEW ADDENDUM    For Pharmacy Admin Tracking Only    PHSO: No  Total # of Interventions Recommended: 0    Total Interventions Accepted: 0  Time Spent (min): 5    Teddy SandovalD

## 2020-11-27 ENCOUNTER — TELEPHONE (OUTPATIENT)
Dept: PHARMACY | Age: 60
End: 2020-11-27

## 2020-11-27 NOTE — TELEPHONE ENCOUNTER
Patient scheduled for INR at lab 12/3 after PT. Patient reports he is taking Percocet 5/325mg.      CLINICAL PHARMACY CONSULT: MED RECONCILIATION/REVIEW ADDENDUM    For Pharmacy Admin Tracking Only    PHSO: No  Total # of Interventions Recommended: 0    Total Interventions Accepted: 0  Time Spent (min): 5    Ryann Santana, TeddyD

## 2020-12-03 ENCOUNTER — ANTI-COAG VISIT (OUTPATIENT)
Dept: PHARMACY | Age: 60
End: 2020-12-03
Payer: COMMERCIAL

## 2020-12-03 ENCOUNTER — HOSPITAL ENCOUNTER (OUTPATIENT)
Dept: PHYSICAL THERAPY | Age: 60
Setting detail: THERAPIES SERIES
Discharge: HOME | End: 2020-12-03
Payer: COMMERCIAL

## 2020-12-03 ENCOUNTER — HOSPITAL ENCOUNTER (OUTPATIENT)
Age: 60
Discharge: HOME | End: 2020-12-03
Payer: COMMERCIAL

## 2020-12-03 LAB
INR BLD: 1.87 INDEX
PROTHROMBIN TIME: 21.6 SECONDS (ref 11.7–14.5)

## 2020-12-03 PROCEDURE — 85610 PROTHROMBIN TIME: CPT

## 2020-12-03 PROCEDURE — 97016 VASOPNEUMATIC DEVICE THERAPY: CPT

## 2020-12-03 PROCEDURE — 97161 PT EVAL LOW COMPLEX 20 MIN: CPT

## 2020-12-03 PROCEDURE — 36415 COLL VENOUS BLD VENIPUNCTURE: CPT

## 2020-12-03 PROCEDURE — 99211 OFF/OP EST MAY X REQ PHY/QHP: CPT

## 2020-12-03 PROCEDURE — 97110 THERAPEUTIC EXERCISES: CPT

## 2020-12-03 ASSESSMENT — PAIN DESCRIPTION - FREQUENCY: FREQUENCY: CONTINUOUS

## 2020-12-03 ASSESSMENT — PAIN DESCRIPTION - PROGRESSION: CLINICAL_PROGRESSION: GRADUALLY IMPROVING

## 2020-12-03 ASSESSMENT — PAIN - FUNCTIONAL ASSESSMENT: PAIN_FUNCTIONAL_ASSESSMENT: PREVENTS OR INTERFERES SOME ACTIVE ACTIVITIES AND ADLS

## 2020-12-03 ASSESSMENT — PAIN DESCRIPTION - LOCATION: LOCATION: KNEE

## 2020-12-03 ASSESSMENT — PAIN DESCRIPTION - PAIN TYPE: TYPE: SURGICAL PAIN

## 2020-12-03 ASSESSMENT — PAIN DESCRIPTION - ORIENTATION: ORIENTATION: RIGHT

## 2020-12-03 ASSESSMENT — PAIN SCALES - GENERAL: PAINLEVEL_OUTOF10: 7

## 2020-12-03 NOTE — PROGRESS NOTES
Physical Therapy  Initial Assessment  Date: 12/3/2020  Patient Name: Suellen Barron  MRN: [id number]  : 1960     Treatment Diagnosis: R knee stiffness/impaired mobility    Restrictions  Position Activity Restriction  Other position/activity restrictions: WBAT R    Subjective   General  Chart Reviewed: Yes  Patient assessed for rehabilitation services?: Yes  Additional Pertinent Hx: R femur orif over 20 years ago; R TSA ; L Tibia ORIF   Family / Caregiver Present: No  Referring Practitioner:  Al  Diagnosis: R TKA 20-  Follows Commands: Impaired  PT Visit Information  PT Insurance Information: AIMS need pre cert  Subjective  Subjective: R TKA 20- overnight stay [facility]-difficulty sleeping -can't get comfortable- difficulty with walking- limits prolonged standing due to pain  Pain Screening  Patient Currently in Pain: Yes  Pain Assessment  Pain Assessment: 0-10  Pain Level: 7(max pain occurs @ night)  Pain Type: Surgical pain  Pain Location: Knee  Pain Orientation: Right  Pain Frequency: Continuous  Clinical Progression: Gradually improving  Functional Pain Assessment: Prevents or interferes some active activities and ADLs  Vital Signs  Patient Currently in Pain: Yes    Vision/Hearing  Vision  Vision: (glasses)  Hearing  Hearing: Within functional limits    Orientation  Orientation  Overall Orientation Status: Within Normal Limits    Social/Functional History  Social/Functional History  Lives With: Spouse  Home Layout: One level;Performs ADL's on one level  Home Access: Stairs to enter without rails  Entrance Stairs - Number of Steps: 2  ADL Assistance: Independent  Homemaking Assistance: (wife assists)  Homemaking Responsibilities: No  Ambulation Assistance: Independent  Transfer Assistance: Independent  Active : Yes(not currently)  Mode of Transportation: Car  Occupation: Full time employment  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

## 2020-12-03 NOTE — PLAN OF CARE
Outpatient Physical Therapy           [place]           [] Phone: [phone]   Fax: [phone]  [place]           [x] Phone: [phone]   Fax: [phone]     To: Referring Practitioner: Nico Lyons    From: Mamadou Mack, PT     Patient: Crystal Stewart       : 1960  Diagnosis: Diagnosis: R TKA 20-   Treatment Diagnosis: Treatment Diagnosis: R knee stiffness/impaired mobility   Date: 12/3/2020    Physical Therapy Certification/Re-Certification Form    The following patient has been evaluated for physical therapy services and for therapy to continue, insurance requires physician review of the treatment plan initially and every 90 days. Please review the attached evaluation and/or summary of the patient's plan of care, and verify that you agree therapy should continue by signing the attached document and sending it back to our office.     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   Skilled PT interventions are intended to:increase R knee ROM/ R leg strength which will enable patient  to return to PLOF  Patient agrees with established plan of care and assisted in the development of their  goals  Barriers to learning:none- no mental/cognitive barriers observed  Preferred learning style(s):   written- demonstration -practice  Preferred Language: English  Potential barriers to progress:none  The patient appears motivated to participate in PT and regain PLOF: yes      Plan of Care/Treatment to date:  [x] Therapeutic Exercise  [] Modalities:  [x] Therapeutic Activity     [] Ultrasound  [x] Electrical Stimulation  [x] Gait Training      [] Cervical Traction [] Lumbar Traction  [x] Neuromuscular Re-education    [] Cold/hotpack [] Iontophoresis   [x] Instruction in HEP      [x] Vasopneumatic    [] Dry Needling  [x] Manual Therapy               [] Aquatic Therapy       Other:          Frequency/Duration:  # Days per week: [] 1 day # Weeks: [] 1 week [] 5 weeks     [x] 2 days   [] 2 weeks [] 6 weeks     [x] 3 days   [] 3 weeks [] 7 weeks     [] 4 days   [] 4 weeks [x] 8 weeks         [] 9 weeks [] 10 weeks         [] 11 weeks [] 12 weeks    Rehab Potential/Progress: [] Excellent [x] Good [] Fair  [] Poor     Goals:         Long term goals  Time Frame for Long term goals : 8 weeks  Long term goal 1: patient's goal: return to work  Long term goal 2: patient will have 110* of  R knee FLEX AROM in order to have minimal difficulty with rising from low surfaces and to meet work requirment of climbing  a ladde/steps  Long term goal 3: patient will lack  no more than 5* of  R knee EXT in order for patient to walk without a limp  Long term goal 4: patient will score over 21/80 on LEFS and will report no difficulty with climbing 10 steps on the LEFS which indcates improved R LE function and ready for return to work      Electronically signed by:  Ernst Rudd PT, 12/3/2020, 5:22 PM        If you have any questions or concerns, please don't hesitate to call.   Thank you for your referral.      Physician Signature:________________________________Date:_________ TIME: _____  By signing above, therapists plan is approved by physician

## 2020-12-03 NOTE — FLOWSHEET NOTE
Outpatient Physical Therapy  [place]           [x] Phone: [phone]   Fax: [phone]  [place]           [] Phone: [phone]   Fax: [phone]        Physical Therapy Daily Treatment Note  Date:  12/3/2020    Patient Name:  Marisol Austin    :  1960  MRN: [id number]  Restrictions/Precautions: Other position/activity restrictions: WBAT R  Diagnosis:   Diagnosis: R TKA 20-  Date of Injury/Surgery:   Treatment Diagnosis: Treatment Diagnosis: R knee stiffness/impaired mobility    Insurance/Certification information: PT Insurance Information: AIMS need pre cert   Referring Physician:  Referring Practitioner:  Peter Gay  Next Doctor Visit:    Plan of care signed (Y/N):    Outcome Measure: LEFS 12/80/TUG 30 sec with RW/ 18 sec to go up/down 4 steps  Visit# / total visits:  1 /  Pain level: 7/10 Max pain R knee - occurs @ night  Goals:          Long term goals  Time Frame for Long term goals : 8 weeks  Long term goal 1: patient's goal: return to work  Long term goal 2: patient will have 110* of  R knee FLEX AROM in order to have minimal difficulty with rising from low surfaces and to meet work requirment of climbing  a ladde/steps  Long term goal 3: patient will lack  no more than 5* of  R knee EXT in order for patient to walk without a limp  Long term goal 4: patient will score over 21/80 on LEFS and will report no difficulty with climbing 10 steps on the LEFS which indcates improved R LE function and ready for return to work    Summary of Evaluation: Assessment: Quick DASH   ASSESSMENT  Patient primary complaints:  R knee stiffness/impaired mobility   History of condition:R TKA 20- overnight stay 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 None        Objective:  See eval   Prior to today's treatment session, patient was screened for signs and symptoms related to COVID-19 including but not limited to verbally answering questions related to feeling ill, cough, or SOB, along with taking temperature via forehead thermometer. Patient presented with all negative signs and symptoms and had no fever >100 degrees Fahrenheit this date. Exercises: (No more than 4 columns)   Exercise/Equipment Date 12/3/20 Date Date           WARM UP         nustep Seat 12 - arms 11, load 3 x 10\"           TABLE      SAQ Small bolster 3 x ct x 10     Heel prop On FR x3'     LAQ x10 reps     Supine heel slides AROM x 5 reps              STANDING                                                     PROPRIOCEPTION                                    MODALITIES See below                     Other Therapeutic Activities/Education:        Home Exercise Program:  Continue HEP per IP PT       Manual Treatments:        Modalities:  Patient received vasocompression on their R knee  for pain and inflammation for 15 min on low pressure. Patient had negative skin reaction afterwards. Communication with other providers:        Pt tolerated  treatment without any adverse reactions or complications this date.  . Pt would continue to benefit from skilled therapy interventions to address remaining impairments, improve mobility and strength,and progress toward goal completion and prepare for d/c including finalizing HEP ;  Pain rating   5/10 R  knee  Plan for Next Session:  start heel slides with strap for AAROM      Time In / Time Out:     See fili        Timed Code/Total Treatment Minutes:  21' TE x1/47' includes eval and vaso    Next Progress Note due:        Plan of Care Interventions:  [x] Therapeutic Exercise  [] Modalities:  [x] Therapeutic Activity     [] Ultrasound  [x] Estim  [x] Gait Training      [] Cervical Traction [] Lumbar Traction  [x] Neuromuscular Re-education    [] Cold/hotpack [] Iontophoresis   [x] Instruction in HEP      [x] Vasopneumatic   [] Dry Needling    [x] Manual Therapy               [] Aquatic Therapy              Electronically signed by:  Doren Jeans, PT 12/3/2020, 5:22 PM

## 2020-12-04 NOTE — PROGRESS NOTES
Medication Management Service  [address] [phone]    Visit Date: 12/3/2020   Subjective: All appointments have been changed to telephone visits at this time due to COVID-19. Deborah Zuniga is a 62 y.o. male who is having INR checked by [employer]. INR results were sent to the clinic for anticoagulation monitoring and adjustment. Patient results called in to clinic for warfarin management due to  Indication:   factor V Leiden mutation. INR goal: of 2.0-3.0. Duration of therapy: indefinite. Patient reports the following:   Adherent with regimen  Missed or extra doses:  None   Bleeding or thromboembolic side effects:  None  Significant medication changes:  None  Significant dietary changes: None  Significant alcohol or tobacco changes: None  Significant recent illness, disease state changes, or hospitalization:  None  Upcoming surgeries or procedures:  None  Falls: None           Assessment and Plan     PT/INR performed by Lab. INR today is 1.87, therapeutic, but below goal range due to held doses for surgery. Plan: Will continue current regimen of warfarin 7.5mg daily. Recheck INR in 2 week(s) pending his PT schedule. Patient voices preference for in person INR if possible due to long wait times at registration. Advised will call him week of 12/14. Patient has standing lab orders for PT/INR. Patient verbalized understanding of dosing directions and information discussed. Progress note sent to referring office. Patient acknowledges working in consult agreement with pharmacist as referred by his/her physician. Patient accepted that for purposes of billing, this is a virtual visit with your provider for which we will submit a claim for reimbursement with your insurance company. You may be responsible for any copays, coinsurance amounts or other amounts not covered by your insurance company. Electronically signed by Mei Recinos [facility] on 12/3/20 at 7:16 PM EST    CLINICAL PHARMACY CONSULT: MED RECONCILIATION/REVIEW Loretta [address] 22. Tracking Only    PHSO: No  Total # of Interventions Recommended: 0    - Maintenance Safety Lab Monitoring #: 1    Total Interventions Accepted: 0  Time Spent (min): Gregorio Alba, PharmD

## 2020-12-07 ENCOUNTER — HOSPITAL ENCOUNTER (OUTPATIENT)
Dept: PHYSICAL THERAPY | Age: 60
Setting detail: THERAPIES SERIES
Discharge: HOME | End: 2020-12-07
Payer: COMMERCIAL

## 2020-12-07 ENCOUNTER — HOSPITAL ENCOUNTER (OUTPATIENT)
Dept: ULTRASOUND IMAGING | Age: 60
Discharge: HOME | End: 2020-12-07
Payer: COMMERCIAL

## 2020-12-07 ENCOUNTER — TELEPHONE (OUTPATIENT)
Dept: PHARMACY | Age: 60
End: 2020-12-07

## 2020-12-07 PROBLEM — D68.51 FACTOR V LEIDEN MUTATION (HCC): Status: ACTIVE | Noted: 2020-12-07

## 2020-12-07 PROCEDURE — 93971 EXTREMITY STUDY: CPT

## 2020-12-07 NOTE — TELEPHONE ENCOUNTER
Patient called and left voicemail that he has a blood clot and his doctor ordered Lovenox. Returned call to patient. Patient unsure of Lovenox dose. Left voicemail at office of Dr. Peter Gay requesting information on Lovenox being prescribed. [facility]. I recommended a treatment dose of 1mg/kg BID be given until INR checked again. John Clink will check with provider. Received call back that [facility] did not accept recommendation. Returned call to patient. Advised to have INR checked 12/8/20. CLINICAL PHARMACY CONSULT: MED RECONCILIATION/REVIEW ADDENDUM    For Pharmacy Admin Tracking Only    PHSO: No  Total # of Interventions Recommended: 0    l cost savings:    Total Interventions Accepted: 0  Time Spent (min): 30    Teddy SosaD

## 2020-12-08 ENCOUNTER — ANTI-COAG VISIT (OUTPATIENT)
Dept: PHARMACY | Age: 60
End: 2020-12-08
Payer: COMMERCIAL

## 2020-12-08 LAB
INR BLD: 1.68 INDEX
PROTHROMBIN TIME: 19.4 SECONDS (ref 11.7–14.5)

## 2020-12-08 PROCEDURE — 99213 OFFICE O/P EST LOW 20 MIN: CPT

## 2020-12-08 PROCEDURE — 85610 PROTHROMBIN TIME: CPT

## 2020-12-08 PROCEDURE — 36415 COLL VENOUS BLD VENIPUNCTURE: CPT

## 2020-12-08 NOTE — PROGRESS NOTES
Medication Management Service  [address] [phone]  [place] [phone]    Visit Date: 12/8/2020   Subjective: All appointments have been changed to telephone visits at this time due to COVID-19. Matt Sevilla is a 62 y.o. male who is having INR checked by [employer]. INR results were sent to the clinic for anticoagulation monitoring and adjustment. Patient results called in to clinic for warfarin management due to  Indication:   DVT. INR goal: of 2.0-3.0. Duration of therapy: indefinite. Patient reports the following:   Adherent with regimen  Missed or extra doses:  None   Bleeding or thromboembolic side effects:  None  Significant medication changes:  None  Significant dietary changes: None  Significant alcohol or tobacco changes: None  Significant recent illness, disease state changes, or hospitalization:  DVT  Upcoming surgeries or procedures:  None  Falls: None           Assessment and Plan     PT/INR performed by Lab. INR today is 1.68, subtherapeutic. No identifiable cause for INR decrease since 12/3. Patient denies missed doses and denies increased vitamin K. Patient had ultrasound yesterday ordered by Nicky Solis and was started on Lovenox 40mg once daily:  Small amount of nonocclusive DVT is identified within the right common    femoral vein, right proximal superficial femoral vein, and right popliteal    vein.         Three is occlusive DVT involving the right posterior tibial vein and right    greater saphenous vein. See telephone encounter from yesterday. Patient's pharmacy [facility] has only 150mg Lovenox in stock. Each syringe has 0.1 increments   Patient instructed on administration and will call with any questions. Plan: Stop Lovenox 40mg. Start treatment dose of Lovenox at 1mg/kg BID. Patient reported weight of 275 pounds (125kg) Start Lovenox 120mg every 12 hours.  Take warfarin 10mg daily for 2 days then will continue current regimen of warfarin 7.5mg daily. Recheck INR in 3 days. Patient has standing lab orders for PT/INR. Patient verbalized understanding of dosing directions and information discussed. Progress note sent to referring office. Patient acknowledges working in consult agreement with pharmacist as referred by his/her physician. Patient accepted that for purposes of billing, this is a virtual visit with your provider for which we will submit a claim for reimbursement with your insurance company. You may be responsible for any copays, coinsurance amounts or other amounts not covered by your insurance company.      Electronically signed by Raymond Carbajal, [address] [place] [address] on 12/8/20 at 3:10 PM EST    CLINICAL PHARMACY CONSULT: MED RECONCILIATION/REVIEW ADDENDUM    For Pharmacy Admin Tracking Only    PHSO: No  Total # of Interventions Recommended: 2  - New Order #: 1 New Medication Order Reason(s): Needs Additional Medication Therapy  - Maintenance Safety Lab Monitoring #: 1  Total Interventions Accepted: 2  Time Spent (min): [address] [facility] Manan, PharmD

## 2020-12-10 ENCOUNTER — TELEPHONE (OUTPATIENT)
Dept: PHARMACY | Age: 60
End: 2020-12-10

## 2020-12-11 ENCOUNTER — ANTI-COAG VISIT (OUTPATIENT)
Dept: PHARMACY | Age: 60
End: 2020-12-11
Payer: COMMERCIAL

## 2020-12-11 LAB
INR BLD: 2.24 INDEX
PROTHROMBIN TIME: 26 SECONDS (ref 11.7–14.5)

## 2020-12-11 PROCEDURE — 85610 PROTHROMBIN TIME: CPT

## 2020-12-11 PROCEDURE — 36415 COLL VENOUS BLD VENIPUNCTURE: CPT

## 2020-12-11 PROCEDURE — 99212 OFFICE O/P EST SF 10 MIN: CPT

## 2020-12-11 RX ORDER — WARFARIN SODIUM 5 MG/1
10 TABLET ORAL
COMMUNITY
End: 2020-12-30 | Stop reason: SDUPTHER

## 2020-12-11 NOTE — PROGRESS NOTES
Medication Management Service  [address] [phone]    Visit Date: 12/11/2020   Subjective: All appointments have been changed to telephone visits at this time due to COVID-19. Caryle Gaskin is a 62 y.o. male who is having INR checked by [employer]. INR results were sent to the clinic for anticoagulation monitoring and adjustment. Patient results called in to clinic for warfarin management due to  Indication:   DVT and factor V Leiden mutation. INR goal: of 2.0-3.0. Duration of therapy: indefinite. Patient reports the following:   Adherent with regimen  Missed or extra doses:  None   Bleeding or thromboembolic side effects:  None  Significant medication changes:  None  Significant dietary changes: None  Significant alcohol or tobacco changes: None  Significant recent illness, disease state changes, or hospitalization:  None  Upcoming surgeries or procedures:  None  Falls: None           Assessment and Plan     PT/INR performed by Lab. INR today is 2.24, therapeutic. Patient had Lovenox shot this AM.         Plan: Stop lovenox. Increase weekly dose ~10% to  Warfarin 7.5mg daily except 10mg on Tuesdays and Fridays. Recheck INR in 6 days. Will check hours for outreach lab in [facility]. Patient would prefer not going into hospital.    Patient has standing lab orders for PT/INR. Patient verbalized understanding of dosing directions and information discussed. Progress note sent to referring office. Patient acknowledges working in consult agreement with pharmacist as referred by his/her physician. Patient accepted that for purposes of billing, this is a virtual visit with your provider for which we will submit a claim for reimbursement with your insurance company. You may be responsible for any copays, coinsurance amounts or other amounts not covered by your insurance company.      Electronically signed by Khris Fountain [address] [facility] on 12/11/20 at 3:45 PM EST    CLINICAL PHARMACY CONSULT: MED RECONCILIATION/REVIEW ADDENDUM    For Pharmacy Admin Tracking Only    PHSO: No  Total # of Interventions Recommended: 1  - Increased Dose #: 1  - Discontinued Medication #: 1 Discontinue Reason(s): Acute Therapy Complete  - Maintenance Safety Lab Monitoring #: 1  :   Total Interventions Accepted: 1  Time Spent (min): [address] PharmD

## 2020-12-11 NOTE — TELEPHONE ENCOUNTER
CLINICAL PHARMACY CONSULT: MED RECONCILIATION/REVIEW ADDENDUM    For Pharmacy Admin Tracking Only    PHSO: No  Total # of Interventions Recommended: 0    Total Interventions Accepted: 0  Time Spent (min): 5    Benjamin Fonseca, TeddyD

## 2020-12-15 ENCOUNTER — TELEPHONE (OUTPATIENT)
Dept: PHARMACY | Age: 60
End: 2020-12-15

## 2020-12-15 NOTE — TELEPHONE ENCOUNTER
Left message with lab hours for outreach lab on [facility]. Patient returned call and left message he will go in before 10am on Thursday.      CLINICAL PHARMACY CONSULT: MED RECONCILIATION/REVIEW ADDENDUM    For Pharmacy Admin Tracking Only    PHSO: No  Total # of Interventions Recommended: 0    Total Interventions Accepted: 0  Time Spent (min): 5    Shannon Montiel, TeddyD

## 2020-12-17 ENCOUNTER — HOSPITAL ENCOUNTER (OUTPATIENT)
Dept: PHYSICAL THERAPY | Age: 60
Setting detail: THERAPIES SERIES
Discharge: HOME | End: 2020-12-17
Payer: COMMERCIAL

## 2020-12-17 ENCOUNTER — ANTI-COAG VISIT (OUTPATIENT)
Dept: PHARMACY | Age: 60
End: 2020-12-17
Payer: COMMERCIAL

## 2020-12-17 DIAGNOSIS — D68.2 FACTOR V DEFICIENCY (HCC): ICD-10-CM

## 2020-12-17 LAB
INR BLD: 2.38 (ref 0.86–1.14)
PROTHROMBIN TIME: 27.8 SEC (ref 10–13.2)

## 2020-12-17 PROCEDURE — 99211 OFF/OP EST MAY X REQ PHY/QHP: CPT

## 2020-12-17 PROCEDURE — 97110 THERAPEUTIC EXERCISES: CPT

## 2020-12-17 NOTE — PROGRESS NOTES
Medication Management Service  Robert Becker 36 [phone]  Jessica Jorge [phone]    Visit Date: 12/17/2020   Subjective: All appointments have been changed to telephone visits at this time due to COVID-19. Caryle Gaskin is a 62 y.o. male who is having INR checked by [employer]. INR results were sent to the clinic for anticoagulation monitoring and adjustment. Patient results called in to clinic for warfarin management due to  Indication:   DVT and factor V Leiden mutation. INR goal: of 2.0-3.0. Duration of therapy: indefinite. Patient reports the following:   Adherent with regimen  Missed or extra doses:  None   Bleeding or thromboembolic side effects:  None  Significant medication changes:  None  Significant dietary changes: None  Significant alcohol or tobacco changes: None  Significant recent illness, disease state changes, or hospitalization:  None  Upcoming surgeries or procedures:  None  Falls: None           Assessment and Plan     PT/INR performed by Lab. INR today is 2.38, therapeutic. Plan: Will continue current regimen of warfarin 7.5mg daily except 10mg on Tuesdays and Fridays. Recheck INR in 2 week(s). Patient has standing lab orders for PT/INR. Patient verbalized understanding of dosing directions and information discussed. Progress note sent to referring office. Patient acknowledges working in consult agreement with pharmacist as referred by his/her physician. Patient accepted that for purposes of billing, this is a virtual visit with your provider for which we will submit a claim for reimbursement with your insurance company. You may be responsible for any copays, coinsurance amounts or other amounts not covered by your insurance company.      Electronically signed by AGLDINO Trammell [facility] on 12/17/20 at 5:00 PM EST    CLINICAL PHARMACY CONSULT: MED RECONCILIATION/REVIEW ADDENDUM For Pharmacy Admin Tracking Only    PHSO: No  Total # of Interventions Recommended: 0    - Maintenance Safety Lab Monitoring #: 1    Total Interventions Accepted: 0  Time Spent (min): 15    Teddy ToddD

## 2020-12-17 NOTE — FLOWSHEET NOTE
Outpatient Physical Therapy  [place]           [x] Phone: [phone]   Fax: [phone]  [facility]           [] Phone: [phone]   Fax: [phone]        Physical Therapy Daily Treatment Note  Date:  2020    Patient Name:  Rossana Salas    :  1960  MRN: [id number]  Restrictions/Precautions: Other position/activity restrictions: WBAT R  Diagnosis:   Diagnosis: R TKA 20-  Date of Injury/Surgery:   Treatment Diagnosis: Treatment Diagnosis: R knee stiffness/impaired mobility    Insurance/Certification information: PT Insurance Information: A[employer]S need pre cert   Referring Physician:  Referring Practitioner: Javed Chow Doctor Visit:    Plan of care signed (Y/N):    Outcome Measure: LEFS /TUG 30 sec with RW/ 18 sec to go up/down 4 steps  Visit# / total visits:  2/  Pain level: 2/10 Pt reports worst pain in the last week is 4-5/10. He reports he still  Cannot sleep more than about 3 hours in bed and then he has to go out to the recliner.    Goals:          Long term goals  Time Frame for Long term goals : 8 weeks  Long term goal 1: patient's goal: return to work  Long term goal 2: patient will have 110* of  R knee FLEX AROM in order to have minimal difficulty with rising from low surfaces and to meet work requirment of climbing  a ladde/steps  Long term goal 3: patient will lack  no more than 5* of  R knee EXT in order for patient to walk without a limp  Long term goal 4: patient will score over 21/80 on LEFS and will report no difficulty with climbing 10 steps on the LEFS which indcates improved R LE function and ready for return to work    Summary of Evaluation: Assessment: Quick DASH   ASSESSMENT  Patient primary complaints:  R knee stiffness/impaired mobility   History of condition:R TKA 20- overnight stay [facility]-  Current functional limitations: difficulty sleeping -can't get comfortable- difficulty with walking- limits prolonged standing due to pain  Clinical findings: Quick DASH 12/80; AROM: R Knee Flexion 0-145: 80*R Knee Extension 0: -15*  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   Skilled PT interventions are intended to:increase R knee ROM/ R leg strength which will enable patient  to return to PLOF  Patient agrees with established plan of care and assisted in the development of their  goals  Barriers to learning:none- no mental/cognitive barriers observed  Preferred learning style(s):   written- demonstration -practice  Preferred Language: English  Potential barriers to progress:none  The patient appears motivated to participate in PT and regain PLOF: yes     Subjective:  Pt reports he has continued his HEP from the hospital. He reports his exercises     Any changes in Ambulatory Summary Sheet? None        Objective    Knee:    Right Right   Knee flexion       95 degrees AROM         102 degrees AAROM with strap   Knee extension       0 degrees                   Prior to today's treatment session, patient was screened for signs and symptoms related to COVID-19 including but not limited to verbally answering questions related to feeling ill, cough, or SOB, along with taking temperature via forehead thermometer. Patient presented with all negative signs and symptoms and had no fever >100 degrees Fahrenheit this date.          Exercises: (No more than 4 columns)   Exercise/Equipment Date 12/3/20 Date: 12/17/2020 Date           WARM UP         nustep Seat 12 - arms 11, load 3 x 10\" Seat 12 UE 11 L4 x8'          TABLE      SAQ Small bolster 3 x ct x 10 Medium bolster 2x15 with 5\" hold    Heel prop On FR x3'     LAQ x10 reps     Supine heel slides AROM x 5 reps AROM 1x10  AAROM with strap 1x10    Quad set  1x10 with 5\" hold    SLR     2x10 with 2# wt R LE    STANDING      Heel raises  1x15  B LE    marches  1x15 B LE    3 way hip  1x15 B LE    HS curls  1x15 R LE    Forward Step ups  1x10 B LE 6\" step     Step overs  1x10 4\" step PROPRIOCEPTION                                    MODALITIES See below none                  Other Therapeutic Activities/Education:  Pt to use 2 towels for a bolster at home and a belt for AAROM knee flexion as well as a bag under his foot to A with sliding     Home Exercise Program:  See above. Pt to continue current HEP    Manual Treatments:  PT performed patellar mobs medial lateral and inferior/superior with good movement. Modalities:  None. Communication with other providers:  none    Pt tolerated  treatment without any adverse reactions or complications this date.  . Pt would continue to benefit from skilled therapy interventions to address remaining impairments, improve mobility and strength,and progress toward goal completion and prepare for d/c including finalizing HEP ;  Pain rating   2/10 R  Knee    Plan for Next Session:  continue per POC    Time In / Time Out:   4:15-5:00 pm    Timed Code/Total Treatment Minutes:  45'/ 3 TE     Next Progress Note due:      Plan of Care Interventions:  [x] Therapeutic Exercise  [] Modalities:  [x] Therapeutic Activity     [] Ultrasound  [x] Estim  [x] Gait Training      [] Cervical Traction [] Lumbar Traction  [x] Neuromuscular Re-education    [] Cold/hotpack [] Iontophoresis   [x] Instruction in HEP      [x] Vasopneumatic   [] Dry Needling    [x] Manual Therapy               [] Aquatic Therapy              Electronically signed by:  Harmony Sweeney PT DPT [id number]  12/17/2020, 4:12 PM

## 2020-12-18 ENCOUNTER — HOSPITAL ENCOUNTER (OUTPATIENT)
Dept: PHYSICAL THERAPY | Age: 60
Setting detail: THERAPIES SERIES
Discharge: HOME | End: 2020-12-18
Payer: COMMERCIAL

## 2020-12-18 PROCEDURE — 97110 THERAPEUTIC EXERCISES: CPT

## 2020-12-18 PROCEDURE — 97016 VASOPNEUMATIC DEVICE THERAPY: CPT

## 2020-12-18 NOTE — FLOWSHEET NOTE
Outpatient Physical Therapy  [place]           [x] Phone: [phone]   Fax: [phone]  [facility]           [] Phone: [phone]   Fax: [phone]        Physical Therapy Daily Treatment Note  Date:  2020    Patient Name:  Caryle Gaskin    :  1960  MRN: [id number]  Restrictions/Precautions: Other position/activity restrictions: WBAT R  Diagnosis:   Diagnosis: R TKA 20-  Date of Injury/Surgery:   Treatment Diagnosis: Treatment Diagnosis: R knee stiffness/impaired mobility    Insurance/Certification information: PT Insurance Information: AIMS need pre cert   Referring Physician:  Referring Practitioner:  Kelin Torres  Next Doctor Visit:    Plan of care signed (Y/N):    Outcome Measure: LEFS 12/TUG 30 sec with RW/ 18 sec to go up/down 4 steps  Visit# / total visits:  3/  Pain level: 5/10 R knee - max pain so far today - occurred early this morning   Goals:          Long term goals  Time Frame for Long term goals : 8 weeks  Long term goal 1: patient's goal: return to work  Long term goal 2: patient will have 110* of  R knee FLEX AROM in order to have minimal difficulty with rising from low surfaces and to meet work requirment of climbing  a ladde/steps  Long term goal 3: patient will lack  no more than 5* of  R knee EXT in order for patient to walk without a limp  Long term goal 4: patient will score over 21/80 on LEFS and will report no difficulty with climbing 10 steps on the LEFS which indcates improved R LE function and ready for return to work    Summary of Evaluation: Assessment: Quick DASH   ASSESSMENT  Patient primary complaints:  R knee stiffness/impaired mobility   History of condition:R TKA 20- overnight stay [facility]MICAL DEPENDENCY [facility]-  Current functional limitations: difficulty sleeping -can't get comfortable- difficulty with walking- limits prolonged standing due to pain  Clinical findings: Quick DASH ; AROM: R Knee Flexion 0-145: 80*R Knee Extension 0: -15* 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   Skilled PT interventions are intended to:increase R knee ROM/ R leg strength which will enable patient  to return to PLOF  Patient agrees with established plan of care and assisted in the development of their  goals  Barriers to learning:none- no mental/cognitive barriers observed  Preferred learning style(s):   written- demonstration -practice  Preferred Language: English  Potential barriers to progress:none  The patient appears motivated to participate in PT and regain PLOF: yes     Subjective: Slept 6.5 hours in bed last night  Any changes in Ambulatory Summary Sheet? None        Objective    Knee:    Right Right   Knee flexion       100 degrees AROM         105 degrees AAROM with other leg   Knee extension       0 degrees                   Prior to today's treatment session, patient was screened for signs and symptoms related to COVID-19 including but not limited to verbally answering questions related to feeling ill, cough, or SOB, along with taking temperature via forehead thermometer. Patient presented with all negative signs and symptoms and had no fever >100 degrees Fahrenheit this date.          Exercises: (No more than 4 columns)   Exercise/Equipment Date 12/3/20 Date: 12/17/2020 Date 12/18/20           WARM UP         nustep Seat 12 - arms 11, load 3 x 10\" Seat 12 UE 11 L4 x8' Seat 10 UE 11 L4 x8   Bike    x10'   TABLE      SAQ Small bolster 3 x ct x 10 Medium bolster 2x15 with 5\" hold FR 5 ct x20 reps   Heel prop On FR x3'  On FR x5''   LAQ x10 reps  x10 reps   Supine heel slides AROM x 5 reps AROM 1x10  AAROM with strap 1x10 AROM 1x5    Quad set  1x10 with 5\" hold 1x10 with 5\" hold   SLR     2x10 with 2# wt R LE ----   STANDING      Heel raises  1x15  B LE 2 x10 B LE   marches  1x15 B LE 2 laps   3 way hip  1x15 B LE --   HS curls  1x15 R LE ---   Forward Step ups  1x10 B LE 6\" step  3x10 R LE 6\" step R UE support @ [facility] Step overs  1x10 4\" step 2\" step 3 x10- step over/down and back        Rocker board   FWD/BWD- lateral  1.5' ea   [facility] See below none See below                 Other Therapeutic Activities/Education:  Pt to use 2 towels for a bolster at home and a belt for AAROM knee flexion as well as a bag under his foot to A with sliding     Home Exercise Program:  See above. Pt to continue current HEP    Manual Treatments:  PT performed posterior Tibial glides  Modalities:  Patient received vasocompression on their R knee for pain and inflammation for15 min on low pressure. Patient had negative skin reaction afterwards. Communication with other providers:  none    Pt tolerated  treatment without any adverse reactions or complications this date. ; able to bike with full revolutions without difficulty.  Pt would continue to benefit from skilled therapy interventions to address remaining impairments, improve mobility and strength,and progress toward goal completion and prepare for d/c including finalizing HEP ;  Pain rating   2/10 R  Knee    Plan for Next Session:   sit to stands with arms across chest as able     Time In / Time Out:   0813/8217    Timed Code/Total Treatment Minutes: 64'/ 3 TE 1 vaso    Next Progress Note due:      Plan of Care Interventions:  [x] Therapeutic Exercise  [] Modalities:  [x] Therapeutic Activity     [] Ultrasound  [x] Estim  [x] Gait Training      [] Cervical Traction [] Lumbar Traction  [x] Neuromuscular Re-education    [] Cold/hotpack [] Iontophoresis   [x] Instruction in HEP      [x] Vasopneumatic   [] Dry Needling    [x] Manual Therapy               [] Aquatic Therapy              Electronically signed by:  Silviano Cadena PT  12/18/2020, 8:13 AM

## 2020-12-21 ENCOUNTER — HOSPITAL ENCOUNTER (OUTPATIENT)
Dept: PHYSICAL THERAPY | Age: 60
Setting detail: THERAPIES SERIES
Discharge: HOME | End: 2020-12-21
Payer: COMMERCIAL

## 2020-12-21 PROCEDURE — 97016 VASOPNEUMATIC DEVICE THERAPY: CPT

## 2020-12-21 PROCEDURE — 97110 THERAPEUTIC EXERCISES: CPT

## 2020-12-21 NOTE — FLOWSHEET NOTE
Outpatient Physical Therapy  [place]           [x] Phone: [phone]   Fax: [phone]  [place] [facility]           [] Phone: [phone]   Fax: [phone]        Physical Therapy Daily Treatment Note  Date:  2020    Patient Name:  John Heredia    :  1960  MRN: [id number]  Restrictions/Precautions: Other position/activity restrictions: WBAT R  Diagnosis:   Diagnosis: R TKA 20-  Date of Injury/Surgery:   Treatment Diagnosis: Treatment Diagnosis: R knee stiffness/impaired mobility    Insurance/Certification information: PT Insurance Information:18 visits approved 12/3/20- 21  Referring Physician:  Referring Practitioner:  Delta Yang  Next Doctor Visit:    Plan of care signed (Y/N):    Outcome Measure: LEFS /TUG 30 sec with RW/ 18 sec to go up/down 4 steps  Visit# / total visits:   Pain level: 4/10 R knee - max pain so far today - occurred early this morning   Goals:          Long term goals  Time Frame for Long term goals : 8 weeks  Long term goal 1: patient's goal: return to work  Long term goal 2: patient will have 110* of  R knee FLEX AROM in order to have minimal difficulty with rising from low surfaces and to meet work requirment of climbing  a ladde/steps  Long term goal 3: patient will lack  no more than 5* of  R knee EXT in order for patient to walk without a limp  Long term goal 4: patient will score over 21/80 on LEFS and will report no difficulty with climbing 10 steps on the LEFS which indcates improved R LE function and ready for return to work    Summary of Evaluation: Assessment: Quick DASH   ASSESSMENT  Patient primary complaints:  R knee stiffness/impaired mobility   History of condition:R TKA 20- overnight stay [facility]-  Current functional limitations: difficulty sleeping -can't get comfortable- difficulty with walking- limits prolonged standing due to pain  Clinical findings: Quick DASH ; AROM: R Knee Flexion 0-145: 80*R Knee Extension 0: -15* 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   Skilled PT interventions are intended to:increase R knee ROM/ R leg strength which will enable patient  to return to PLOF  Patient agrees with established plan of care and assisted in the development of their  goals  Barriers to learning:none- no mental/cognitive barriers observed  Preferred learning style(s):   written- demonstration -practice  Preferred Language: English  Potential barriers to progress:none  The patient appears motivated to participate in PT and regain PLOF: yes     Subjective:  Patient reports of 4/10 pain upon arrival and describes it as stiff, and feels his right foot is swollen. Any changes in Ambulatory Summary Sheet? None        Objective    Knee:    Right Right   Knee flexion               110 degrees AAROM with strap   Knee extension       0 degrees                   Prior to today's treatment session, patient was screened for signs and symptoms related to COVID-19 including but not limited to verbally answering questions related to feeling ill, cough, or SOB, along with taking temperature via forehead thermometer. Patient presented with all negative signs and symptoms and had no fever >100 degrees Fahrenheit this date.          Exercises: (No more than 4 columns)   Exercise/Equipment Date: 12/17/2020 Date 12/18/20 12/21/2020           WARM UP         nustep Seat 12 UE 11 L4 x8' Seat 10 UE 11 L4 x8 Seat 10 UE 11 L4 x7   Bike   x10' 10x   TABLE      SAQ Medium bolster 2x15 with 5\" hold FR 5 ct x20 reps FR 5 ct x20 reps   Heel prop  On FR x5'' On FR x5''   LAQ  x10 reps 10x3\"   Supine heel slides AROM 1x10  AAROM with strap 1x10 AROM 1x5  10x10\" w/strap   Quad set 1x10 with 5\" hold 1x10 with 5\" hold 10x10\"   SLR    2x10 with 2# wt R LE ----    STANDING      Heel raises 1x15  B LE 2 x10 B LE 20x BLE   marches 1x15 B LE 2 laps 2 laps   3 way hip 1x15 B LE --    HS curls 1x15 R LE --- Forward Step ups 1x10 B LE 6\" step  3x10 R LE 6\" step R UE support @ most 3x10 R LE at steps       Step overs 1x10 4\" step 2\" step 3 x10- step over/down and back 2\" step 3 x10- step over/down and back        Rocker board  FWD/BWD- lateral  1.5' ea FWD/BWD- lateral  1.5' ea   PROPRIOCEPTION      STS arms across chest   10x                           MODALITIES none See below                  Other Therapeutic Activities/Education:  Pt to use 2 towels for a bolster at home and a belt for AAROM knee flexion as well as a bag under his foot to A with sliding     Home Exercise Program:  See above. Pt to continue current HEP    Manual Treatments:  PT performed posterior Tibial glides  Modalities:  Patient received vasocompression on their R knee for pain and inflammation for15 min on low pressure. Patient had negative skin reaction afterwards. Communication with other providers:  none    Pt tolerated  treatment without any adverse reactions or complications this date. ; able to bike with full revolutions without difficulty.  Pt would continue to benefit from skilled therapy interventions to address remaining impairments, improve mobility and strength,and progress toward goal completion and prepare for d/c including finalizing HEP ;  Pain rating   2/10 R  Knee    Plan for Next Session:   sit to stands with arms across chest as able     Time In / Time Out:   0728/0836    Timed Code/Total Treatment Minutes:  68 15vaso 53te    Next Progress Note due:      Plan of Care Interventions:  [x] Therapeutic Exercise  [] Modalities:  [x] Therapeutic Activity     [] Ultrasound  [x] Estim  [x] Gait Training      [] Cervical Traction [] Lumbar Traction  [x] Neuromuscular Re-education    [] Cold/hotpack [] Iontophoresis   [x] Instruction in HEP      [x] Vasopneumatic   [] Dry Needling    [x] Manual Therapy               [] Aquatic Therapy              Electronically signed by:  Sulaiman Austin PTA/Wandy Hudson PT  12/21/2020, 7:28 AM

## 2020-12-23 ENCOUNTER — HOSPITAL ENCOUNTER (OUTPATIENT)
Dept: PHYSICAL THERAPY | Age: 60
Setting detail: THERAPIES SERIES
Discharge: HOME | End: 2020-12-23
Payer: COMMERCIAL

## 2020-12-23 PROCEDURE — 97110 THERAPEUTIC EXERCISES: CPT

## 2020-12-23 PROCEDURE — 97530 THERAPEUTIC ACTIVITIES: CPT

## 2020-12-23 PROCEDURE — 97016 VASOPNEUMATIC DEVICE THERAPY: CPT

## 2020-12-23 NOTE — FLOWSHEET NOTE
Outpatient Physical Therapy  [place]           [[place]] Phone: [phone]   Fax: [phone]  Martina Costello           [] Phone: [phone]   Fax: [phone]        Physical Therapy Daily Treatment Note  Date:  2020    Patient Name:  Aysha Brooke    :  1960  MRN: [id number]  Restrictions/Precautions: Other position/activity restrictions: WBAT R  Diagnosis:   Diagnosis: R TKA 20-  Date of Injury/Surgery:   Treatment Diagnosis: Treatment Diagnosis: R knee stiffness/impaired mobility    Insurance/Certification information: PT Insurance Information:18 visits approved 12/3/20- 21  Referring Physician:  Referring Practitioner:  Edna Campbell  Next Doctor Visit:    Plan of care signed (Y/N):    Outcome Measure: LEFS /TUG 30 sec with RW/ 18 sec to go up/down 4 steps  Visit# / total visits:   Pain level: 3/10 R knee - max pain so far today - occurred early this morning   Goals:          Long term goals  Time Frame for Long term goals : 8 weeks  Long term goal 1: patient's goal: return to work  Long term goal 2: patient will have 110* of  R knee FLEX AROM in order to have minimal difficulty with rising from low surfaces and to meet work requirment of climbing  a ladde/steps  Long term goal 3: patient will lack  no more than 5* of  R knee EXT in order for patient to walk without a limp  Long term goal 4: patient will score over 21/80 on LEFS and will report no difficulty with climbing 10 steps on the LEFS which indcates improved R LE function and ready for return to work    Summary of Evaluation: Assessment: Quick DASH   ASSESSMENT  Patient primary complaints:  R knee stiffness/impaired mobility   History of condition:R TKA 20- overnight stay [facility]-  Current functional limitations: difficulty sleeping -can't get comfortable- difficulty with walking- limits prolonged standing due to pain  Clinical findings: Quick DASH ; AROM: R Knee Flexion 0-145: 80*R Knee Extension 0: -15* 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   Skilled PT interventions are intended to:increase R knee ROM/ R leg strength which will enable patient  to return to PLOF  Patient agrees with established plan of care and assisted in the development of their  goals  Barriers to learning:none- no mental/cognitive barriers observed  Preferred learning style(s):   written- demonstration -practice  Preferred Language: English  Potential barriers to progress:none  The patient appears motivated to participate in PT and regain PLOF: yes     Subjective:  Patient reports being able to sleep in his bed comfortably most of the time- does have to get up @ times due to R knee getting uncomfortable    Any changes in Ambulatory Summary Sheet? None        Objective    Knee:    Right Right   Knee flexion             110 degrees AROM    Knee extension       0 degrees                   Prior to today's treatment session, patient was screened for signs and symptoms related to COVID-19 including but not limited to verbally answering questions related to feeling ill, cough, or SOB, along with taking temperature via forehead thermometer. Patient presented with all negative signs and symptoms and had no fever >100 degrees Fahrenheit this date.          Exercises: (No more than 4 columns)   Exercise/Equipment Date 12/18/20 12/21/2020 12/23/20           WARM UP         nustep Seat 10 UE 11 L4 x8 Seat 10 UE 11 L4 x7 seat11 load 5 x 5'- focus on knee EXT/quad set   Bike  x10' 10x 10'   TABLE      SAQ FR 5 ct x20 reps FR 5 ct x20 reps FR 5 ct x20 reps   Heel prop On FR x5'' On FR x5''    LAQ x10 reps 10x3\" 10x3\"   Supine heel slides AROM 1x5  10x10\" w/strap 10x10\" w/strap   Quad set 1x10 with 5\" hold 10x10\" 3 x10\"   STANDING      Heel raises 2 x10 B LE 20x BLE HEP   marches 2 laps 2 laps 3 laps   Forward Step ups 3x10 R LE 6\" step R UE support @ most 3x10 R LE at steps     3x10 R LE at steps Step overs 2\" step 3 x10- step over/down and back 2\" step 3 x10- step over/down and back 2\" step 3 x10- step over/down and back        Rocker board FWD/BWD- lateral  1.5' ea FWD/BWD- lateral  1.5' ea FWD/BWD- lateral  1.5' ea   PROPRIOCEPTION      STS arms across chest  10x 10 x   Mini squats // bars    5ct x 5 reps                     MODALITIES See below  See below                 Other Therapeutic Activities/Education:  Pt to use 2 towels for a bolster at home and a belt for AAROM knee flexion as well as a bag under his foot to A with sliding     Home Exercise Program:  See above. Pt to continue current HEP    Manual Treatments:  PT performed posterior Tibial glides  Modalities:  Patient received vasocompression on their R knee for pain and inflammation for15 min on low pressure. Patient had negative skin reaction afterwards. Communication with other providers:  none  Progressed strengthening and balance activities this visit with no adverse reactions noted throughout session. Pt performed  all listed interventions and demonstrated adequate to good form with no reports of pain change during exercise. Pt reported muscle strain and fatigue with  activity load.   Pain complaints after session   1/10 R  Knee    Plan for Next Session:   increase reps as able    Time In / Time Out:   0800/0908  Timed Code/Total Treatment Minutes:   15vaso 10' TA x1, 40' TE x2    Next Progress Note due:      Plan of Care Interventions:  [x] Therapeutic Exercise  [] Modalities:  [x] Therapeutic Activity     [] Ultrasound  [x] Estim  [x] Gait Training      [] Cervical Traction [] Lumbar Traction  [x] Neuromuscular Re-education    [] Cold/hotpack [] Iontophoresis   [x] Instruction in HEP      [x] Vasopneumatic   [] Dry Needling    [x] Manual Therapy               [] Aquatic Therapy              Electronically signed by:  Eliel Lala PT 12/23/2020, 8:01 AM

## 2020-12-24 ENCOUNTER — HOSPITAL ENCOUNTER (OUTPATIENT)
Dept: PHYSICAL THERAPY | Age: 60
Setting detail: THERAPIES SERIES
Discharge: HOME | End: 2020-12-24
Payer: COMMERCIAL

## 2020-12-24 PROCEDURE — 97016 VASOPNEUMATIC DEVICE THERAPY: CPT

## 2020-12-24 PROCEDURE — 97110 THERAPEUTIC EXERCISES: CPT

## 2020-12-24 NOTE — FLOWSHEET NOTE
Outpatient Physical Therapy  [place]           [x] Phone: [phone]   Fax: [phone]  Polo Melton           [] Phone: [phone]   Fax: [phone]        Physical Therapy Daily Treatment Note  Date:  2020    Patient Name:  John Heredia    :  1960  MRN: [id number]  Restrictions/Precautions: Other position/activity restrictions: WBAT R  Diagnosis:   Diagnosis: R TKA 20-  Date of Injury/Surgery:   Treatment Diagnosis: Treatment Diagnosis: R knee stiffness/impaired mobility    Insurance/Certification information: PT Insurance Information:18 visits approved 12/3/20- 21  Referring Physician:  Referring Practitioner:  Delta Yang  Next Doctor Visit:    Plan of care signed (Y/N):    Outcome Measure: LEFS /TUG 30 sec with RW/ 18 sec to go up/down 4 steps  Visit# / total visits:   Pain level: 3/10 R knee - max pain so far today - occurred early this morning   Goals:          Long term goals  Time Frame for Long term goals : 8 weeks  Long term goal 1: patient's goal: return to work  Long term goal 2: patient will have 110* of  R knee FLEX AROM in order to have minimal difficulty with rising from low surfaces and to meet work requirment of climbing  a ladde/steps  Long term goal 3: patient will lack  no more than 5* of  R knee EXT in order for patient to walk without a limp  Long term goal 4: patient will score over 21/80 on LEFS and will report no difficulty with climbing 10 steps on the LEFS which indcates improved R LE function and ready for return to work    Summary of Evaluation: Assessment: Quick DASH   ASSESSMENT  Patient primary complaints:  R knee stiffness/impaired mobility   History of condition:R TKA 20- overnight stay [facility]-  Current functional limitations: difficulty sleeping -can't get comfortable- difficulty with walking- limits prolonged standing due to pain  Clinical findings: Quick DASH ; AROM: R Knee Flexion 0-145: 80*R Knee Extension 0:

## 2020-12-28 ENCOUNTER — HOSPITAL ENCOUNTER (OUTPATIENT)
Dept: PHYSICAL THERAPY | Age: 60
Setting detail: THERAPIES SERIES
Discharge: HOME | End: 2020-12-28
Payer: COMMERCIAL

## 2020-12-28 PROCEDURE — 97110 THERAPEUTIC EXERCISES: CPT

## 2020-12-28 PROCEDURE — 97016 VASOPNEUMATIC DEVICE THERAPY: CPT

## 2020-12-28 NOTE — FLOWSHEET NOTE
Outpatient Physical Therapy  [place]           [x] Phone: [phone]   Fax: [phone]  [facility]           [] Phone: [phone]   Fax: [phone]        Physical Therapy Daily Treatment Note  Date:  2020    Patient Name:  Nicholas Rahman    :  1960  MRN: [id number]  Restrictions/Precautions: Other position/activity restrictions: WBAT R  Diagnosis:   Diagnosis: R TKA 20-  Date of Injury/Surgery:   Treatment Diagnosis: Treatment Diagnosis: R knee stiffness/impaired mobility    Insurance/Certification information: PT Insurance Information:18 visits approved 12/3/20- 21  Referring Physician:  Referring Practitioner:  Jemima Chow Doctor Visit:    Plan of care signed (Y/N):    Outcome Measure: LEFS /TUG 30 sec with RW/ 18 sec to go up/down 4 steps  Visit# / total visits:   Pain level: 2/10 R knee - max pain so far today - occurred early this morning   Goals:          Long term goals  Time Frame for Long term goals : 8 weeks  Long term goal 1: patient's goal: return to work  Long term goal 2: patient will have 110* of  R knee FLEX AROM in order to have minimal difficulty with rising from low surfaces and to meet work requirment of climbing  a ladde/steps  Long term goal 3: patient will lack  no more than 5* of  R knee EXT in order for patient to walk without a limp  Long term goal 4: patient will score over 21/80 on LEFS and will report no difficulty with climbing 10 steps on the LEFS which indcates improved R LE function and ready for return to work    Summary of Evaluation: Assessment: Quick DASH   ASSESSMENT  Patient primary complaints:  R knee stiffness/impaired mobility   History of condition:R TKA 20- overnight stay [facility]-  Current functional limitations: difficulty sleeping -can't get comfortable- difficulty with walking- limits prolonged standing due to pain  Clinical findings: Quick DASH ; AROM: R Knee Flexion 0-145: 80*R Knee Extension 0: -15*  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   Skilled PT interventions are intended to:increase R knee ROM/ R leg strength which will enable patient  to return to PLOF  Patient agrees with established plan of care and assisted in the development of their  goals  Barriers to learning:none- no mental/cognitive barriers observed  Preferred learning style(s):   written- demonstration -practice  Preferred Language: English  Potential barriers to progress:none  The patient appears motivated to participate in PT and regain PLOF: yes     Subjective:  Patient reports of 2/10 pain upon arrival and reports the discomfort is in the medial aspect of the knee. Any changes in Ambulatory Summary Sheet? None        Objective    Knee:    Right Right   Knee flexion             110 degrees AROM    Knee extension       0 degrees                   Prior to today's treatment session, patient was screened for signs and symptoms related to COVID-19 including but not limited to verbally answering questions related to feeling ill, cough, or SOB, along with taking temperature via forehead thermometer. Patient presented with all negative signs and symptoms and had no fever >100 degrees Fahrenheit this date.          Exercises: (No more than 4 columns)   Exercise/Equipment 12/23/20 12/24/2020 12/28/2020           WARM UP         nustep seat11 load 5 x 5'- focus on knee EXT/quad set seat11 load 5 x 7'- focus on knee EXT/quad set seat11 load 5 x 7'- focus on knee EXT/quad set   Bike  10' 10' 10'   TABLE      SAQ FR 5 ct x20 reps FR 20x 5\" FR 20x 5\"   Heel prop      LAQ 10x3\" 15x3\" 15x3\"   Supine heel slides 10x10\" w/strap 10x10\" w/strap 10x10\"   Quad set 3 x10\" 20x10\" 10x10\"   STANDING      marches 3 laps 3 laps 3 laps   Forward Step ups 3x10 R LE at steps    3x10 R LE at steps 3x10 R LE at steps   Step overs 2\" step 3 x10- step over/down and back 2\" step 3 x10- step over/down and back 2\" step 3 x10- step over/down and back        Rocker board FWD/BWD- lateral  1.5' ea FWD/BWD- lateral  1.5' ea FWD/BWD- lateral  1.5' ea   PROPRIOCEPTION      STS arms across chest 10 x 10x 10x   Mini squats // bars  5ct x 5 reps 5x 5\" 10x5\"                     MODALITIES See below  See below                 Other Therapeutic Activities/Education:  Pt to use 2 towels for a bolster at home and a belt for AAROM knee flexion as well as a bag under his foot to A with sliding     Home Exercise Program:  See above. Pt to continue current HEP    Manual Treatments:      Modalities:  Patient received vasocompression on their R knee for pain and inflammation for15 min on low pressure. Patient had negative skin reaction afterwards. Communication with other providers:  None      no adverse reactions noted throughout session. Pt performed  all listed interventions and demonstrated adequate to good form with no reports of pain change during exercise. Pt reported muscle strain and fatigue with  activity load.   Pain complaints after session  2/10 R  Knee    Plan for Next Session:   increase reps as able    Time In / Time Out:    0728/0835     Timed Code/Total Treatment Minutes:  79 15vaso 52te    Next Progress Note due:      Plan of Care Interventions:  [x] Therapeutic Exercise  [] Modalities:  [x] Therapeutic Activity     [] Ultrasound  [x] Estim  [x] Gait Training      [] Cervical Traction [] Lumbar Traction  [x] Neuromuscular Re-education    [] Cold/hotpack [] Iontophoresis   [x] Instruction in HEP      [x] Vasopneumatic   [] Dry Needling    [x] Manual Therapy               [] Aquatic Therapy              Electronically signed by:  Frank Ruth PTA 12/28/2020, 7:34 AM

## 2020-12-30 ENCOUNTER — ANTI-COAG VISIT (OUTPATIENT)
Dept: PHARMACY | Age: 60
End: 2020-12-30
Payer: COMMERCIAL

## 2020-12-30 ENCOUNTER — HOSPITAL ENCOUNTER (OUTPATIENT)
Dept: PHYSICAL THERAPY | Age: 60
Setting detail: THERAPIES SERIES
Discharge: HOME | End: 2020-12-30
Payer: COMMERCIAL

## 2020-12-30 DIAGNOSIS — D68.2 FACTOR V DEFICIENCY (HCC): ICD-10-CM

## 2020-12-30 PROCEDURE — 99212 OFFICE O/P EST SF 10 MIN: CPT

## 2020-12-30 PROCEDURE — 97110 THERAPEUTIC EXERCISES: CPT

## 2020-12-30 PROCEDURE — 97016 VASOPNEUMATIC DEVICE THERAPY: CPT

## 2020-12-30 RX ORDER — WARFARIN SODIUM 5 MG/1
10 TABLET ORAL DAILY
Qty: 52 TABLET | Refills: 1 | OUTPATIENT
Start: 2020-12-30 | End: 2021-11-01

## 2020-12-30 NOTE — FLOWSHEET NOTE
Outpatient Physical Therapy  [place]           [x] Phone: [phone]   Fax: [phone]  Cadence parekh           [] Phone: [phone]   Fax: [phone]        Physical Therapy Daily Treatment Note  Date:  2020    Patient Name:  Noel Camara    :  1960  MRN: [id number]  Restrictions/Precautions: Other position/activity restrictions: WBAT R  Diagnosis:   Diagnosis: R TKA 20-  Date of Injury/Surgery:   Treatment Diagnosis: Treatment Diagnosis: R knee stiffness/impaired mobility    Insurance/Certification information: PT Insurance Information:18 visits approved 12/3/20- 21  Referring Physician:  Referring Practitioner:  Abby Chow Doctor Visit:    Plan of care signed (Y/N):    Outcome Measure: LEFS /TUG 30 sec with RW/ 18 sec to go up/down 4 steps  Visit# / total visits:   Pain level: 2/10 R knee   Goals:          Long term goals  Time Frame for Long term goals : 8 weeks  Long term goal 1: patient's goal: return to work  Long term goal 2: patient will have 110* of  R knee FLEX AROM in order to have minimal difficulty with rising from low surfaces and to meet work requirment of climbing  a ladde/steps  Long term goal 3: patient will lack  no more than 5* of  R knee EXT in order for patient to walk without a limp  Long term goal 4: patient will score over 21/80 on LEFS and will report no difficulty with climbing 10 steps on the LEFS which indcates improved R LE function and ready for return to work    Summary of Evaluation: Assessment: Quick DASH   ASSESSMENT  Patient primary complaints:  R knee stiffness/impaired mobility   History of condition:R TKA 20- overnight stay [facility]-  Current functional limitations: difficulty sleeping -can't get comfortable- difficulty with walking- limits prolonged standing due to pain  Clinical findings: Quick DASH ; AROM: R Knee Flexion 0-145: 80*R Knee Extension 0: -15* 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   Skilled PT interventions are intended to:increase R knee ROM/ R leg strength which will enable patient  to return to PLOF  Patient agrees with established plan of care and assisted in the development of their  goals  Barriers to learning:none- no mental/cognitive barriers observed  Preferred learning style(s):   written- demonstration -practice  Preferred Language: English  Potential barriers to progress:none  The patient appears motivated to participate in PT and regain PLOF: yes     Subjective:  Patient rates his knee pain 2/10 today. Any changes in Ambulatory Summary Sheet? None        Objective    Knee:    Right Right   Knee flexion             111 degrees AROM    Knee extension       0 degrees                   Prior to today's treatment session, patient was screened for signs and symptoms related to COVID-19 including but not limited to verbally answering questions related to feeling ill, cough, or SOB, along with taking temperature via forehead thermometer. Patient presented with all negative signs and symptoms and had no fever >100 degrees Fahrenheit this date.          Exercises: (No more than 4 columns)   Exercise/Equipment 12/24/2020 12/28/2020 12/30/2020           WARM UP         nustep seat11 load 5 x 7'- focus on knee EXT/quad set seat11 load 5 x 7'- focus on knee EXT/quad set seat11 load 5 x 7'- focus on knee EXT/quad set   Bike  10' 10' 10 min    TABLE      SAQ FR 20x 5\" FR 20x 5\"    Heel prop      LAQ 15x3\" 15x3\" 15x3\"   Supine heel slides 10x10\" w/strap 10x10\" 10x10\"   Quad set 20x10\" 10x10\" 10x10\"   STANDING      marches 3 laps 3 laps 3 laps   Forward Step ups 3x10 R LE at steps 3x10 R LE at steps 3x10 R LE at steps   Step overs 2\" step 3 x10- step over/down and back 2\" step 3 x10- step over/down and back 4\" step 3 x10- step over/down and back Rocker board FWD/BWD- lateral  1.5' ea FWD/BWD- lateral  1.5' ea FWD/BWD- lateral  1.5' ea   PROPRIOCEPTION      STS arms across chest 10x 10x 10x   Mini squats // bars 5x 5\" 10x5\" 10x5\"                     MODALITIES  See below See below                 Other Therapeutic Activities/Education:  Pt to use 2 towels for a bolster at home and a belt for AAROM knee flexion as well as a bag under his foot to A with sliding     Home Exercise Program:  See above. Pt to continue current HEP    Manual Treatments:      Modalities:  Patient received vasocompression on their R knee for pain and inflammation for15 min on low pressure. Patient had negative skin reaction afterwards. Communication with other providers:  None      Assessment: Patient continued to rate his knee pain 2/10 after treatment. Gained 1* of knee flexion since last visit.      Plan for Next Session:   increase reps as able    Time In / Time Out:   0800/0900  Timed Code/Total Treatment Minutes:  60 15vaso 45te    Next Progress Note due:      Plan of Care Interventions:  [x] Therapeutic Exercise  [] Modalities:  [x] Therapeutic Activity     [] Ultrasound  [x] Estim  [x] Gait Training      [] Cervical Traction [] Lumbar Traction  [x] Neuromuscular Re-education    [] Cold/hotpack [] Iontophoresis   [x] Instruction in HEP      [x] Vasopneumatic   [] Dry Needling    [x] Manual Therapy               [] Aquatic Therapy              Electronically signed by:  Sean Tabares PTA 12/30/2020, 7:59 AM

## 2020-12-30 NOTE — PROGRESS NOTES
Medication Management Service  [facility] [address] [phone]    Visit Date: 12/30/2020   Subjective: All appointments have been changed to telephone visits at this time due to COVID-19. Ashvin Jane is a 62 y.o. male who is having INR checked by [facility]. INR results were sent to the clinic for anticoagulation monitoring and adjustment. Patient results called in to clinic for warfarin management due to  Indication:   DVT and factor V Leiden mutation. INR goal: of 2.0-3.0. Duration of therapy: indefinite. Patient reports the following:   Adherent with regimen  Missed or extra doses:  None   Bleeding or thromboembolic side effects:  None  Significant medication changes:  None  Significant dietary changes: None  Significant alcohol or tobacco changes: None  Significant recent illness, disease state changes, or hospitalization:  None  Upcoming surgeries or procedures:  None  Falls: None           Assessment and Plan     PT/INR performed by Lab. INR today is 2.98, therapeutic. Refill requested on 5mg tabs. Plan: Will continue current regimen of warfarin 7.5mg daily except 10mg on Tuesdays and Fridays. Refill called to Shirley German with [facility]. Recheck INR in 4 week(s). Patient to go to [facility] lab 1/28. Patient plans to go before 10am, but scheduled for 1/28 instead of 1/29 in case results are not until next day    Patient has standing lab orders for PT/INR. Patient verbalized understanding of dosing directions and information discussed. Progress note sent to referring office. Patient acknowledges working in consult agreement with pharmacist as referred by his/her physician. Patient accepted that for purposes of billing, this is a virtual visit with your provider for which we will submit a claim for reimbursement with your insurance company. You may be responsible for any copays, coinsurance amounts or other amounts not covered by your insurance company.      Electronically signed by Raymond Carbajal, [address] [place] [address] on 12/30/20 at 12:14 PM EST    CLINICAL PHARMACY CONSULT: MED RECONCILIATION/REVIEW Loretta  22. Tracking Only    PHSO: No  Total # of Interventions Recommended: 0  - Refills Provided #: 1  - Maintenance Safety Lab Monitoring #: 1  Total Interventions Accepted: 0  Time Spent (min): 2021 Tacos Steele, PharmD

## 2020-12-31 ENCOUNTER — HOSPITAL ENCOUNTER (OUTPATIENT)
Dept: PHYSICAL THERAPY | Age: 60
Setting detail: THERAPIES SERIES
Discharge: HOME | End: 2020-12-31
Payer: COMMERCIAL

## 2020-12-31 PROCEDURE — 97016 VASOPNEUMATIC DEVICE THERAPY: CPT

## 2020-12-31 PROCEDURE — 97530 THERAPEUTIC ACTIVITIES: CPT

## 2020-12-31 PROCEDURE — 97110 THERAPEUTIC EXERCISES: CPT

## 2020-12-31 NOTE — FLOWSHEET NOTE
[facility] Therapy  [place]           [[place] Phone: [phone]   Fax: [phone]  Eric Ochoa           [] Phone: [phone]   Fax: [phone]        Physical Therapy Daily Treatment Note  Date:  2020    Patient Name:  Marsha Salcido    :  1960  MRN: [id number]  Restrictions/Precautions: Other position/activity restrictions: WBAT R  Diagnosis:   Diagnosis: R TKA 20-  Date of Injury/Surgery:   Treatment Diagnosis: Treatment Diagnosis: R knee stiffness/impaired mobility    Insurance/Certification information: PT Insurance Information:18 visits approved 12/3/20- 21  Referring Physician:  Referring Practitioner:  Scott Chow Doctor Visit:    Plan of care signed (Y/N):    Outcome Measure: LEFS /TUG 30 sec with RW/ 18 sec to go up/down 4 steps  Visit# / total visits:10/18  Pain level: 2/10 R knee   Goals:          Long term goals  Time Frame for Long term goals : 8 weeks  Long term goal 1: patient's goal: return to work  Long term goal 2: patient will have 110* of  R knee FLEX AROM in order to have minimal difficulty with rising from low surfaces and to meet work requirment of climbing  a ladde/steps  Long term goal 3: patient will lack  no more than 5* of  R knee EXT in order for patient to walk without a limp  Long term goal 4: patient will score over 21/80 on LEFS and will report no difficulty with climbing 10 steps on the LEFS which indcates improved R LE function and ready for return to work    Summary of Evaluation: Assessment: LEFS   ASSESSMENT  Patient primary complaints:  R knee stiffness/impaired mobility   History of condition:R TKA 20- overnight stay [facility]-  Current functional limitations: difficulty sleeping -can't get comfortable- difficulty with walking- limits prolonged standing due to pain  Clinical findings: Quick DASH ; AROM: R Knee Flexion 0-145: 80*R Knee Extension 0: -15* 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   Skilled PT interventions are intended to:increase R knee ROM/ R leg strength which will enable patient  to return to PLOF  Patient agrees with established plan of care and assisted in the development of their  goals  Barriers to learning:none- no mental/cognitive barriers observed  Preferred learning style(s):   written- demonstration -practice  Preferred Language: English  Potential barriers to progress:none  The patient appears motivated to participate in PT and regain PLOF: yes     Subjective:  Patient reports slept better last night than he has since TKA      Any changes in Ambulatory Summary Sheet? None        Objective TUG 8.5 10 STS in 30 sec    Knee:    Right Right   Knee flexion             106 degrees AROM    Knee extension       0 degrees                   Prior to today's treatment session, patient was screened for signs and symptoms related to COVID-19 including but not limited to verbally answering questions related to feeling ill, cough, or SOB, along with taking temperature via forehead thermometer. Patient presented with all negative signs and symptoms and had no fever >100 degrees Fahrenheit this date.          Exercises: (No more than 4 columns)   Exercise/Equipment 12/28/2020 12/30/2020 12/31/20           WARM UP         nustep seat11 load 5 x 7'- focus on knee EXT/quad set seat11 load 5 x 7'- focus on knee EXT/quad set seat11 load 7 x 7'- focus on knee EXT/quad set   Bike  10' 10 min  10'- intv setting   TABLE      SAQ FR 20x 5\"  stop   LAQ 15x3\" 15x3\" 20 x 5\"   Supine heel slides 10x10\" 10x10\" 5 x 5\"   Quad set 10x10\" 10x10\" stop   STANDING      marches 3 laps 3 laps 3 laps   Lateral step ups   2\"   2 x10   Forward Step ups 3x10 R LE at steps 3x10 R LE at steps 3x10 R LE at steps   Step overs 2\" step 3 x10- step over/down and back 4\" step 3 x10- step over/down and back 4\" step 3 x10- step over/down and back Rocker board FWD/BWD- lateral  1.5' ea FWD/BWD- lateral  1.5' ea FWD/BWD- lateral  1.5' ea   PROPRIOCEPTION      STS arms across chest 10x 10x 10x   Mini squats // bars 10x5\" 10x5\" 10x5\"                     MODALITIES See below See below                  Other Therapeutic Activities/Education:  Pt to use 2 towels for a bolster at home and a belt for AAROM knee flexion as well as a bag under his foot to A with sliding     Home Exercise Program:  See above. Pt to continue current HEP    Manual Treatments:  PT performed posterior Tibial glides    Modalities:  Patient received vasocompression on their R knee for pain and inflammation for15 min on low pressure. Patient had negative skin reaction afterwards. Communication with other providers:  None      Assessment Progressed strengthening  activities this visit with no adverse reactions noted throughout session. Pt performed  all listed interventions and demonstrated adequate to good form with no reports of pain change. Pt reported muscle strain and fatigue with increased activity load. Pain complaints after session 0/10.      Plan for Next Session:   increase reps as able    Time In / Time Out: 9463/829  Timed Code/Total Treatment Minutes: 77  15vaso 30' TE 1 TA  Next Progress Note due:      Plan of Care Interventions:  [x] Therapeutic Exercise  [] Modalities:  [x] Therapeutic Activity     [] Ultrasound  [x] Estim  [x] Gait Training      [] Cervical Traction [] Lumbar Traction  [x] Neuromuscular Re-education    [] Cold/hotpack [] Iontophoresis   [x] Instruction in HEP      [x] Vasopneumatic   [] Dry Needling    [x] Manual Therapy               [] Aquatic Therapy              Electronically signed by:  Lindsey Pryor PT 12/31/2020, 7:48 AM

## 2021-01-04 ENCOUNTER — HOSPITAL ENCOUNTER (OUTPATIENT)
Dept: PHYSICAL THERAPY | Age: 61
Setting detail: THERAPIES SERIES
Discharge: HOME | End: 2021-01-04
Payer: COMMERCIAL

## 2021-01-04 PROCEDURE — 97110 THERAPEUTIC EXERCISES: CPT

## 2021-01-04 PROCEDURE — 97530 THERAPEUTIC ACTIVITIES: CPT

## 2021-01-04 NOTE — FLOWSHEET NOTE
Patient's doctor faxed over a new order for PT. I scanned in patient's electronic chart and filed in paper chart.

## 2021-01-04 NOTE — FLOWSHEET NOTE
Out[facility] Physical Therapy  [place]           [[place]] Phone: [phone]   Fax: [phone]  [facility]           [] Phone: [phone]   Fax: [phone]        Physical Therapy Daily Treatment Note  Date:  2021    Patient Name:  Az Hernandez    :  1960  MRN: [id number]  Restrictions/Precautions: Other position/activity restrictions: WBAT R  Diagnosis:   Diagnosis: R TKA 20-  Date of Injury/Surgery:   Treatment Diagnosis: Treatment Diagnosis: R knee stiffness/impaired mobility    Insurance/Certification information: PT Insurance Information:18 visits approved 12/3/20- 21  Referring Physician:  Referring Practitioner:  Kelly Moncada  Next Doctor Visit:    Plan of care signed (Y/N):    Outcome Measure: LEFS /TUG 30 sec with RW/ 18 sec to go up/down 4 steps  Visit# / total visits:  Pain level: 2/10 R knee   Goals:          Long term goals  Time Frame for Long term goals : 8 weeks  Long term goal 1: patient's goal: return to work  Long term goal 2: patient will have 110* of  R knee FLEX AROM in order to have minimal difficulty with rising from low surfaces and to meet work requirment of climbing  a ladde/steps  Long term goal 3: patient will lack  no more than 5* of  R knee EXT in order for patient to walk without a limp  Long term goal 4: patient will score over 21/80 on LEFS and will report no difficulty with climbing 10 steps on the LEFS which indcates improved R LE function and ready for return to work    Summary of Evaluation: Assessment: LEFS   ASSESSMENT  Patient primary complaints:  R knee stiffness/impaired mobility   History of condition:R TKA 20- overnight stay [facility]-  Current functional limitations: difficulty sleeping -can't get comfortable- difficulty with walking- limits prolonged standing due to pain  Clinical findings: Quick DASH ; AROM: R Knee Flexion 0-145: 80*R Knee Extension 0: -15*  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  Skilled PT interventions are intended to:increase R knee ROM/ R leg strength which will enable patient  to return to PLOF  Patient agrees with established plan of care and assisted in the development of their  goals  Barriers to learning:none- no mental/cognitive barriers observed  Preferred learning style(s):   written- demonstration -practice  Preferred Language: English  Potential barriers to progress:none  The patient appears motivated to participate in PT and regain PLOF: yes     Subjective:  Patient reports slept better last night than he has since TKA      Any changes in Ambulatory Summary Sheet? None        Objective   Knee:    Right Right   Knee flexion             111 degrees AAROM    Knee extension       0 degrees                   Prior to today's treatment session, patient was screened for signs and symptoms related to COVID-19 including but not limited to verbally answering questions related to feeling ill, cough, or SOB, along with taking temperature via forehead thermometer. Patient presented with all negative signs and symptoms and had no fever >100 degrees Fahrenheit this date.          Exercises: (No more than 4 columns)   Exercise/Equipment 12/28/2020 12/30/2020 12/31/20           WARM UP         nustep seat11 load 5 x 7'- focus on knee EXT/quad set seat11 load 5 x 7'- focus on knee EXT/quad set seat11 load 7 x 7'- focus on knee EXT/quad set   Bike  10' 10 min  10'- intv setting   TABLE      SAQ FR 20x 5\"  stop   LAQ 15x3\" 15x3\" 20 x 5\"   Supine heel slides 10x10\" 10x10\" 5 x 5\"   Quad set 10x10\" 10x10\" stop   STANDING      marches 3 laps 3 laps 3 laps   Lateral step ups   2\"   2 x10   Forward Step ups 3x10 R LE at steps 3x10 R LE at steps 3x10 R LE at steps   Step overs 2\" step 3 x10- step over/down and back 4\" step 3 x10- step over/down and back 4\" step 3 x10- step over/down and back        Rocker board FWD/BWD- lateral  1.5' ea FWD/BWD- lateral  1.5' ea FWD/BWD- lateral  1.5' ea PROPRIOCEPTION      STS arms across chest 10x 10x 10x   Mini squats // bars 10x5\" 10x5\" 10x5\"                     MODALITIES See below See below                  Other Therapeutic Activities/Education:  Pt to use 2 towels for a bolster at home and a belt for AAROM knee flexion as well as a bag under his foot to A with sliding     Home Exercise Program:  See above. Pt to continue current HEP    Manual Treatments:  PT performed posterior Tibial glides    Modalities:      Communication with other providers:  None      Assessment Patient continued to rate his pain 2/10 after treatment. Decline vaso today stating that he would ice at home.    Plan for Next Session:   increase reps as able    Time In / Time Out: 1302/1355  Timed Code/Total Treatment Minutes: 48' ( 30' TE,  23'  TA)   Next Progress Note due:      Plan of Care Interventions:  [x] Therapeutic Exercise  [] Modalities:  [x] Therapeutic Activity     [] Ultrasound  [x] Estim  [x] Gait Training      [] Cervical Traction [] Lumbar Traction  [x] Neuromuscular Re-education    [] Cold/hotpack [] Iontophoresis   [x] Instruction in HEP      [x] Vasopneumatic   [] Dry Needling    [x] Manual Therapy               [] Aquatic Therapy              Electronically signed by:  Pierre Garcia PTA  1/4/2021, 2:17 PM

## 2021-01-06 ENCOUNTER — HOSPITAL ENCOUNTER (OUTPATIENT)
Dept: PHYSICAL THERAPY | Age: 61
Setting detail: THERAPIES SERIES
Discharge: HOME | End: 2021-01-06
Payer: COMMERCIAL

## 2021-01-06 PROCEDURE — 97110 THERAPEUTIC EXERCISES: CPT

## 2021-01-06 PROCEDURE — 97016 VASOPNEUMATIC DEVICE THERAPY: CPT

## 2021-01-06 NOTE — FLOWSHEET NOTE
Outpatient Physical Therapy  [place]           [x] Phone: [phone]   Fax: [phone]  [facility]qu[facility]           [] Phone: [phone]   Fax: [phone]        Physical Therapy Daily Treatment Note  Date:  2021    Patient Name:  Dorian Ronquillo    :  1960  MRN: [id number]  Restrictions/Precautions: Other position/activity restrictions: WBAT R  Diagnosis:   Diagnosis: R TKA 20-  Date of Injury/Surgery:   Treatment Diagnosis: Treatment Diagnosis: R knee stiffness/impaired mobility    Insurance/Certification information: PT Insurance Information:18 visits approved 12/3/20- 21  Referring Physician:  Referring Practitioner:  Cecil Chow Doctor Visit:    Plan of care signed (Y/N):    Outcome Measure: LEFS /TUG 30 sec with RW/ 18 sec to go up/down 4 steps  Visit# / total visits:   Pain level: 2/10 R knee   Goals:          Long term goals  Time Frame for Long term goals : 8 weeks  Long term goal 1: patient's goal: return to work  Long term goal 2: patient will have 110* of  R knee FLEX AROM in order to have minimal difficulty with rising from low surfaces and to meet work requirment of climbing  a ladde/steps  Long term goal 3: patient will lack  no more than 5* of  R knee EXT in order for patient to walk without a limp  Long term goal 4: patient will score over 21/80 on LEFS and will report no difficulty with climbing 10 steps on the LEFS which indcates improved R LE function and ready for return to work    Summary of Evaluation: Assessment: LEFS   ASSESSMENT  Patient primary complaints:  R knee stiffness/impaired mobility   History of condition:R TKA 20- overnight stay [facility]-  Current functional limitations: difficulty sleeping -can't get comfortable- difficulty with walking- limits prolonged standing due to pain  Clinical findings: Quick DASH ; AROM: R Knee Flexion 0-145: 80*R Knee Extension 0: -15*  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 Any changes in Ambulatory Summary Sheet? None        Objective   Knee:    Right Right   Knee flexion             114 degrees AAROM    Knee extension       0 degrees                   Prior to today's treatment session, patient was screened for signs and symptoms related to COVID-19 including but not limited to verbally answering questions related to feeling ill, cough, or SOB, along with taking temperature via forehead thermometer. Patient presented with all negative signs and symptoms and had no fever >100 degrees Fahrenheit this date.          Exercises: (No more than 4 columns)   Exercise/Equipment 12/31/20 1/6/2021          WARM UP        nustep seat11 load 7 x 7'- focus on knee EXT/quad set S11  Lv7  7'   Bike  10'- intv setting 10'    TABLE     LAQ 20 x 5\" 20x5\"   Supine heel slides 5 x 5\" 10x5\"   STANDING     marches 3 laps 3 laps   Lateral step ups 2\"   2 x10 4\" 2x10   Forward Step ups 3x10 R LE at steps 3x10 @ steps   Step overs 4\" step 3 x10- step over/down and back 4\" 3x10         Rocker board FWD/BWD- lateral  1.5' ea Fwd/Lat  1.5' ea way   PROPRIOCEPTION     STS arms across chest 10x 10x   Mini squats // bars 10x5\" 15x5\"                  MODALITIES                 Other Therapeutic Activities/Education:  Pt to use 2 towels for a bolster at home and a belt for AAROM knee flexion as well as a bag under his foot to A with sliding Home Exercise Program:  See above. Pt to continue current HEP    Manual Treatments:     Modalities:  Patient received vasocompression on their R knee for pain and inflammation for15 min on low pressure. Patient had negative skin reaction afterwards. Communication with other providers:  None      Assessment Patient continued to rate his pain 2/10 after treatment.          Plan for Next Session:   increase reps as able    Time In / Time Out:  0845/0955    Timed Code/Total Treatment Minutes:  70  55te 15vaso    Next Progress Note due:      Plan of Care Interventions:  [x] Therapeutic Exercise  [] Modalities:  [x] Therapeutic Activity     [] Ultrasound  [x] Estim  [x] Gait Training      [] Cervical Traction [] Lumbar Traction  [x] Neuromuscular Re-education    [] Cold/hotpack [] Iontophoresis   [x] Instruction in HEP      [x] Vasopneumatic   [] Dry Needling    [x] Manual Therapy               [] Aquatic Therapy              Electronically signed by:  Donna Lopez PTA  1/6/2021, 8:45 AM

## 2021-01-08 ENCOUNTER — HOSPITAL ENCOUNTER (OUTPATIENT)
Dept: PHYSICAL THERAPY | Age: 61
Setting detail: THERAPIES SERIES
Discharge: HOME | End: 2021-01-08
Payer: COMMERCIAL

## 2021-01-08 PROCEDURE — 97110 THERAPEUTIC EXERCISES: CPT

## 2021-01-08 PROCEDURE — 97016 VASOPNEUMATIC DEVICE THERAPY: CPT

## 2021-01-08 NOTE — FLOWSHEET NOTE
Spoke with the nurse at [facility] [employer] (Eric Varghese) and she states that due to Concha Zhang all therapy approvals are good for 6 months. She informed me the end date for [facility]'s approval is now 06/03/2021.

## 2021-01-08 NOTE — FLOWSHEET NOTE
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 Any changes in Ambulatory Summary Sheet? None        Objective   Knee:    Right Right   Knee flexion             117 degrees AAROM    Knee extension       0 degrees                   Prior to today's treatment session, patient was screened for signs and symptoms related to COVID-19 including but not limited to verbally answering questions related to feeling ill, cough, or SOB, along with taking temperature via forehead thermometer. Patient presented with all negative signs and symptoms and had no fever >100 degrees Fahrenheit this date.          Exercises: (No more than 4 columns)   Exercise/Equipment 1/6/2021 1/8/2021          WARM UP        nustep S11  Lv7  7' S11  Lv7  7'   Bike  10'  10'   TABLE     LAQ 20x5\" 20x5\"   Supine heel slides 10x5\" 10x5\"   STANDING     marches 3 laps 3 laps   Lateral step ups 4\" 2x10 2x10 @ steps   Forward Step ups 3x10 @ steps 3x10 @ steps   Step overs 4\" 3x10  4\" 3x10        Rocker board Fwd/Lat  1.5' ea way Fwd/Lat  1.5' ea way   PROPRIOCEPTION     STS arms across chest 10x 2x10   Mini squats // bars 15x5\" 15x5\"                  MODALITIES                 Other Therapeutic Activities/Education:  Pt to use 2 towels for a bolster at home and a belt for AAROM knee flexion as well as a bag under his foot to A with sliding Home Exercise Program:  See above. Pt to continue current HEP    Manual Treatments:     Modalities:  Patient received vasocompression on their R knee for pain and inflammation for15 min on low pressure. Patient had negative skin reaction afterwards. Communication with other providers:  None      Assessment Patient continued to rate his pain 2/10 after treatment.          Plan for Next Session:   increase reps as able    Time In / Time Out:  3415/0999    Timed Code/Total Treatment Minutes:  68  15vaso 53te    Next Progress Note due:      Plan of Care Interventions:  [x] Therapeutic Exercise  [] Modalities:  [x] Therapeutic Activity     [] Ultrasound  [x] Estim  [x] Gait Training      [] Cervical Traction [] Lumbar Traction  [x] Neuromuscular Re-education    [] Cold/hotpack [] Iontophoresis   [x] Instruction in HEP      [x] Vasopneumatic   [] Dry Needling    [x] Manual Therapy               [] Aquatic Therapy              Electronically signed by:  Guzman Herrera, RANI  1/8/2021, 7:58 AM

## 2021-01-11 ENCOUNTER — HOSPITAL ENCOUNTER (OUTPATIENT)
Dept: PHYSICAL THERAPY | Age: 61
Setting detail: THERAPIES SERIES
Discharge: HOME | End: 2021-01-11
Payer: COMMERCIAL

## 2021-01-11 PROCEDURE — 97530 THERAPEUTIC ACTIVITIES: CPT

## 2021-01-11 PROCEDURE — 97110 THERAPEUTIC EXERCISES: CPT

## 2021-01-11 PROCEDURE — 97016 VASOPNEUMATIC DEVICE THERAPY: CPT

## 2021-01-11 NOTE — FLOWSHEET NOTE
Out[place] Physical Therapy  [place]           [[place] Phone: [phone]   Fax: [phone]  [place]           [] Phone: [phone]   Fax: [phone]        Physical Therapy Daily Treatment Note  Date:  2021    Patient Name:  Bharath Lopez    :  1960  MRN: [id number]  Restrictions/Precautions: Other position/activity restrictions: WBAT R  Diagnosis:   Diagnosis: R TKA 20-  Date of Injury/Surgery:   Treatment Diagnosis: Treatment Diagnosis: R knee stiffness/impaired mobility    Insurance/Certification information: PT Insurance Information:18 visits approved 12/3/20- 21  Referring Physician:  Referring Practitioner:  Zara Chow Doctor Visit:    Plan of care signed (Y/N):    Outcome Measure: LEFS /TUG 30 sec with RW/ 18 sec to go up/down 4 steps  Visit# / total visits:   Pain level: 2/10 R knee   Goals:          Long term goals  Time Frame for Long term goals : 8 weeks  Long term goal 1: patient's goal: return to work  Long term goal 2: patient will have 110* of  R knee FLEX AROM in order to have minimal difficulty with rising from low surfaces and to meet work requirment of climbing  a ladde/steps  Long term goal 3: patient will lack  no more than 5* of  R knee EXT in order for patient to walk without a limp  Long term goal 4: patient will score over 21/80 on LEFS and will report no difficulty with climbing 10 steps on the LEFS which indcates improved R LE function and ready for return to work    Summary of Evaluation: Assessment: LEFS   ASSESSMENT  Patient primary complaints:  R knee stiffness/impaired mobility   History of condition:R TKA 20- overnight stay [facility]-  Current functional limitations: difficulty sleeping -can't get comfortable- difficulty with walking- limits prolonged standing due to pain  Clinical findings: Quick DASH ; AROM: R Knee Flexion 0-145: 80*R Knee Extension 0: -15* 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   Skilled PT interventions are intended to:increase R knee ROM/ R leg strength which will enable patient  to return to PLOF  Patient agrees with established plan of care and assisted in the development of their  goals  Barriers to learning:none- no mental/cognitive barriers observed  Preferred learning style(s):   written- demonstration -practice  Preferred Language: English  Potential barriers to progress:none  The patient appears motivated to participate in PT and regain PLOF: yes     Subjective:  Patient reports standing tolerance limited to 10'    Any changes in Ambulatory Summary Sheet? None        Objective   Knee:    Right Right   Knee flexion             105 degrees AROM    Knee extension       0 degrees                   Prior to today's treatment session, patient was screened for signs and symptoms related to COVID-19 including but not limited to verbally answering questions related to feeling ill, cough, or SOB, along with taking temperature via forehead thermometer. Patient presented with all negative signs and symptoms and had no fever >100 degrees Fahrenheit this date.          Exercises: (No more than 4 columns)   Exercise/Equipment 1/6/2021 1/8/2021 1/11/21           WARM UP         nustep S11  Lv7  7' S11  Lv7  7' S11  Lv7  7'   Bike  10'  10' 10' random setting   TABLE      LAQ 20x5\" 20x5\" 5# 10 ct x10   Supine heel slides 10x5\" 10x5\" AROM x 3 reps   STANDING      marches 3 laps 3 laps 3laps   Lateral step ups 4\" 2x10 2x10 @ steps 2 x 10 @ steps   Forward Step ups 3x10 @ steps 3x10 @ steps  with L KTC  2 x10  @ steps    Step overs 4\" 3x10  4\" 3x10 Step downs 2 x10  2\" step        Rocker board Fwd/Lat  1.5' ea way Fwd/Lat  1.5' ea way Fwd/Lat  1.5' ea way   PROPRIOCEPTION      STS arms across chest 10x 2x10 2x10   Mini squats // bars 15x5\" 15x5\" 10 x 5\"                     MODALITIES Other Therapeutic Activities/Education:  Pt to use 2 towels for a bolster at home and a belt for AAROM knee flexion as well as a bag under his foot to A with sliding     Home Exercise Program:  See above. Pt to continue current HEP    Manual Treatments:     Modalities:  Patient received vasocompression on their R knee for pain and inflammation for15 min on low pressure. Patient had negative skin reaction afterwards. Communication with other providers:  None      Assessment Progressed strengthening  activities this visit with no adverse reactions noted throughout session. Pt performed  all listed interventions and demonstrated adequate to good form with no reports of pain change. Pt reported muscle strain and fatigue with increased activity load.   Pain complaints after session 0/10      Plan for Next Session:   increase reps as able    Time In / Time Out:  0828/0939  3 TE 1 TA  1 VASO    Timed Code/Total Treatment Minutes:     Next Progress Note due:      Plan of Care Interventions:  [x] Therapeutic Exercise  [] Modalities:  [x] Therapeutic Activity     [] Ultrasound  [x] Estim  [x] Gait Training      [] Cervical Traction [] Lumbar Traction  [x] Neuromuscular Re-education    [] Cold/hotpack [] Iontophoresis   [x] Instruction in HEP      [x] Vasopneumatic   [] Dry Needling    [x] Manual Therapy               [] Aquatic Therapy              Electronically signed by:  Danae Andre, PT  1/11/2021, 8:37 AM

## 2021-01-13 ENCOUNTER — HOSPITAL ENCOUNTER (OUTPATIENT)
Dept: PHYSICAL THERAPY | Age: 61
Setting detail: THERAPIES SERIES
Discharge: HOME | End: 2021-01-13
Payer: COMMERCIAL

## 2021-01-13 PROCEDURE — 97016 VASOPNEUMATIC DEVICE THERAPY: CPT

## 2021-01-13 PROCEDURE — 97110 THERAPEUTIC EXERCISES: CPT

## 2021-01-13 NOTE — FLOWSHEET NOTE
Out[place] Physical Therapy  [place]           [[place] Phone: [phone]   Fax: [phone]  [facility]           [] Phone: [phone]   Fax: [phone]        Physical Therapy Daily Treatment Note  Date:  2021    Patient Name:  Royer Rojas    :  1960  MRN: [id number]  Restrictions/Precautions: Other position/activity restrictions: WBAT R  Diagnosis:   Diagnosis: R TKA 20-  Date of Injury/Surgery:   Treatment Diagnosis: Treatment Diagnosis: R knee stiffness/impaired mobility    Insurance/Certification information: PT Insurance Information:18 visits approved 12/3/20- 21  Referring Physician:  Referring Practitioner:  Peter Chow Doctor Visit:    Plan of care signed (Y/N):    Outcome Measure: LEFS /TUG 30 sec with RW/ 18 sec to go up/down 4 steps  Visit# / total visits:   Pain level: 2/10 R knee   Goals:          Long term goals  Time Frame for Long term goals : 8 weeks  Long term goal 1: patient's goal: return to work  Long term goal 2: patient will have 110* of  R knee FLEX AROM in order to have minimal difficulty with rising from low surfaces and to meet work requirment of climbing  a ladde/steps  Long term goal 3: patient will lack  no more than 5* of  R knee EXT in order for patient to walk without a limp  Long term goal 4: patient will score over 21/80 on LEFS and will report no difficulty with climbing 10 steps on the LEFS which indcates improved R LE function and ready for return to work    Summary of Evaluation: Assessment: LEFS   ASSESSMENT  Patient primary complaints:  R knee stiffness/impaired mobility   History of condition:R TKA 20- overnight stay [email or web address] CHEMICAL DEPENDENCY [facility]-  Current functional limitations: difficulty sleeping -can't get comfortable- difficulty with walking- limits prolonged standing due to pain  Clinical findings: Quick DASH ; AROM: R Knee Flexion 0-145: 80*R Knee Extension 0: -15*  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   Skilled PT interventions are intended to:increase R knee ROM/ R leg strength which will enable patient  to return to PLOF  Patient agrees with established plan of care and assisted in the development of their  goals  Barriers to learning:none- no mental/cognitive barriers observed  Preferred learning style(s):   written- demonstration -practice  Preferred Language: English  Potential barriers to progress:none  The patient appears motivated to participate in PT and regain PLOF: yes     Subjective:  Patient reports of 2/10 pain upon arrival and continues to c/o discomfort in the quad. Any changes in Ambulatory Summary Sheet? None        Objective   Difficulty persists with amb down steps  Knee:    Right Right   Knee flexion             115 degrees AAROM w/push from LLE   Knee extension       0 degrees                   Prior to today's treatment session, patient was screened for signs and symptoms related to COVID-19 including but not limited to verbally answering questions related to feeling ill, cough, or SOB, along with taking temperature via forehead thermometer. Patient presented with all negative signs and symptoms and had no fever >100 degrees Fahrenheit this date.          Exercises: (No more than 4 columns)   Exercise/Equipment 1/8/2021 1/11/21 1/13/2021           WARM UP         nustep S11  Lv7  7' S11  Lv7  7' S11  Lv7  7'   Bike  10' 10' random setting 10' random setting   TABLE      LAQ 20x5\" 5# 10 ct x10 5# 10 ct x10   Supine heel slides 10x5\" AROM x 3 reps Preformed    STANDING      marches 3 laps 3laps 3 laps   Lateral step ups 2x10 @ steps 2 x 10 @ steps 2 x 10 @ steps   Forward Step ups 3x10 @ steps  with L KTC  2 x10  @ steps   with L KTC  2 x10  @ steps    Step overs 4\" 3x10 Step downs 2 x10  2\" step Step downs 2 x10  2\" step        Rocker board Fwd/Lat  1.5' ea way Fwd/Lat  1.5' ea way Fwd/Lat  1.5' ea way   PROPRIOCEPTION      STS arms across chest 2x10 2x10 2x10   Mini squats // bars 15x5\" 10 x 5\" 10x5\"                     MODALITIES                    Other Therapeutic Activities/Education:  Pt to use 2 towels for a bolster at home and a belt for AAROM knee flexion as well as a bag under his foot to A with sliding     Home Exercise Program:  See above. Pt to continue current HEP    Manual Treatments:     Modalities:  Patient received vasocompression on their R knee for pain and inflammation for15 min on low pressure. Patient had negative skin reaction afterwards. Communication with other providers:  None      Assessment    Pt performed  all listed interventions and demonstrated adequate to good form with no reports of pain change. Pt reported muscle strain and fatigue with increased activity load.   Pain complaints after session 2/10      Plan for Next Session:   increase reps as able    Time In / Time Out:   7276/8488    Timed Code/Total Treatment Minutes: 65  15vaso 50te    Next Progress Note due:      Plan of Care Interventions:  [x] Therapeutic Exercise  [] Modalities:  [x] Therapeutic Activity     [] Ultrasound  [x] Estim  [x] Gait Training      [] Cervical Traction [] Lumbar Traction  [x] Neuromuscular Re-education    [] Cold/hotpack [] Iontophoresis   [x] Instruction in HEP      [x] Vasopneumatic   [] Dry Needling    [x] Manual Therapy               [] Aquatic Therapy              Electronically signed by:  Irish Camara PTA  1/13/2021, 7:57 AM

## 2021-01-15 ENCOUNTER — HOSPITAL ENCOUNTER (OUTPATIENT)
Dept: PHYSICAL THERAPY | Age: 61
Setting detail: THERAPIES SERIES
Discharge: HOME | End: 2021-01-15
Payer: COMMERCIAL

## 2021-01-15 PROCEDURE — 97016 VASOPNEUMATIC DEVICE THERAPY: CPT

## 2021-01-15 PROCEDURE — 97110 THERAPEUTIC EXERCISES: CPT

## 2021-01-15 NOTE — FLOWSHEET NOTE
interventions are intended to:increase R knee ROM/ R leg strength which will enable patient  to return to PLOF  Patient agrees with established plan of care and assisted in the development of their  goals  Barriers to learning:none- no mental/cognitive barriers observed  Preferred learning style(s):   written- demonstration -practice  Preferred Language: English  Potential barriers to progress:none  The patient appears motivated to participate in PT and regain PLOF: yes     Subjective:  Patient reports of 2/10 pain upon arrival and continues to c/o discomfort in the L ankle since his last treatment. Any changes in Ambulatory Summary Sheet? None        Objective   Difficulty persists with amb down steps  Knee:    Right Right   Knee flexion             113 degrees AAROM w/push from LLE   Knee extension       0 degrees                   Prior to today's treatment session, patient was screened for signs and symptoms related to COVID-19 including but not limited to verbally answering questions related to feeling ill, cough, or SOB, along with taking temperature via forehead thermometer. Patient presented with all negative signs and symptoms and had no fever >100 degrees Fahrenheit this date.          Exercises: (No more than 4 columns)   Exercise/Equipment 1/13/2021 1/15/2021           WARM UP        nustep S11  Lv7  7' S11 Lv7  9'   Bike  10' random setting 10' random setting   TABLE     LAQ 5# 10 ct x10 5# 10 ct x10   Supine heel slides Preformed  Performed    STANDING     marches 3 laps 3 laps   Lateral step ups 2 x 10 @ steps 2 x 10 @ steps   Forward Step ups  with L KTC  2 x10  @ steps   with L KTC  2 x10  @ steps   Step overs Step downs 2 x10  2\" step Step overs 4\" step 2x10        Rocker board Fwd/Lat  1.5' ea way Fwd/Lat  1.5' ea way   PROPRIOCEPTION     STS arms across chest 2x10 2x10   Mini squats // bars 10x5\" 10x5\"                  MODALITIES                 Other Therapeutic Activities/Education:  Pt to use 2 towels for a bolster at home and a belt for AAROM knee flexion as well as a bag under his foot to A with sliding     Home Exercise Program:  See above. Pt to continue current HEP    Manual Treatments:     Modalities:  Patient received vasocompression on their R knee for pain and inflammation for15 min on low pressure. Patient had negative skin reaction afterwards. Communication with other providers:  None      Assessment    Pt performed  all listed interventions and demonstrated adequate to good form with no reports of pain change. Pt reported muscle strain and fatigue with increased activity load.   Pain complaints after session 2/10      Plan for Next Session:   increase reps as able    Time In / Time Out:   0149/2249    Timed Code/Total Treatment Minutes:  60  15vaso 45te    Next Progress Note due:      Plan of Care Interventions:  [x] Therapeutic Exercise  [] Modalities:  [x] Therapeutic Activity     [] Ultrasound  [x] Estim  [x] Gait Training      [] Cervical Traction [] Lumbar Traction  [x] Neuromuscular Re-education    [] Cold/hotpack [] Iontophoresis   [x] Instruction in HEP      [x] Vasopneumatic   [] Dry Needling    [x] Manual Therapy               [] Aquatic Therapy              Electronically signed by:  Shraddha Odom PTA/Wandy Castellanos PT  1/15/2021, 7:59 AM

## 2021-01-18 ENCOUNTER — HOSPITAL ENCOUNTER (OUTPATIENT)
Dept: PHYSICAL THERAPY | Age: 61
Setting detail: THERAPIES SERIES
Discharge: HOME | End: 2021-01-18
Payer: COMMERCIAL

## 2021-01-18 PROCEDURE — 97016 VASOPNEUMATIC DEVICE THERAPY: CPT

## 2021-01-18 PROCEDURE — 97110 THERAPEUTIC EXERCISES: CPT

## 2021-01-18 NOTE — FLOWSHEET NOTE
Outpatient Physical Therapy  [place]           [x] Phone: [phone]   Fax: [phone]  [facility]           [] Phone: [phone]   Fax: [phone]        Physical Therapy Daily Treatment Note  Date:  2021    Patient Name:  Mary Lou Melendez    :  1960  MRN: [id number]  Restrictions/Precautions: Other position/activity restrictions: WBAT R  Diagnosis:   Diagnosis: R TKA 20-  Date of Injury/Surgery:   Treatment Diagnosis: Treatment Diagnosis: R knee stiffness/impaired mobility    Insurance/Certification information: PT Insurance Information:18 visits approved   Referring Physician:  Referring Practitioner:  Malaika Chow Doctor Visit:    Plan of care signed (Y/N):    Outcome Measure: LEFS /TUG 30 sec with RW/ 18 sec to go up/down 4 steps  Visit# / total visits:   Pain level: 2/10 R knee   Goals:          Long term goals  Time Frame for Long term goals : 8 weeks  Long term goal 1: patient's goal: return to work  Long term goal 2: patient will have 110* of  R knee FLEX AROM in order to have minimal difficulty with rising from low surfaces and to meet work requirment of climbing  a ladde/steps  Long term goal 3: patient will lack  no more than 5* of  R knee EXT in order for patient to walk without a limp  Long term goal 4: patient will score over 21/80 on LEFS and will report no difficulty with climbing 10 steps on the LEFS which indcates improved R LE function and ready for return to work    Summary of Evaluation: Assessment: LEFS   ASSESSMENT  Patient primary complaints:  R knee stiffness/impaired mobility   History of condition:R TKA 20- overnight stay [facility]-  Current functional limitations: difficulty sleeping -can't get comfortable- difficulty with walking- limits prolonged standing due to pain  Clinical findings: Quick DASH ; AROM: R Knee Flexion 0-145: 80*R Knee Extension 0: -15*  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 Any changes in Ambulatory Summary Sheet? None        Objective   Difficulty persists with amb down steps  Knee:    Right Right   Knee flexion             113 degrees AAROM w/push from LLE   Knee extension       0 degrees                   Prior to today's treatment session, patient was screened for signs and symptoms related to COVID-19 including but not limited to verbally answering questions related to feeling ill, cough, or SOB, along with taking temperature via forehead thermometer. Patient presented with all negative signs and symptoms and had no fever >100 degrees Fahrenheit this date.          Exercises: (No more than 4 columns)   Exercise/Equipment 1/13/2021 1/15/2021  1/18/2021           WARM UP         nustep S11  Lv7  7' S11 Lv7  9' S11 Lv7  9'   Bike  10' random setting 10' random setting 10' random setting   TABLE      LAQ 5# 10 ct x10 5# 10 ct x10 5# 10 ct x10   Supine heel slides Preformed  Performed  Performed    STANDING      marches 3 laps 3 laps 3 laps   Lateral step ups 2 x 10 @ steps 2 x 10 @ steps 2 x 10 @ steps   Forward Step ups  with L KTC  2 x10  @ steps   with L KTC  2 x10  @ steps  with L KTC  2 x10  @ steps   Step overs Step downs 2 x10  2\" step Step overs 4\" step 2x10 Step overs 4\" step 2x10        Rocker board Fwd/Lat  1.5' ea way Fwd/Lat  1.5' ea way Fwd/Lat  1.5' ea way   PROPRIOCEPTION STS arms across chest 2x10 2x10 2x10   Mini squats // bars 10x5\" 10x5\" 10x5\"                     MODALITIES                    Other Therapeutic Activities/Education:  Pt to use 2 towels for a bolster at home and a belt for AAROM knee flexion as well as a bag under his foot to A with sliding     Home Exercise Program:  See above. Pt to continue current HEP    Manual Treatments:     Modalities:  Patient received vasocompression on their R knee for pain and inflammation for15 min on low pressure. Patient had negative skin reaction afterwards. Communication with other providers:  None      Assessment    Pt performed  all listed interventions and demonstrated adequate to good form with no reports of pain change. Pt reported muscle strain and fatigue with increased activity load.   Pain complaints after session 2/10      Plan for Next Session:   increase reps as able    Time In / Time Out:   6604/6014    Timed Code/Total Treatment Minutes:  72  57te 15vaso    Next Progress Note due:      Plan of Care Interventions:  [x] Therapeutic Exercise  [] Modalities:  [x] Therapeutic Activity     [] Ultrasound  [x] Estim  [x] Gait Training      [] Cervical Traction [] Lumbar Traction  [x] Neuromuscular Re-education    [] Cold/hotpack [] Iontophoresis   [x] Instruction in HEP      [x] Vasopneumatic   [] Dry Needling    [x] Manual Therapy               [] Aquatic Therapy              Electronically signed by:  Thomas Landrum PTA   1/18/2021, 7:55 AM

## 2021-01-20 ENCOUNTER — HOSPITAL ENCOUNTER (OUTPATIENT)
Dept: PHYSICAL THERAPY | Age: 61
Setting detail: THERAPIES SERIES
Discharge: HOME | End: 2021-01-20
Payer: COMMERCIAL

## 2021-01-20 PROCEDURE — 97016 VASOPNEUMATIC DEVICE THERAPY: CPT

## 2021-01-20 PROCEDURE — 97110 THERAPEUTIC EXERCISES: CPT

## 2021-01-20 NOTE — FLOWSHEET NOTE
Outpatient Physical Therapy  [place]           [[place]] Phone: [phone]   Fax: [phone]  Abel Gaona           [] Phone: [phone]   Fax: [phone]        Physical Therapy Daily Treatment Note  Date:  2021    Patient Name:  Belen Briggs    :  1960  MRN: [id number]  Restrictions/Precautions: Other position/activity restrictions: WBAT R  Diagnosis:   Diagnosis: R TKA 20-  Date of Injury/Surgery:   Treatment Diagnosis: Treatment Diagnosis: R knee stiffness/impaired mobility    Insurance/Certification information: PT Insurance Information:18 visits approved   Referring Physician:  Referring Practitioner:  Nighat Chow Doctor Visit:    Plan of care signed (Y/N):    Outcome Measure: LEFS /TUG 30 sec with RW/ 18 sec to go up/down 4 steps  Visit# / total visits:   Pain level: 2/10 R knee   Goals:          Long term goals  Time Frame for Long term goals : 8 weeks  Long term goal 1: patient's goal: return to work  Long term goal 2: patient will have 110* of  R knee FLEX AROM in order to have minimal difficulty with rising from low surfaces and to meet work requirment of climbing  a ladde/steps  Long term goal 3: patient will lack  no more than 5* of  R knee EXT in order for patient to walk without a limp  Long term goal 4: patient will score over 21/80 on LEFS and will report no difficulty with climbing 10 steps on the LEFS which indcates improved R LE function and ready for return to work    Summary of Evaluation: Assessment: LEFS   ASSESSMENT  Patient primary complaints:  R knee stiffness/impaired mobility   History of condition:R TKA 20- overnight stay [facility]-  Current functional limitations: difficulty sleeping -can't get comfortable- difficulty with walking- limits prolonged standing due to pain  Clinical findings: Quick DASH ; AROM: R Knee Flexion 0-145: 80*R Knee Extension 0: -15*  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 Notes distal quad soreness. HEP compliance, completes exercises intermittent throughout the day. Notes he was able to complete steps without hand railing, but it \"very cautious\". Any changes in Ambulatory Summary Sheet? None        Objective   Difficulty persists with amb down steps. Decreased UE support during exercises. Intermittent rest breaks. Knee:    Right Right   Knee flexion             113 degrees AAROM w/push from LLE   Knee extension       0 degrees                   Prior to today's treatment session, patient was screened for signs and symptoms related to COVID-19 including but not limited to verbally answering questions related to feeling ill, cough, or SOB, along with taking temperature via forehead thermometer. Patient presented with all negative signs and symptoms and had no fever >100 degrees Fahrenheit this date.          Exercises: (No more than 4 columns)   Exercise/Equipment 1/15/2021  1/18/2021 1/20/21           WARM UP         nustep S11 Lv7  9' S11 Lv7  9' S11 Lv7  10'   Bike  10' random setting 10' random setting 10' random setting    TABLE      LAQ 5# 10 ct x10 5# 10 ct x10 5# 10 ct x10   Supine heel slides Performed  Performed  x10   STANDING      marches 3 laps 3 laps 3 laps   Lateral step ups 2 x 10 @ steps 2 x 10 @ steps 2 x 10 6\" box   Forward Step ups  with L KTC  2 x10  @ steps  with L KTC  2 x10  @ steps with L KTC  2 x10  6\" box    Step overs Step overs 4\" step 2x10 Step overs 4\" step 2x10 Step overs 4\" step 2x10        Rocker board Fwd/Lat  1.5' ea way Fwd/Lat  1.5' ea way Fwd/Lat  1.5\" ea way   PROPRIOCEPTION      STS arms across chest 2x10 2x10 2x10   Mini squats // bars 10x5\" 10x5\" 10x5\"                     MODALITIES                    Other Therapeutic Activities/Education:  Pt to use 2 towels for a bolster at home and a belt for AAROM knee flexion as well as a bag under his foot to A with sliding     Home Exercise Program:  See above. Pt to continue current HEP    Manual Treatments:     Modalities:  Patient received vasocompression on their R knee for pain and inflammation for15 min on low pressure. Patient had negative skin reaction afterwards. Communication with other providers:  None    Assessment    Pt performed  all listed interventions and demonstrated adequate to good form with no reports of pain change. Pt reported muscle strain and fatigue with increased activity load. Pt. able to perform one lap of marching without UE support while keeping good form. Demonstrated controlled eccentric R. Quad control during step overs. Pt. progressing toward goals. Pt. Requires skilled therapy for increased LE strength and mobility to perform functional activity safely. Pain complaints after session 2/10. Plan for Next Session:  add hip strengthening exercises (clamshells, sidelying hip abd.), initiate balance as tolerated.      Time In / Time Out:   0800/0907    Timed Code/Total Treatment Minutes: 79  TE52  Vaso15    Next Progress Note due:      Plan of Care Interventions:  [x] Therapeutic Exercise  [] Modalities:  [x] Therapeutic Activity     [] Ultrasound  [x] Estim  [x] Gait Training      [] Cervical Traction [] Lumbar Traction  [x] Neuromuscular Re-education    [] Cold/hotpack [] Iontophoresis   [x] Instruction in HEP      [x] Vasopneumatic   [] Dry Needling    [x] Manual Therapy               [] Aquatic Therapy              Electronically signed by:  Gene Dave PTA/ Emanuel De La Cruz SPTA   1/20/2021, 8:00 AM

## 2021-01-22 ENCOUNTER — HOSPITAL ENCOUNTER (OUTPATIENT)
Dept: PHYSICAL THERAPY | Age: 61
Setting detail: THERAPIES SERIES
Discharge: HOME | End: 2021-01-22
Payer: COMMERCIAL

## 2021-01-22 PROCEDURE — 97530 THERAPEUTIC ACTIVITIES: CPT

## 2021-01-22 PROCEDURE — 97016 VASOPNEUMATIC DEVICE THERAPY: CPT

## 2021-01-22 PROCEDURE — 97110 THERAPEUTIC EXERCISES: CPT

## 2021-01-22 PROCEDURE — 97112 NEUROMUSCULAR REEDUCATION: CPT

## 2021-01-22 NOTE — FLOWSHEET NOTE
Outpatient Physical Therapy  [place]           [[place]] Phone: [phone]   Fax: [phone]  Cadence iglesia           [] Phone: [phone]   Fax: [phone]        Physical Therapy Daily Treatment Note  Date:  2021    Patient Name:  Belen Briggs    :  1960  MRN: [id number]  Restrictions/Precautions: Other position/activity restrictions: WBAT R  Diagnosis:   Diagnosis: R TKA 20-  Date of Injury/Surgery:   Treatment Diagnosis: Treatment Diagnosis: R knee stiffness/impaired mobility    Insurance/Certification information: PT Insurance Information:18 visits approved   Referring Physician:  Referring Practitioner:  Nighat Chow Doctor Visit:    Plan of care signed (Y/N):    Outcome Measure: LEFS /TUG 30 sec with RW/ 18 sec to go up/down 4 steps  Visit# / total visits:   Pain level: 2/10 R knee   Goals:          Long term goals  Time Frame for Long term goals : 8 weeks  Long term goal 1: patient's goal: return to work  Long term goal 2: patient will have 110* of  R knee FLEX AROM in order to have minimal difficulty with rising from low surfaces and to meet work requirment of climbing  a ladde5/steps  Long term goal 3: patient will lack  no more than 5* of  R knee EXT in order for patient to walk without a limp  Long term goal 4: patient will score over 21/80 on LEFS and will report no difficulty with climbing 10 steps on the LEFS which indcates improved R LE function and ready for return to work    Summary of Evaluation: Assessment: LEFS   ASSESSMENT  Patient primary complaints:  R knee stiffness/impaired mobility   History of condition:R TKA 20- overnight stay [facility]-  Current functional limitations: difficulty sleeping -can't get comfortable- difficulty with walking- limits prolonged standing due to pain  Clinical findings: Quick DASH ; AROM: R Knee Flexion 0-145: 80*R Knee Extension 0: -15*  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 None        Objective   6 MWT 1232 ft/ TUG 7 sec no walker/ 13 STS in 30 sec/48./80 LEFS  Knee:    Right Right   Knee flexion             105 degrees AROM    Knee extension       0 degrees                   Prior to today's treatment session, patient was screened for signs and symptoms related to COVID-19 including but not limited to verbally answering questions related to feeling ill, cough, or SOB, along with taking temperature via forehead thermometer. Patient presented with all negative signs and symptoms and had no fever >100 degrees Fahrenheit this date.          Exercises: (No more than 4 columns)   Exercise/Equipment 1/15/2021  1/18/2021 1/20/21 1/22/21            WARM UP          nustep S11 Lv7  9' S11 Lv7  9' S11 Lv7  10' S11 Lv7  10'   Bike  10' random setting 10' random setting 10' random setting  10' random setting    TABLE       LAQ 5# 10 ct x10 5# 10 ct x10 5# 10 ct x10 5# 10 ct x10   Supine heel slides Performed  Performed  x10 x2   STANDING       marches 3 laps 3 laps 3 laps 3 laps   Lateral step ups 2 x 10 @ steps 2 x 10 @ steps 2 x 10 6\" box 3 x10 4\" box- L heel tap   Forward Step ups  with L KTC  2 x10  @ steps  with L KTC  2 x10  @ steps with L KTC  2 x10  6\" box   with L KTC  2 x10  @ steps   Step overs Step overs 4\" step 2x10 Step overs 4\" step 2x10 Step overs 4\" step 2x10 Step overs 4\" step 2x10 Rocker board Fwd/Lat  1.5' ea way Fwd/Lat  1.5' ea way Fwd/Lat  1.5\" ea way Fwd/Lat  1.5\" ea way   PROPRIOCEPTION       STS arms across chest 2x10 2x10 2x10 x30 sec   Mini squats // bars 10x5\" 10x5\" 10x5\" ---                        MODALITIES                       Other Therapeutic Activities/Education:  Pt to use 2 towels for a bolster at home and a belt for AAROM knee flexion as well as a bag under his foot to A with sliding     Home Exercise Program:  See above. Pt to continue current HEP    Manual Treatments:     Modalities:  Patient received vasocompression on their R knee for pain and inflammation for15 min on low pressure. Patient had negative skin reaction afterwards. Communication with other providers:  None    Assessment    Pt performed  all listed interventions and demonstrated adequate to good form with no reports of pain change. Pt reported muscle strain and fatigue with increased activity load. Pt. able to perform one lap of marching without UE support while keeping good form. Demonstrated controlled eccentric R. Quad control during step overs. Pt. progressing toward goals. Pt. Requires skilled therapy for increased LE strength and mobility to perform functional activity safely. Pain complaints after session 2/10. Plan for Next Session:  add hip strengthening exercises (clamshells, sidelying hip abd.), initiate balance as tolerated.      Time In / Time Out:   0826/945  Timed Code/Total Treatment Minutes:30' TE x2, 15' TA x1 15 ' NRE x1/ 79' includes vaso    Next Progress Note due:      Plan of Care Interventions:  [x] Therapeutic Exercise  [] Modalities:  [x] Therapeutic Activity     [] Ultrasound  [x] Estim  [x] Gait Training      [] Cervical Traction [] Lumbar Traction  [x] Neuromuscular Re-education    [] Cold/hotpack [] Iontophoresis   [x] Instruction in HEP      [x] Vasopneumatic   [] Dry Needling    [x] Manual Therapy               [] Aquatic Therapy Electronically signed by:  Cyndee Hoyt, PT   1/22/2021, 8:28 AM

## 2021-01-22 NOTE — PROGRESS NOTES
Outpatient Physical Therapy           [place]           [] Phone: [phone]   Fax: [phone]  Rachel Mayo           [x] Phone: [phone]   Fax: [phone]       Referring Practitioner:  Sammie Valentin                                                From: Dilan Serrano PT               Patient: Harry Durant                                                         : 1960  Diagnosis: Diagnosis: R TKA 20-         Treatment Diagnosis: Treatment Diagnosis: R knee stiffness/impaired mobility   [x]  Progress Note                []  Discharge Note    Evaluation Date:  12/3/20   Total Visits to date:  25  Cancels/No-shows to date:    Subjective:  Patient reports  distal quad soreness present when standing from chair and going down steps; patient reports mobility/functio much improved since starting PT after R TKA  Plan of Care/Treatment to date:  [x] Therapeutic Exercise    [] Modalities:  [x] Therapeutic Activity     [] Ultrasound  [] Electrical Stimulation  [x] Gait Training      [] Cervical Traction   [] Lumbar Traction  [x] Neuromuscular Re-education  [] Cold/hotpack [] Iontophoresis  [x] Instruction in HEP      Other:  [x] Manual Therapy       [x]  Vasopneumatic  [] Aquatic Therapy       []   Dry Needle Therapy                    Objective/Significant Findings At Last Visit/Comments:   6 MWT 1232 ft;  TUG 7 sec no walker;  13 STS (sit to stands) in 30 sec; 48./80 LEFS;   Assessment:   Patient has demonstrated gains in knee FLEX AROM from 80* @ eval to 105* and EXT ROM from -15* to -5*; initial LEFS 12/80- now 48/80; R knee strength 4 to 4+/5- was 3+/5 @ eval; /5patient would benefit from further PT to address R leg weakness-  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 which requires maximum leg strength/power    Goal Status:  [] Achieved [x] Partially Achieved  [] Not Achieved   Time Frame for Long term goals : 8 weeks Long term goal 1: patient's goal: return to work  Long term goal 2: patient will have 110* of  R knee FLEX AROM in order to have minimal difficulty with rising from low surfaces and to meet work requirment of climbing  a ladder of 5/steps- currently AROM to 105*,  AAROM to 113*  Long term goal 3: patient will lack  no more than 5* of  R knee EXT in order for patient to walk without a limp-  Met goal :currently -5*  Long term goal 4: patient will score over 21/80 on LEFS and will report no difficulty with climbing 10 steps on the LEFS which indcates improved R LE function and ready for return to work current LEFS 48/80, however patient reports moderate difficulty with going down 10 steps   Changes to goals:    Patient will walk 1400 ft during 6 MWT  Indicating improved walking capacity-( male adult norm 1875 ft)    patient will complete 15 STS in 30 sec indicating increased leg strength/power ( male adult norm 15 reps)  Rehab Potential: [] Excellent [x] Good [] Fair  [] Poor  Patient Status: [] Continue per initial plan of Care     [] Patient now discharged     [x] Additional visits requested, Please re-certify for additional visits:      Requested frequency/duration:2-3/week for 4weeks  If we are requesting more visits, we fully anticipate the patient's condition is expected to improve within the treatment timeframe we are requesting. Electronically signed by:  Linette Reardon PT, 1/22/2021, 9:58 AM  If you have any questions or concerns, please don't hesitate to call.   Thank you for your referral.    Physician Signature:______________________ Date:______ Time: ________  By signing above, therapists plan is approved by physician

## 2021-01-25 ENCOUNTER — TELEPHONE (OUTPATIENT)
Dept: PHARMACY | Age: 61
End: 2021-01-25

## 2021-01-25 NOTE — TELEPHONE ENCOUNTER
Patient will go to [facility] lab before 10am. Faxed order. Patient states he has a meeting at 5:00pm.     Recent Travel Screening and Travel History documentation:     Travel Screening     Question   Response    In the last month, have you been in contact with someone who was confirmed or suspected to have Coronavirus / COVID-19? No / Unsure    Have you had a COVID-19 viral test in the last 14 days? No    Do you have any of the following new or worsening symptoms? None of these    Have you traveled internationally in the last month?   No      Travel History   Travel since 12/25/20     No documented travel since 12/25/20         CLINICAL PHARMACY CONSULT: [facility]IGDAL[facility] Stafford Tracking Only    PHSO: No  Total # of Interventions Recommended:Time Spent (min): Leah Dominguez [id number], PharmD

## 2021-01-26 ENCOUNTER — ANTI-COAG VISIT (OUTPATIENT)
Dept: PHARMACY | Age: 61
End: 2021-01-26
Payer: COMMERCIAL

## 2021-01-26 DIAGNOSIS — D68.2 FACTOR V DEFICIENCY (HCC): ICD-10-CM

## 2021-01-26 DIAGNOSIS — D68.51 FACTOR V LEIDEN MUTATION (HCC): ICD-10-CM

## 2021-01-26 LAB
INR BLD: 2.36 (ref 0.86–1.14)
PROTHROMBIN TIME: 27.6 SEC (ref 10–13.2)

## 2021-01-26 PROCEDURE — 99211 OFF/OP EST MAY X REQ PHY/QHP: CPT

## 2021-01-26 NOTE — PROGRESS NOTES
Medication Management Service  Annayun Ph[address] [phone]  Yeimy Davis [phone]    Visit Date: 1/26/2021   Subjective: All appointments have been changed to telephone visits at this time due to COVID-19. Maru Pryor is a 62 y.o. male who is having INR checked by [facility]. INR results were sent to the clinic for anticoagulation monitoring and adjustment. Patient results called in to clinic for warfarin management due to  Indication:   factor V Leiden mutation. INR goal: of 2.0-3.0. Duration of therapy: indefinite. Patient reports the following:   Not adherent with regimen - Patient states he started taking warfarin 7.5mg daily 2 weeks ago in place of previously instructed dose. Missed or extra doses:  None   Bleeding or thromboembolic side effects:  None  Significant medication changes:  None  Significant dietary changes: None  Significant alcohol or tobacco changes: None  Significant recent illness, disease state changes, or hospitalization:  None  Upcoming surgeries or procedures:  None  Falls: None           Assessment and Plan     PT/INR performed by Lab. INR today is 2.36, therapeutic. Plan: Will continue current regimen of warfarin 7.5mg daily. Recheck INR in 4 week(s). Patient has standing lab orders for PT/INR. Patient verbalized understanding of dosing directions and information discussed. Progress note sent to referring office. Patient acknowledges working in consult agreement with pharmacist as referred by his/her physician. Patient accepted that for purposes of billing, this is a virtual visit with your provider for which we will submit a claim for reimbursement with your insurance company. You may be responsible for any copays, coinsurance amounts or other amounts not covered by your insurance company.      Electronically signed by Srinivas Mcwilliams, [facility] on 1/26/21 at 2:06 PM EST

## 2021-02-05 NOTE — FLOWSHEET NOTE
Omid Dahl with [id number] approved 12 additional physical therapy visits with date range 01/26/2021-07/26/2021.   Auth # [id number]

## 2021-02-10 ENCOUNTER — HOSPITAL ENCOUNTER (OUTPATIENT)
Dept: PHYSICAL THERAPY | Age: 61
Setting detail: THERAPIES SERIES
Discharge: HOME | End: 2021-02-10
Payer: COMMERCIAL

## 2021-02-10 PROCEDURE — 97016 VASOPNEUMATIC DEVICE THERAPY: CPT

## 2021-02-10 PROCEDURE — 97110 THERAPEUTIC EXERCISES: CPT

## 2021-02-10 PROCEDURE — 97112 NEUROMUSCULAR REEDUCATION: CPT

## 2021-02-10 NOTE — FLOWSHEET NOTE
Outpatient Physical Therapy  [place]           [x] Phone: [phone]   Fax: [phone]  [place]           [] Phone: [phone]   Fax: [phone]        Physical Therapy Daily Treatment Note  Date:  2/10/2021    Patient Name:  Rea Robertson    :  1960  MRN: [id number]  Restrictions/Precautions: Other position/activity restrictions: WBAT R  Diagnosis:   Diagnosis: R TKA 20-  Date of Injury/Surgery:   Treatment Diagnosis: Treatment Diagnosis: R knee stiffness/impaired mobility    Insurance/Certification information: PT Insurance Information:18 visits approved   Referring Physician:  Referring Practitioner:  Jesusita Davison  Next Doctor Visit:    Plan of care signed (Y/N):    Outcome Measure: LEFS /TUG 30 sec with RW/ 18 sec to go up/down 4 steps  Visit# / total visits:   Pain level: 2/10 R knee    Goals:          Long term goals  Time Frame for Long term goals : 8 weeks  Long term goal 1: patient's goal: return to work  Long term goal 2: patient will have 110* of  R knee FLEX AROM in order to have minimal difficulty with rising from low surfaces and to meet work requirment of climbing  a ladde5/steps  Long term goal 3: patient will lack  no more than 5* of  R knee EXT in order for patient to walk without a limp  Long term goal 4: patient will score over 21/80 on LEFS and will report no difficulty with climbing 10 steps on the LEFS which indcates improved R LE function and ready for return to work    Summary of Evaluation: Assessment: LEFS   ASSESSMENT  Patient primary complaints:  R knee stiffness/impaired mobility   History of condition:R TKA 20- overnight stay [facility]-  Current functional limitations: difficulty sleeping -can't get comfortable- difficulty with walking- limits prolonged standing due to pain  Clinical findings: Quick DASH ; AROM: R Knee Flexion 0-145: 80*R Knee Extension 0: -15*  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 Prior to treatment was hanging ceiling tiles, climbing up and down a ladder which elevated pain levels a little. No new concerns since last visit. Pt. Notes he is going back to the doctor on 2/22, and start work again at the beginning of march. Pt. Received shingle shot yesterday, notes sore L. Shoulder and some fatigue due to shot. Pt. States he still has difficulty going down steps. \"I don't trust my knee\". Any changes in Ambulatory Summary Sheet? None        Objective    STS trial 1 x12, trail 2 x13   Knee:    Right Right   Knee flexion             117 degrees AROM    Knee extension       0 degrees        0 degrees AROM           Prior to today's treatment session, patient was screened for signs and symptoms related to COVID-19 including but not limited to verbally answering questions related to feeling ill, cough, or SOB, along with taking temperature via forehead thermometer. Patient presented with all negative signs and symptoms and had no fever >100 degrees Fahrenheit this date. Exercises: (No more than 4 columns)   Exercise/Equipment 1/20/21 1/22/21 1/10/21           WARM UP         nustep S11 Lv7  10' S11 Lv7  10' S11 Lv7  8'   Bike  10' random setting  10' random setting  10' random setting    TABLE      clamshell   x15 R. LAQ 5# 10 ct x10 5# 10 ct x10 5# 10 ct x10   Supine heel slides x10 x2 x10 R.     STANDING      marches 3 laps 3 laps Heel tap 2 x 10 6\" box 3 x10 4\" box- L heel tap 2x8 4\" box- L heel tap   Forward Step ups with L KTC  2 x10  6\" box   with L KTC  2 x10  @ steps with L KTC  2 x10  6\" box    Step overs Step overs 4\" step 2x10 Step overs 4\" step 2x10    Tandem stance    2x30\" B   SL vector training   x10 L. Rocker board Fwd/Lat  1.5\" ea way Fwd/Lat  1.5\" ea way ---   PROPRIOCEPTION      STS arms across chest 2x10 x30 sec 2x30\"   12x  x13   Mini squats // bars 10x5\" ---                      MODALITIES                    Other Therapeutic Activities/Education:  Pt to use 2 towels for a bolster at home and a belt for AAROM knee flexion as well as a bag under his foot to A with sliding     Home Exercise Program:  See above. Pt to continue current HEP    Manual Treatments:     Modalities:  Patient received vasocompression on their R knee for pain and inflammation for15 min on low pressure. Patient had negative skin reaction afterwards. Communication with other providers:  None    Assessment    Pt performed  all listed interventions and demonstrated adequate to good form with no reports of pain change. Pt reported muscle strain and fatigue with increased activity load. Demonstrated controlled eccentric R. Quad control during step overs and heel taps. Initiated tandem stance for increased balance, no adverse effects and able to perform without UE support. Pt. progressing toward goals. Pt. Requires skilled therapy for increased LE strength, balance and mobility to perform functional activity safely. Pain complaints after session 0/10. Plan for Next Session:    Continue LE strength and balance. Implement functional activity to prepare for return to work and PLOF.        Time In / Time Out:   1259/1402     Timed Code/Total Treatment Minutes:  61'  10' vaso  38'TE  15'Neuro      Next Progress Note due:      Plan of Care Interventions:  [x] Therapeutic Exercise  [] Modalities: [x] Therapeutic Activity     [] Ultrasound  [x] Estim  [x] Gait Training      [] Cervical Traction [] Lumbar Traction  [x] Neuromuscular Re-education    [] Cold/hotpack [] Iontophoresis   [x] Instruction in HEP      [x] Vasopneumatic   [] Dry Needling    [x] Manual Therapy               [] Aquatic Therapy              Electronically signed by:  Mono Fernández PTA/ Lucita Alamo SPTA   2/10/2021, 12:59 PM

## 2021-02-12 ENCOUNTER — HOSPITAL ENCOUNTER (OUTPATIENT)
Dept: PHYSICAL THERAPY | Age: 61
Setting detail: THERAPIES SERIES
Discharge: HOME | End: 2021-02-12
Payer: COMMERCIAL

## 2021-02-12 PROCEDURE — 97016 VASOPNEUMATIC DEVICE THERAPY: CPT

## 2021-02-12 PROCEDURE — 97110 THERAPEUTIC EXERCISES: CPT

## 2021-02-12 PROCEDURE — 97140 MANUAL THERAPY 1/> REGIONS: CPT

## 2021-02-12 NOTE — FLOWSHEET NOTE
Outpatient Physical Therapy  [place]           [x] Phone: [phone]   Fax: [phone]  [place]           [] Phone: [phone]   Fax: [phone]        Physical Therapy Daily Treatment Note  Date:  2021    Patient Name:  Abiodun Mancini    :  1960  MRN: [id number]  Restrictions/Precautions: Other position/activity restrictions: WBAT R  Diagnosis:   Diagnosis: R TKA 20-  Date of Injury/Surgery:   Treatment Diagnosis: Treatment Diagnosis: R knee stiffness/impaired mobility    Insurance/Certification information: PT Insurance Information:18 visits approved   Referring Physician:  Referring Practitioner:  Celia Chow Doctor Visit:    Plan of care signed (Y/N):    Outcome Measure: LEFS /TUG 30 sec with RW/ 18 sec to go up/down 4 steps  Visit# / total visits:   Pain level: 4/10 R knee    Goals:          Long term goals  Time Frame for Long term goals : 8 weeks  Long term goal 1: patient's goal: return to work  Long term goal 2: patient will have 110* of  R knee FLEX AROM in order to have minimal difficulty with rising from low surfaces and to meet work requirment of climbing  a ladde5/steps  Long term goal 3: patient will lack  no more than 5* of  R knee EXT in order for patient to walk without a limp  Long term goal 4: patient will score over 21/80 on LEFS and will report no difficulty with climbing 10 steps on the LEFS which indcates improved R LE function and ready for return to work    Summary of Evaluation: Assessment: LEFS   ASSESSMENT  Patient primary complaints:  R knee stiffness/impaired mobility   History of condition:R TKA 20- overnight stay [facility]-  Current functional limitations: difficulty sleeping -can't get comfortable- difficulty with walking- limits prolonged standing due to pain  Clinical findings: Quick DASH ; AROM: R Knee Flexion 0-145: 80*R Knee Extension 0: -15*  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 Increased muscle soreness and fatigue after last session, R. Quad, inferior patella soreness and R. Glute attributed to increased exercise difficulty during last visit. Any changes in Ambulatory Summary Sheet? None        Objective    STS trial 1 x12, trail 2 x14   Knee:    Right Right   Knee flexion             117 degrees AROM    Knee extension       0 degrees        0 degrees AROM           Prior to today's treatment session, patient was screened for signs and symptoms related to COVID-19 including but not limited to verbally answering questions related to feeling ill, cough, or SOB, along with taking temperature via forehead thermometer. Patient presented with all negative signs and symptoms and had no fever >100 degrees Fahrenheit this date. Exercises: (No more than 4 columns)   Exercise/Equipment 1/22/21 1/10/21 2/12/21           WARM UP         nustep S11 Lv7  10' S11 Lv7  8' S11 Lv7  8'   Bike  10' random setting  10' random setting  8' random setting    TABLE      clamshell  x15 R.  x15 R. Prone quad stretch   3x30\" R. with strap    LAQ 5# 10 ct x10 5# 10 ct x10 5# 10 ct x10   sidelying ABD  x10 R. 2x10 R. Supine heel slides x2 x10 R. DNP   STANDING      marches 3 laps     Heel tap 3 x10 4\" box- L heel tap 2x8 4\" box- L heel tap 2x5 2\" box  L.     Forward Step ups  with L KTC  2 x10  @ steps with L KTC  2 x10  6\" box  with L KTC  2 x10  6\" box Step overs Step overs 4\" step 2x10  DNP   Tandem stance   2x30\" B 2x30\" B   SL vector training  x10 L. DNP    Rocker board Fwd/Lat  1.5\" ea way --- DNP   PROPRIOCEPTION      STS arms across chest x30 sec 2x30\"   12x  x13 2x30\"    Mini squats // bars ---  DNP                     MODALITIES                    Other Therapeutic Activities/Education:  Pt to use 2 towels for a bolster at home and a belt for AAROM knee flexion as well as a bag under his foot to A with sliding     Home Exercise Program:  See above. Pt to continue current HEP    Manual Treatments:  R. gute roller massage      Modalities:  Patient received vasocompression on their R knee for pain and inflammation for15 min on low pressure. Patient had negative skin reaction afterwards. Communication with other providers:  None    Assessment    Pt performed all listed interventions and demonstrated adequate to good form. Pt reported muscle strain and fatigue with increased activity load. Patient was able to demonstrate good form during STS and increased reps during second trial with no UE support. Decreased box eight for heel taps due to increased muscle soreness, patient still able to demonstrate good eccentric quad control during intervention. Decreased reps/sets due to increased hip and quad soreness from last visit. Initiated prone quad stretch to promote knee flexion and decrease R. Quad muscle tension. Pt. progressing toward goals. Pt. Requires skilled therapy for increased LE strength, balance and mobility to perform functional activity safely. Pain complaints after session 3/10. Pt. edu on avoiding prolonged sitting for minimizing joint and muscle stiffness. Plan for Next Session:    Continue LE strength and balance. Implement functional activity to prepare for return to work and PLOF.        Time In / Time Out:   0806/0908    Timed Code/Total Treatment Minutes:  58'  10' MT 10'Vaso 34'TE      Next Progress Note due: Plan of Care Interventions:  [x] Therapeutic Exercise  [] Modalities:  [x] Therapeutic Activity     [] Ultrasound  [x] Estim  [x] Gait Training      [] Cervical Traction [] Lumbar Traction  [x] Neuromuscular Re-education    [] Cold/hotpack [] Iontophoresis   [x] Instruction in HEP      [x] Vasopneumatic   [] Dry Needling    [x] Manual Therapy               [] Aquatic Therapy              Electronically signed by:  Maryann Rodriguez PTA/ SHERI Petersen   2/12/2021, 8:06 AM

## 2021-02-15 ENCOUNTER — HOSPITAL ENCOUNTER (OUTPATIENT)
Dept: PHYSICAL THERAPY | Age: 61
Setting detail: THERAPIES SERIES
Discharge: HOME | End: 2021-02-15
Payer: COMMERCIAL

## 2021-02-15 PROCEDURE — 97016 VASOPNEUMATIC DEVICE THERAPY: CPT

## 2021-02-15 PROCEDURE — 97110 THERAPEUTIC EXERCISES: CPT

## 2021-02-15 NOTE — FLOWSHEET NOTE
Outpatient Physical Therapy  [place]           [x] Phone: [phone]   Fax: [phone]  [facility]           [] Phone: [phone]   Fax: [phone]        Physical Therapy Daily Treatment Note  Date:  2/15/2021    Patient Name:  Shannon Varela    :  1960  MRN: [id number]  Restrictions/Precautions: Other position/activity restrictions: WBAT R  Diagnosis:   Diagnosis: R TKA 20-  Date of Injury/Surgery:   Treatment Diagnosis: Treatment Diagnosis: R knee stiffness/impaired mobility    Insurance/Certification information: PT Insurance Information:Insurance approved 12 additional physical therapy visits with date range 2021-2021. Auth # [id number]  Referring Physician:  Referring Practitioner:  Shannan Chow Doctor Visit:    Plan of care signed (Y/N):    Outcome Measure: LEFS /TUG 30 sec with RW/ 18 sec to go up/down 4 steps  Visit# / total visits: 3/12  Pain level: 4/10 R knee    Goals:          Long term goals  Time Frame for Long term goals : 8 weeks  Long term goal 1: patient's goal: return to work  Long term goal 2: patient will have 110* of  R knee FLEX AROM in order to have minimal difficulty with rising from low surfaces and to meet work requirment of climbing  a ladde5/steps  Long term goal 3: patient will lack  no more than 5* of  R knee EXT in order for patient to walk without a limp  Long term goal 4: patient will score over 21/80 on LEFS and will report no difficulty with climbing 10 steps on the LEFS which indcates improved R LE function and ready for return to work    Summary of Evaluation: Assessment: LEFS   ASSESSMENT  Patient primary complaints:  R knee stiffness/impaired mobility   History of condition:R TKA 20- overnight stay [facility]-  Current functional limitations: difficulty sleeping -can't get comfortable- difficulty with walking- limits prolonged standing due to pain Clinical findings: Quick DASH 12/80; AROM: R Knee Flexion 0-145: 80*R Knee Extension 0: -15*  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   Skilled PT interventions are intended to:increase R knee ROM/ R leg strength which will enable patient  to return to PLOF  Patient agrees with established plan of care and assisted in the development of their  goals  Barriers to learning:none- no mental/cognitive barriers observed  Preferred learning style(s):   written- demonstration -practice  Preferred Language: English  Potential barriers to progress:none  The patient appears motivated to participate in PT and regain PLOF: yes     Subjective:  Patient come into therapy with 4/10 pain. Increased muscle soreness and fatigue after last session, R. Quad, inferior patella soreness and R. Glute attributed to increased exercise difficulty during last visit. Any changes in Ambulatory Summary Sheet? None        Objective    STS trial 1 x12, trail 2 x14   Knee:    Right Right   Knee flexion             117 degrees AROM    Knee extension       0 degrees        0 degrees AROM           Prior to today's treatment session, patient was screened for signs and symptoms related to COVID-19 including but not limited to verbally answering questions related to feeling ill, cough, or SOB, along with taking temperature via forehead thermometer. Patient presented with all negative signs and symptoms and had no fever >100 degrees Fahrenheit this date. Exercises: (No more than 4 columns)   Exercise/Equipment 1/10/21 2/12/21 2/15/2021           WARM UP         nustep S11 Lv7  8' S11 Lv7  8'    Bike  10' random setting  8' random setting  10 min     TABLE      clamshell x15 R.  x15 R.  15x R   Prone quad stretch  3x30\" R. with strap  3x30\" R with strap   LAQ 5# 10 ct x10 5# 10 ct x10 5# 10x10\"   sidelying ABD x10 R. 2x10 R.  2x10 R    Supine heel slides x10 R.   DNP    STANDING      cole Heel tap 2x8 4\" box- L heel tap 2x5 2\" box  L.  2x5 L    2\" box   Forward Step ups with L KTC  2 x10  6\" box  with L KTC  2 x10  6\" box  with L KTC  2 x10  6\" box    Step overs  DNP 4\" 2x10   Tandem stance  2x30\" B 2x30\" B 2x30\" B   SL vector training x10 L. DNP     Rocker board --- DNP    PROPRIOCEPTION      STS arms across chest 2x30\"   12x  x13 2x30\"  2x10   Mini squats // bars  DNP 10x                     MODALITIES                    Other Therapeutic Activities/Education:  Pt to use 2 towels for a bolster at home and a belt for AAROM knee flexion as well as a bag under his foot to A with sliding     Home Exercise Program:  See above. Pt to continue current HEP    Manual Treatments:  RLucy heart roller massage      Modalities:  Patient received vasocompression on their R knee for pain and inflammation for15 min on low pressure. Patient had negative skin reaction afterwards. Communication with other providers:  None    Assessment    Patient denied any pain. Continues to demonstrate weakness with eccentric control of quad exercises. Plan for Next Session:    Continue LE strength and balance. Implement functional activity to prepare for return to work and PLOF.        Time In / Time Out:  8296/5721    Timed Code/Total Treatment Minutes:  48' 15' Vaso, 28' TE      Next Progress Note due:      Plan of Care Interventions:  [x] Therapeutic Exercise  [] Modalities:  [x] Therapeutic Activity     [] Ultrasound  [x] Estim  [x] Gait Training      [] Cervical Traction [] Lumbar Traction  [x] Neuromuscular Re-education    [] Cold/hotpack [] Iontophoresis   [x] Instruction in HEP      [x] Vasopneumatic   [] Dry Needling    [x] Manual Therapy               [] Aquatic Therapy              Electronically signed by:  Luz Elena Liao PTA    2/15/2021, 8:06 AM

## 2021-02-17 ENCOUNTER — HOSPITAL ENCOUNTER (OUTPATIENT)
Dept: PHYSICAL THERAPY | Age: 61
Setting detail: THERAPIES SERIES
Discharge: HOME | End: 2021-02-17
Payer: COMMERCIAL

## 2021-02-17 PROCEDURE — 97110 THERAPEUTIC EXERCISES: CPT

## 2021-02-17 PROCEDURE — 97016 VASOPNEUMATIC DEVICE THERAPY: CPT

## 2021-02-17 PROCEDURE — 97140 MANUAL THERAPY 1/> REGIONS: CPT

## 2021-02-17 NOTE — FLOWSHEET NOTE
Outpatient Physical Therapy  [place]           [[place]] Phone: [phone]   Fax: [phone]  Cadence parekh           [] Phone: [phone]   Fax: [phone]        Physical Therapy Daily Treatment Note  Date:  2021    Patient Name:  Dai Callaway    :  1960  MRN: [id number]  Restrictions/Precautions: Other position/activity restrictions: WBAT R  Diagnosis:   Diagnosis: R TKA 20-  Date of Injury/Surgery:   Treatment Diagnosis: Treatment Diagnosis: R knee stiffness/impaired mobility    Insurance/Certification information: PT Insurance Information:Insurance approved 12 additional physical therapy visits with date range 2021-2021. Auth # [id number]  Referring Physician:  Referring Practitioner:  Art Serrano  Next Doctor Visit:    Plan of care signed (Y/N):    Outcome Measure: LEFS /TUG 30 sec with RW/ 18 sec to go up/down 4 steps  Visit# / total visits: 22/36 (18+ new 18 - ending 21)  Pain level: 2/10 R knee    Goals:          Long term goals  Time Frame for Long term goals : 8 weeks  Long term goal 1: patient's goal: return to work  Long term goal 2: patient will have 110* of  R knee FLEX AROM in order to have minimal difficulty with rising from low surfaces and to meet work requirment of climbing  a ladde5/steps  Long term goal 3: patient will lack  no more than 5* of  R knee EXT in order for patient to walk without a limp  Long term goal 4: patient will score over 21/80 on LEFS and will report no difficulty with climbing 10 steps on the LEFS which indcates improved R LE function and ready for return to work    Summary of Evaluation: Assessment: LEFS   ASSESSMENT  Patient primary complaints:  R knee stiffness/impaired mobility   History of condition:R TKA 20- overnight stay [facility]-  Current functional limitations: difficulty sleeping -can't get comfortable- difficulty with walking- limits prolonged standing due to pain Clinical findings: Quick DASH 12/80; AROM: R Knee Flexion 0-145: 80*R Knee Extension 0: -15*  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 Increased muscle soreness and fatigue after last session, R. Quad, inferior patella soreness and R. Glute attributed to increased exercise intensity. Pt. Notes feeling stronger since participating in therapy. Pt. Returns to doctor on 2/22/21. Any changes in Ambulatory Summary Sheet? None        Objective    STS trial 1 x13, trail 2 x14   Knee:    Right Right   Knee flexion             117 degrees AROM    Knee extension       0 degrees        0 degrees AROM           Prior to today's treatment session, patient was screened for signs and symptoms related to COVID-19 including but not limited to verbally answering questions related to feeling ill, cough, or SOB, along with taking temperature via forehead thermometer. Patient presented with all negative signs and symptoms and had no fever >100 degrees Fahrenheit this date.          Exercises: (No more than 4 columns)   Exercise/Equipment 2/12/21 2/15/2021 2/17/21           WARM UP         nustep S11 Lv7  8'  S11 Lv7  7'   Bike  8' random setting  10 min   7 min   TABLE      clamshell x15 R.  15x R 15x R   Prone quad stretch 3x30\" R. with strap  3x30\" R with strap 3x30\" R with strap   LAQ 5# 10 ct x10 5# 10x10\" 5# 10x10\"   sidelying ABD 2x10 R.  2x10 R  2x10 R Supine heel slides DNP     STANDING      marches   x15 airex    Heel tap 2x5 2\" box  L.  2x5 L    2\" box 2x5 R. n box    2\" box   Forward Step ups with L KTC  2 x10  6\" box  with L KTC  2 x10  6\" box  DNP   Step overs DNP 4\" 2x10 4\" 2x10   Tandem stance  2x30\" B 2x30\" B 2x30\" B   SL vector training DNP  DNP    Rocker board DNP  DNP   PROPRIOCEPTION      STS arms across chest 2x30\"  2x10 2x30\" ( reps above)   Mini squats // bars DNP 10x DNP                     MODALITIES                    Other Therapeutic Activities/Education:  Pt to use 2 towels for a bolster at home and a belt for AAROM knee flexion as well as a bag under his foot to A with sliding     Home Exercise Program:  See above. Pt to continue current HEP    Manual Treatments:    R. Quad roller massage       Modalities:  Patient received vasocompression on their R knee for pain and inflammation for 10 min on low pressure. Patient had negative skin reaction afterwards. Communication with other providers:  None    Assessment    Patient had decreased pain after session, 1/10. Continues to demonstrate weakness with eccentric control of quad exercises, but improved since previous visit. Pt. Aware of L. Hip drop during heel taps and able to correct himself. Patient able to demonstrate tandem stance without UE support. Decreased distal quad muscle tension after roller massage. Improved performance during STS. Patient reaching strength and mobility goals through participation in skilled therapy. Expected response to treatment. Plan for Next Session:    Continue LE strength and balance. Implement functional activity to prepare for return to work and PLOF. Consider eccentric squatting on shuttle at next visit.        Time In / Time Out:  0800/0902    Timed Code/Total Treatment Minutes:   58'   42'TE  10'MT  10'Vaso      Next Progress Note due:      Plan of Care Interventions:  [x] Therapeutic Exercise  [] Modalities: [x] Therapeutic Activity     [] Ultrasound  [x] Estim  [x] Gait Training      [] Cervical Traction [] Lumbar Traction  [x] Neuromuscular Re-education    [] Cold/hotpack [] Iontophoresis   [x] Instruction in HEP      [x] Vasopneumatic   [] Dry Needling    [x] Manual Therapy               [] Aquatic Therapy              Electronically signed by:  Cathryn Avina PTA/ Oniel Vargas SPTA    2/17/2021, 8:00 AM

## 2021-02-19 ENCOUNTER — HOSPITAL ENCOUNTER (OUTPATIENT)
Dept: PHYSICAL THERAPY | Age: 61
Setting detail: THERAPIES SERIES
Discharge: HOME | End: 2021-02-19
Payer: COMMERCIAL

## 2021-02-19 PROCEDURE — 97112 NEUROMUSCULAR REEDUCATION: CPT

## 2021-02-19 PROCEDURE — 97016 VASOPNEUMATIC DEVICE THERAPY: CPT

## 2021-02-19 PROCEDURE — 97110 THERAPEUTIC EXERCISES: CPT

## 2021-02-19 NOTE — FLOWSHEET NOTE
Outpatient Physical Therapy  [place]           [[place]] Phone: [phone]   Fax: [phone]  Cadence parekh           [] Phone: [phone]   Fax: [phone]        Physical Therapy Daily Treatment Note  Date:  2021    Patient Name:  Flavio Villalba    :  1960  MRN: [id number]  Restrictions/Precautions: Other position/activity restrictions: WBAT R  Diagnosis:   Diagnosis: R TKA 20-  Date of Injury/Surgery:   Treatment Diagnosis: Treatment Diagnosis: R knee stiffness/impaired mobility    Insurance/Certification information: PT Insurance Information:Insurance approved 12 additional physical therapy visits with date range 2021-2021. Auth # [id number]  Referring Physician:  Referring Practitioner:  Melanie Chow Doctor Visit:    Plan of care signed (Y/N):    Outcome Measure: LEFS /TUG 30 sec with RW/ 18 sec to go up/down 4 steps  Visit# / total visits: 23/36 (18+ new 18 - ending 21)  Pain level: 2/10 R knee    Goals:          Long term goals  Time Frame for Long term goals : 8 weeks  Long term goal 1: patient's goal: return to work  Long term goal 2: patient will have 110* of  R knee FLEX AROM in order to have minimal difficulty with rising from low surfaces and to meet work requirment of climbing  a ladde5/steps  Long term goal 3: patient will lack  no more than 5* of  R knee EXT in order for patient to walk without a limp  Long term goal 4: patient will score over 21/80 on LEFS and will report no difficulty with climbing 10 steps on the LEFS which indcates improved R LE function and ready for return to work    Summary of Evaluation: Assessment: LEFS   ASSESSMENT  Patient primary complaints:  R knee stiffness/impaired mobility   History of condition:R TKA 20- overnight stay [facility]-  Current functional limitations: difficulty sleeping -can't get comfortable- difficulty with walking- limits prolonged standing due to pain Clinical findings: Quick DASH 12/80; AROM: R Knee Flexion 0-145: 80*R Knee Extension 0: -15*  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 Increased muscle soreness and fatigue after last session. No new concerns since last visit. Patient having no difficulty sleeping. Follow up visit on 2/22. Any changes in Ambulatory Summary Sheet? None        Objective    STS trial 1 x15, trail 2 x15  Knee:    Right Right   Knee flexion             117 degrees AROM    Knee extension       0 degrees        0 degrees AROM           Prior to today's treatment session, patient was screened for signs and symptoms related to COVID-19 including but not limited to verbally answering questions related to feeling ill, cough, or SOB, along with taking temperature via forehead thermometer. Patient presented with all negative signs and symptoms and had no fever >100 degrees Fahrenheit this date.          Exercises: (No more than 4 columns)   Exercise/Equipment 2/15/2021 2/17/21 2/19/21           WARM UP         nustep  S11 Lv7  7' S11 Lv7  7'   Bike  10 min   7 min 7 min   TABLE      clamshell 15x R 15x R 15x R   Prone quad stretch 3x30\" R with strap 3x30\" R with strap 3x30\" R with strap   LAQ 5# 10x10\" 5# 10x10\" DNP   sidelying ABD 2x10 R  2x10 R  2x10 R    Supine heel slides   DNP   STANDING      marches  x15 airex  x15 airex Heel tap 2x5 L    2\" box 2x5 R.  box    2\" box x10 R.  box    2\" box   Forward Step ups with L KTC  2 x10  6\" box  DNP DNP   Step overs 4\" 2x10 4\" 2x10 4\" 2x10   Tandem stance  2x30\" B 2x30\" B 2x30\" B airex    SL vector training  DNP 8x standing on R. Slant board stretch   3x30\"    Shuttle ecc. Squats    x15           Rocker board  DNP DNP    PROPRIOCEPTION      STS arms across chest 2x10 2x30\" ( reps above) 2x30\" ( reps above)   Mini squats // bars 10x DNP                      MODALITIES   See below                  Other Therapeutic Activities/Education:  Pt to use 2 towels for a bolster at home and a belt for AAROM knee flexion as well as a bag under his foot to A with sliding     Home Exercise Program:  See above. Pt to continue current HEP    Manual Treatments:         Modalities:  Patient received vasocompression on their R knee for pain and inflammation for 10 min on low pressure. Patient had negative skin reaction afterwards. Communication with other providers:  None    Assessment    Patient reports no pain after treatment, muscle fatigue noted at distal quad. Continues to demonstrate weakness with eccentric control of quad exercises, but improved since previous visit. Pt. Aware of L. Hip drop during heel taps and able to correct himself. Patient able to demonstrate tandem stance with intermittent UE support on airex this visit. Improved performance during STS. Initiated eccentric shuttle squats, no adverse reaction. Patient reaching strength and mobility goals through participation in skilled therapy. Expected response to treatment. Plan for Next Session:    Continue LE strength and balance. Implement functional activity to prepare for return to work and PLOF, pending results from follow up appointment at doctor.      Time In / Time Out:  6549/0133    Timed Code/Total Treatment Minutes:  64'  10'vaso 15'neuro 31'TE       Next Progress Note due:      Plan of Care Interventions: [x] Therapeutic Exercise  [] Modalities:  [x] Therapeutic Activity     [] Ultrasound  [x] Estim  [x] Gait Training      [] Cervical Traction [] Lumbar Traction  [x] Neuromuscular Re-education    [] Cold/hotpack [] Iontophoresis   [x] Instruction in HEP      [x] Vasopneumatic   [] Dry Needling    [x] Manual Therapy               [] Aquatic Therapy              Electronically signed by:  Rozina Mckeon PTA/ Yoon Edmond SPTTRELL    2/19/2021, 8:01 AM

## 2021-02-22 ENCOUNTER — TELEPHONE (OUTPATIENT)
Dept: PHARMACY | Age: 61
End: 2021-02-22

## 2021-02-22 NOTE — TELEPHONE ENCOUNTER
Left patient voicemail to call clinic. Faxed lab order to [facility] lab.        CLINICAL PHARMACY CONSULT: MED RECONCILIATION/REVIEW ADDENDUM    For Pharmacy Admin Tracking Only    PHSO: No  Total # of Interventions Recommended: 0    Total Interventions Accepted: 0  Time Spent (min): 5    Teddy ReynagaD

## 2021-02-23 ENCOUNTER — ANTI-COAG VISIT (OUTPATIENT)
Dept: PHARMACY | Age: 61
End: 2021-02-23
Payer: COMMERCIAL

## 2021-02-23 DIAGNOSIS — D68.2 FACTOR V DEFICIENCY (HCC): ICD-10-CM

## 2021-02-23 DIAGNOSIS — D68.51 FACTOR V LEIDEN MUTATION (HCC): ICD-10-CM

## 2021-02-23 NOTE — TELEPHONE ENCOUNTER
Returned call and he will go to [facility] lab between [employer] and 10am.     Recent Travel Screening and Travel History documentation:     Travel Screening     Question   Response    In the last month, have you been in contact with someone who was confirmed or suspected to have Coronavirus / COVID-19? No / Unsure    Have you had a COVID-19 viral test in the last 14 days? No    Do you have any of the following new or worsening symptoms? None of these    Have you traveled internationally in the last month?   No      Travel History   Travel since 01/22/21     No documented travel since 01/22/21

## 2021-02-24 PROCEDURE — 99211 OFF/OP EST MAY X REQ PHY/QHP: CPT

## 2021-02-24 NOTE — PROGRESS NOTES
Total # of Interventions Recommended: 0  - Maintenance Safety Lab Monitoring #: 1  Total Interventions Accepted: 0  Time Spent (min): 15    Teddy AbbottD

## 2021-03-10 NOTE — PROGRESS NOTES
Outpatient Physical Therapy           [place]           [] Phone: [phone]   Fax: [phone]  Cadence parekh           [x] Phone: [phone]   Fax: [phone]       Referring Practitioner: Karyle Fell                                                From: [place], [place]               Patient: Faisal Mcgovern                                                         : 1960  Diagnosis: Diagnosis: R TKA 20-         Treatment Diagnosis: Treatment Diagnosis: R knee stiffness/impaired mobility   []  Progress Note                [x]  Discharge Note    Evaluation Date:  12/3/20   Total Visits to date: 21  Cancels/No-shows to date:    Subjective:  21 Patient come into therapy with 2/10 pain. Increased muscle soreness and fatigue after last session. No new concerns since last visit. Patient having no difficulty sleeping.  Follow up visit with surgeon on   Plan of Care/Treatment to date:  [x] Therapeutic Exercise    [] Modalities:  [x] Therapeutic Activity     [] Ultrasound  [] Electrical Stimulation  [x] Gait Training      [] Cervical Traction   [] Lumbar Traction  [x] Neuromuscular Re-education  [] Cold/hotpack [] Iontophoresis  [x] Instruction in HEP      Other:  [x] Manual Therapy       [x]  Vasopneumatic  [] Aquatic Therapy       []   Dry Needle Therapy                    Objective/Significant Findings At Last Visit/Comments:   6 MWT 1232 ft;  TUG 7 sec no walker; 15 STS (sit to stands) in 30 sec; 48./80 LEFS;   Assessment:   Patient has demonstrated gains in knee FLEX AROM from 80* @ eval to 117* and EXT ROM from -15* to -5*; initial LEFS - now 48/80; R knee strength 4 to 4+/5- was 3+/5 @ eval; /5patient would benefit from further PT to address R leg weakness-  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 which requires maximum leg strength/power    Goal Status:  [] Achieved [x] Partially Achieved  [] Not Achieved   Time Frame for Long term goals : 8 weeks  Long term goal 1: patient's goal: return to work  Long term goal 2: patient will have 110* of  R knee FLEX AROM in order to have minimal difficulty with rising from low surfaces and to meet work requirment of climbing  a ladder of 5/steps- currently AROM to 105*,  AAROM to 113*  Long term goal 3: patient will lack  no more than 5* of  R knee EXT in order for patient to walk without a limp-  Met goal :currently -5*  Long term goal 4: patient will score over 21/80 on LEFS and will report no difficulty with climbing 10 steps on the LEFS which indcates improved R LE function and ready for return to work current LEFS 48/80, however patient reports moderate difficulty with going down 10 steps   Changes to goals:    Patient will walk 1400 ft during 6 MWT  Indicating improved walking capacity-( male adult norm 5 ft)-not assessed    patient will complete 15 STS in 30 sec indicating increased leg strength/power ( male adult norm 15 reps)-met goal       [x] Patient now discharged- has not scheduled further OP PT    Electronically signed by:  Ludwin López PT, 3/10/2021, 9:54 AM  If you have any questions or concerns, please don't hesitate to call.   Thank you for your referral.

## 2021-03-12 ENCOUNTER — TELEPHONE (OUTPATIENT)
Dept: PHARMACY | Age: 61
End: 2021-03-12

## 2021-03-12 NOTE — TELEPHONE ENCOUNTER
Patient left  yesterday evening requesting warfarin refill. Did not leave pharmacy information. Called patient. Refill at [facility] ([phone]) and spoke to Dick Danielle. Called in warfarin 7.5mg tablets #90 with 2 refills.     CLINICAL PHARMACY CONSULT: MED RECONCILIATION/REVIEW ADDENDUM    For Pharmacy Admin Tracking Only    PHSO: No  Total # of Interventions Recommended: 0  - Refills Provided #: 1  Total Interventions Accepted: 0  Time Spent (min): 30    Roberto Roberts, TeddyD

## 2021-04-05 ENCOUNTER — TELEPHONE (OUTPATIENT)
Dept: PHARMACY | Age: 61
End: 2021-04-05

## 2021-04-05 NOTE — TELEPHONE ENCOUNTER
Patient will go after work between 4 and 5. Patient aware results may not be called to him until Wednesday. CLINICAL PHARMACY CONSULT: MED RECONCILIATION/REVIEW ADDENDUM    For Pharmacy Admin Tracking Only    PHSO: No  Total # of Interventions Recommended:      Total Interventions Accepted: 0  Time Spent (min): 5    Teddy RubioD

## 2021-04-07 ENCOUNTER — ANTI-COAG VISIT (OUTPATIENT)
Dept: PHARMACY | Age: 61
End: 2021-04-07
Payer: COMMERCIAL

## 2021-04-07 DIAGNOSIS — D68.2 FACTOR V DEFICIENCY (HCC): Primary | ICD-10-CM

## 2021-04-07 DIAGNOSIS — D68.51 FACTOR V LEIDEN MUTATION (HCC): ICD-10-CM

## 2021-04-07 LAB
INR BLD: 2.03 INDEX
PROTHROMBIN TIME: 23.5 SECONDS (ref 11.7–14.5)

## 2021-04-07 PROCEDURE — 36415 COLL VENOUS BLD VENIPUNCTURE: CPT

## 2021-04-07 PROCEDURE — 85610 PROTHROMBIN TIME: CPT

## 2021-04-07 NOTE — PROGRESS NOTES
Standing order for PT/INR has been placed for remote INR monitoring given efforts to reduce the spread of COVID-19.     CLINICAL PHARMACY CONSULT: MED RECONCILIATION/REVIEW ADDENDUM    For Pharmacy Admin Tracking Only    PHSO: No  Total # of Interventions Recommended: 0  - Maintenance Safety Lab Monitoring #: 1  Total Interventions Accepted: 0  Time Spent (min): 5    Juarez Corona, TeddyD

## 2021-04-07 NOTE — TELEPHONE ENCOUNTER
Received an email yesterday evening that orders in Epic were . Entered new lab orders this morning. Called patient and left VM to apologize for the confusion and reschedule. Waiting for call back.     CLINICAL PHARMACY CONSULT: MED RECONCILIATION/REVIEW ADDENDUM    For Pharmacy Admin Tracking Only    PHSO: No  Total # of Interventions Recommended: 0  Total Interventions Accepted: 0  Time Spent (min): 5    Teddy PalaciosD

## 2021-04-07 NOTE — TELEPHONE ENCOUNTER
Scheduled for today.     CLINICAL PHARMACY CONSULT: MED RECONCILIATION/REVIEW ADDENDUM    For Pharmacy Admin Tracking Only    PHSO: No  Total # of Interventions Recommended: 0  Total Interventions Accepted: 0  Time Spent (min): 5    Teddy DonohueD

## 2021-04-08 NOTE — PROGRESS NOTES
Medication Management Service  [facility]  [phone]    Visit Date: 4/7/2021   Subjective:       Anisa Hwang is a 62 y.o. male who presents to clinic today for anticoagulation monitoring and adjustment. Patient seen in clinic for warfarin management due to  Indication:   DVT. INR goal: of 2.0-3.0. Duration of therapy: indefinite. Patient reports the following:   Adherent with regimen  Missed or extra doses:  None   Bleeding or thromboembolic side effects:  None  Significant medication changes:  None  Significant dietary changes: None  Significant alcohol or tobacco changes: None  Significant recent illness, disease state changes, or hospitalization:  None  Upcoming surgeries or procedures:  None  Falls: None           Assessment and Plan     PT/INR done in office per protocol. INR yesterday was 2.03, therapeutic. Plan: Will continue current regimen of warfarin 7.5mg daily. Recheck INR in 6 week(s). Patient verbalized understanding of dosing directions and information discussed. Dosing schedule given to patient including phone number, appointment date, and time. Progress note sent to referring office. Patient acknowledges working in consult agreement with pharmacist as referred by his/her physician.       Electronically signed by GALDINO [facility] on 4/8/21 at 9:46 AM EDT    CLINICAL PHARMACY CONSULT: MED RECONCILIATION/REVIEW Loretta  22. Tracking Only    PHSO: No  Total # of Interventions Recommended: 0    - Maintenance Safety Lab Monitoring #: 1  Total Interventions Accepted: 0  Time Spent (min): Jered Gomez PharmD

## 2021-04-09 PROCEDURE — 99211 OFF/OP EST MAY X REQ PHY/QHP: CPT

## 2021-05-18 ENCOUNTER — TELEPHONE (OUTPATIENT)
Dept: PHARMACY | Age: 61
End: 2021-05-18

## 2021-05-18 NOTE — TELEPHONE ENCOUNTER
Patient no show to appointment today. Rescheduled for Tuesday 5/25.     For Pharmacy Admin Tracking Only     Time Spent (min): 5

## 2021-05-25 ENCOUNTER — ANTI-COAG VISIT (OUTPATIENT)
Dept: PHARMACY | Age: 61
End: 2021-05-25
Payer: COMMERCIAL

## 2021-05-25 DIAGNOSIS — D68.51 FACTOR V LEIDEN MUTATION (HCC): Primary | ICD-10-CM

## 2021-05-25 LAB
INR BLD: 2.1
POC INR: 2.1 INDEX
PROTHROMBIN TIME, POC: 24.9 SECONDS (ref 10–14.3)
PROTIME: 24.9 SECONDS

## 2021-05-25 PROCEDURE — 99212 OFFICE O/P EST SF 10 MIN: CPT

## 2021-05-25 PROCEDURE — 36416 COLLJ CAPILLARY BLOOD SPEC: CPT

## 2021-05-25 PROCEDURE — 85610 PROTHROMBIN TIME: CPT

## 2021-05-25 NOTE — PROGRESS NOTES
Medication Management Service  [facility]  [phone]    Visit Date: 5/25/2021   Subjective:       Garrick Mcgowan is a 62 y.o. male who presents to clinic today for anticoagulation monitoring and adjustment. Patient seen in clinic for warfarin management due to  Indication:   factor V Leiden mutation. INR goal: of 2.0-3.0. Duration of therapy: indefinite. Patient reports the following:   Adherent with regimen  Missed or extra doses:  None   Bleeding or thromboembolic side effects:  None  Significant medication changes:  None  Significant dietary changes: None  Significant alcohol or tobacco changes: None  Significant recent illness, disease state changes, or hospitalization:  None  Upcoming surgeries or procedures:  None  Falls: None           Assessment and Plan     PT/INR done in office per protocol. INR today is 2.1, therapeutic. Rx called in to [facility] for warfarin 7.5mg tablets #90 with 2 refills. Plan: Will continue current regimen of warfarin 7.5mg daily. Recheck INR in 8 week(s). Patient verbalized understanding of dosing directions and information discussed. Dosing schedule given to patient including phone number, appointment date, and time. Progress note sent to referring office. Patient acknowledges working in consult agreement with pharmacist as referred by his/her physician.       Electronically signed by Rodolfo Brambila [facility] on 5/25/21 at 4:52 PM EDT    For Pharmacy Admin Tracking Only     Intervention Detail: Refill(s) Provided   Total # of Interventions Recommended: 1   Total # of Interventions Accepted: 1   Time Spent (min): 15

## 2021-07-20 ENCOUNTER — ANTI-COAG VISIT (OUTPATIENT)
Dept: PHARMACY | Age: 61
End: 2021-07-20
Payer: COMMERCIAL

## 2021-07-20 DIAGNOSIS — D68.51 FACTOR V LEIDEN MUTATION (HCC): Primary | ICD-10-CM

## 2021-07-20 LAB
INR BLD: 2.4
POC INR: 2.4 INDEX
PROTHROMBIN TIME, POC: 28.5 SECONDS (ref 10–14.3)
PROTIME: 28.5 SECONDS

## 2021-07-20 PROCEDURE — 85610 PROTHROMBIN TIME: CPT

## 2021-07-20 PROCEDURE — 99211 OFF/OP EST MAY X REQ PHY/QHP: CPT

## 2021-07-20 PROCEDURE — 36416 COLLJ CAPILLARY BLOOD SPEC: CPT

## 2021-09-14 ENCOUNTER — ANTI-COAG VISIT (OUTPATIENT)
Dept: PHARMACY | Age: 61
End: 2021-09-14
Payer: COMMERCIAL

## 2021-09-14 DIAGNOSIS — D68.51 FACTOR V LEIDEN MUTATION (HCC): Primary | ICD-10-CM

## 2021-09-14 LAB
INR BLD: 2.5
POC INR: 2.5 INDEX
PROTHROMBIN TIME, POC: 30 SECONDS (ref 10–14.3)
PROTIME: 30 SECONDS

## 2021-09-14 PROCEDURE — 36416 COLLJ CAPILLARY BLOOD SPEC: CPT

## 2021-09-14 PROCEDURE — 99211 OFF/OP EST MAY X REQ PHY/QHP: CPT

## 2021-09-14 PROCEDURE — 85610 PROTHROMBIN TIME: CPT

## 2021-09-14 NOTE — PROGRESS NOTES
Medication Management Service  [facility]  [phone]    Visit Date: 9/14/2021   Subjective:       Khloe Cano is a 62 y.o. male who presents to clinic today for anticoagulation monitoring and adjustment. Patient seen in clinic for warfarin management due to  Indication:   factor V Leiden mutation. INR goal: of 2.0-3.0. Duration of therapy: indefinite. Patient reports the following:   Adherent with regimen  Missed or extra doses:  None   Bleeding or thromboembolic side effects:  None  Significant medication changes:  None  Significant dietary changes: None  Significant alcohol or tobacco changes: None  Significant recent illness, disease state changes, or hospitalization:  None  Upcoming surgeries or procedures:  None  Falls: None           Assessment and Plan     PT/INR done in office per protocol. INR today is 2.5, therapeutic. Plan: Will continue current regimen of warfarin 7.5mg daily. Recheck INR in 8 week(s). Patient will follow up with PCP. He is getting a home meter. Will discharge patient from clinic at this time. Patient verbalized understanding of dosing directions and information discussed. Dosing schedule given to patient including phone number, appointment date, and time. Progress note sent to referring office. Patient acknowledges working in consult agreement with pharmacist as referred by his/her physician.       Electronically signed by GALDINO Paul [facility] on 9/14/21 at 4:50 PM EDT    For Pharmacy Admin Tracking Only     Total # of Interventions Recommended: 0   Total # of Interventions Accepted: 0   Time Spent (min): 15

## 2021-11-01 ENCOUNTER — OFFICE VISIT (OUTPATIENT)
Dept: INTERNAL MEDICINE CLINIC | Age: 61
End: 2021-11-01
Payer: COMMERCIAL

## 2021-11-01 VITALS
BODY MASS INDEX: 35.25 KG/M2 | RESPIRATION RATE: 16 BRPM | HEIGHT: 73 IN | HEART RATE: 72 BPM | DIASTOLIC BLOOD PRESSURE: 82 MMHG | SYSTOLIC BLOOD PRESSURE: 130 MMHG | OXYGEN SATURATION: 98 % | WEIGHT: 266 LBS

## 2021-11-01 DIAGNOSIS — I10 ESSENTIAL HYPERTENSION: ICD-10-CM

## 2021-11-01 DIAGNOSIS — M51.36 DDD (DEGENERATIVE DISC DISEASE), LUMBAR: ICD-10-CM

## 2021-11-01 DIAGNOSIS — D68.51 FACTOR V LEIDEN MUTATION (HCC): Primary | ICD-10-CM

## 2021-11-01 DIAGNOSIS — E78.5 DYSLIPIDEMIA: ICD-10-CM

## 2021-11-01 DIAGNOSIS — R73.01 IMPAIRED FASTING GLUCOSE: ICD-10-CM

## 2021-11-01 LAB
INTERNATIONAL NORMALIZATION RATIO, POC: 1.8
PROTHROMBIN TIME, POC: 22.1

## 2021-11-01 PROCEDURE — 85610 PROTHROMBIN TIME: CPT | Performed by: INTERNAL MEDICINE

## 2021-11-01 PROCEDURE — 99204 OFFICE O/P NEW MOD 45 MIN: CPT | Performed by: INTERNAL MEDICINE

## 2021-11-01 RX ORDER — LISINOPRIL AND HYDROCHLOROTHIAZIDE 25; 20 MG/1; MG/1
1 TABLET ORAL 2 TIMES DAILY
Qty: 180 TABLET | Refills: 1 | Status: SHIPPED | OUTPATIENT
Start: 2021-11-01 | End: 2022-05-02 | Stop reason: SDUPTHER

## 2021-11-01 RX ORDER — GABAPENTIN 300 MG/1
300 CAPSULE ORAL 3 TIMES DAILY
Qty: 90 CAPSULE | Refills: 0 | Status: SHIPPED | OUTPATIENT
Start: 2021-11-01 | End: 2021-11-09 | Stop reason: SDUPTHER

## 2021-11-01 RX ORDER — CYCLOBENZAPRINE HCL 5 MG
5 TABLET ORAL 3 TIMES DAILY PRN
COMMUNITY
End: 2022-09-16

## 2021-11-01 RX ORDER — WARFARIN SODIUM 7.5 MG/1
7.5 TABLET ORAL DAILY
Qty: 90 TABLET | Refills: 1 | Status: SHIPPED | OUTPATIENT
Start: 2021-11-01 | End: 2021-11-09 | Stop reason: SDUPTHER

## 2021-11-01 SDOH — ECONOMIC STABILITY: FOOD INSECURITY: WITHIN THE PAST 12 MONTHS, THE FOOD YOU BOUGHT JUST DIDN'T LAST AND YOU DIDN'T HAVE MONEY TO GET MORE.: NEVER TRUE

## 2021-11-01 SDOH — ECONOMIC STABILITY: FOOD INSECURITY: WITHIN THE PAST 12 MONTHS, YOU WORRIED THAT YOUR FOOD WOULD RUN OUT BEFORE YOU GOT MONEY TO BUY MORE.: NEVER TRUE

## 2021-11-01 ASSESSMENT — PATIENT HEALTH QUESTIONNAIRE - PHQ9
1. LITTLE INTEREST OR PLEASURE IN DOING THINGS: 0
SUM OF ALL RESPONSES TO PHQ QUESTIONS 1-9: 0
SUM OF ALL RESPONSES TO PHQ QUESTIONS 1-9: 0
2. FEELING DOWN, DEPRESSED OR HOPELESS: 0
SUM OF ALL RESPONSES TO PHQ9 QUESTIONS 1 & 2: 0
SUM OF ALL RESPONSES TO PHQ QUESTIONS 1-9: 0

## 2021-11-01 ASSESSMENT — SOCIAL DETERMINANTS OF HEALTH (SDOH): HOW HARD IS IT FOR YOU TO PAY FOR THE VERY BASICS LIKE FOOD, HOUSING, MEDICAL CARE, AND HEATING?: NOT VERY HARD

## 2021-11-01 NOTE — PROGRESS NOTES
leg swelling, no orthopnea, no PND, no syncope  Pulmonary: no dyspnea, no cough, no wheezing, no hemoptysis  GI: no nausea, no vomiting, no diarrhea, no constipation, no abdominal pain, no hematochezia  : no dysuria, no frequency, no urgency, no hematuria, no frothy urine  MSK: no arthralgias, no myalgias, no early morning stiffness, no Raynaud's   Neuro: no focal neurological deficits, no seizures  Sleep: no snoring, no daytime somnolence   Psych: no depression, no anxiety, no suicidal ideation      Physical Exam:  VITALS:   /82 (Site: Right Upper Arm, Position: Sitting, Cuff Size: Medium Adult)   Pulse 72   Resp 16   Ht 6' 1\" (1.854 m)   Wt 266 lb (120.7 kg)   SpO2 98%   BMI 35.09 kg/m²     PHYSICAL EXAMINATION:  General: alert, awake, and oriented to time, place, person, and situation. Not in acute distress   Skin:  no suspicious rashes, no jaundice  Head: normocephalic/atraumatic  Eyes: anicteric sclera, well-injected conjunctiva. Pupils are equally round and reactive to light. Extraocular movements are intact   Nose: no septal deviation evident  Sinuses: no sinus tenderness  Ears: external ears normal  Neck: supple, no cervical lymphadenopathy, thyroid symmetric and not enlarged, no bruits   Heart: regular rate and rhythm, regular S1/S2, no S3/S4, no audible murmurs, no audible friction rub  Lungs: clear to auscultation bilaterally, no audible crackles, no audible wheezes, no audible rhonchi    Abdomen: normal bowel sounds, soft abdomen, non-tender, no palpable masses  Extremities: trace pedal edema BLE with varicose veins, warm, no cyanosis, no clubbing. Good capillary refill   MSK: no tenderness across spinous processes, full ROM in all 4 extremities.  No joint swelling or tenderness   Peripheral vascular: 2+ pulses symmetric (radial)  Neuro: gait normal, CN II-XII intact, motor power 5/5 in all 4 extremities, sensation intact and symmetric    Labs   I have personally reviewed labs, and discussed pertinent findings with patient on this date 11/1/2021     Imaging   I have personally reviewed imaging, and discussed pertinent findings with patient on this date 11/1/2021     Other notes  I have personally reviewed other notes, and discussed pertinent findings with patient on this date 11/1/2021       Assessment/Plan:     1. Factor V Leiden mutation ([facility] [address]  POCT INR today 1.8  Missed a dose 48 hours ago and had annabella  Reports he has been stable on Coumadin 7.5mg QD chronically. Will not make changes. Does not want INR checked sooner. Wants to keep at every 6 weeks (was getting every 8 weeks checked at [facility] but I do recommend per ATS to not exceed 4-6 weeks check)  - POCT INR  - POCT INR; Standing    2. Essential hypertension  Stable  Continue Lisinopril/HCTZ 20/25mg BID   - CBC Auto Differential; Future  - COMPREHENSIVE METABOLIC PANEL; Future    3. DDD (degenerative disc disease), lumbar  OARRS reviewed  Wants to cut back on Gabapentin  Taper down 300mg TID to 300mg BID x2 weeks followed by 300mg QD x2 weeks then stop  Does not want follow up closer than every 6 months     4. Impaired fasting glucose  - HEMOGLOBIN A1C; Future    5. Dyslipidemia  ,  in Feb 2021  Not on statin   - Lipid, Fasting; Future      Care discussed with patient and questions answered. Patient verbalizes understanding and agrees with plan. Discussed with patient the importance of continuity of care. I encouraged patient to schedule next appointment within 6 months per patient preference with me. Patient prefers to be reached by Phone call at [phone] for future medical correspondence. Encouraged to activate MyChart. I reviewed and reconciled the medications this visit. I reviewed and updated the past medical, surgical, social, and family history during this visit. After visit summary provided.        Janna Austin MD  Internal Medicine  11/1/2021   5:43 PM

## 2021-11-01 NOTE — PATIENT INSTRUCTIONS
Fasting for a blood test: taking the right steps before testing helps ensure your results will be accurate. Why do I need to fast before my blood test?  If your healthcare provider has told you to fast before a blood test, it means you should not eat or drink anything, except water, for several hours before your test. When you eat and drink normally, those foods and beverages are absorbed into your bloodstream. That could affect the results of certain types of blood tests. What types of blood tests require fasting? The most common tests that require fasting are:   Glucose tests, which measure your blood sugar.  Lipid tests, which measure cholesterol and triglycerides. You do not need to be fasting for HbA1C test.     How long do I have to fast before the test?  You usually need to fast for 8-12 hours before the test. Most tests that require fasting are scheduled for early in the morning. That way, most of your fasting time will be overnight. Can I drink anything besides water during a fast?  No. Juice, coffee, soda, and other beverages can get in your bloodstream and affect your results. In addition, you should not:   Chew gum    Smoke    Exercise  These activities can also affect your results. But you can drink water. It's actually encouraged that you drink 2 glasses of water before any blood test. It helps keep more fluid in your veins, which can make it easier to draw blood. If you are dehydrated, your blood draw experience may be unpleasant. Can I continue taking medicine during a fast?  Most of the time it's OK to take your usual medicines with water, unless otherwise specified by your healthcare provider. You may need to avoid certain medicines that you normally take with food.      What if I make a mistake and have something to eat or drink besides water during my fast?  Tell your healthcare provider before your test. Your test will most likely have to be re-scheduled for another time when you are able to complete your fast.    When can I eat and drink normally again? As soon as your test is over. You may want to bring a snack with you, so you can eat right away. Is there anything else I need to know about fasting before a blood test?  Be sure to talk to your healthcare provider if you have any questions or concerns about fasting. You should talk to your provider before taking any lab test. Most tests don't require fasting or other special preparations. For others, you may need to avoid certain foods, medicines, or activities.        [facility] Internal Medicine  [phone]

## 2021-11-09 DIAGNOSIS — D68.51 FACTOR V LEIDEN MUTATION (HCC): ICD-10-CM

## 2021-11-09 DIAGNOSIS — M51.36 DDD (DEGENERATIVE DISC DISEASE), LUMBAR: ICD-10-CM

## 2021-11-09 RX ORDER — WARFARIN SODIUM 7.5 MG/1
7.5 TABLET ORAL DAILY
Qty: 90 TABLET | Refills: 1 | Status: SHIPPED
Start: 2021-11-09 | End: 2022-03-08 | Stop reason: SDUPTHER

## 2021-11-09 RX ORDER — GABAPENTIN 300 MG/1
300 CAPSULE ORAL 3 TIMES DAILY
Qty: 90 CAPSULE | Refills: 0 | Status: SHIPPED | OUTPATIENT
Start: 2021-11-09 | End: 2022-04-12 | Stop reason: ALTCHOICE

## 2021-11-09 NOTE — TELEPHONE ENCOUNTER
Patient's medication for warfarin and gabapentin was sent to wrong pharmacy - please resend to [facility]

## 2021-11-13 ENCOUNTER — HOSPITAL ENCOUNTER (OUTPATIENT)
Age: 61
Discharge: HOME | End: 2021-11-13
Payer: COMMERCIAL

## 2021-11-13 LAB
ALBUMIN SERPL-MCNC: 4.2 GM/DL (ref 3.4–5)
ALP BLD-CCNC: 74 IU/L (ref 40–129)
ALT SERPL-CCNC: 21 U/L (ref 10–40)
ANION GAP SERPL CALCULATED.3IONS-SCNC: 9 MMOL/L (ref 4–16)
AST SERPL-CCNC: 22 IU/L (ref 15–37)
BASOPHILS ABSOLUTE: 0 K/CU MM
BASOPHILS RELATIVE PERCENT: 0.2 % (ref 0–1)
BILIRUB SERPL-MCNC: 0.4 MG/DL (ref 0–1)
BUN BLDV-MCNC: 13 MG/DL (ref 6–23)
CALCIUM SERPL-MCNC: 9.8 MG/DL (ref 8.3–10.6)
CHLORIDE BLD-SCNC: 101 MMOL/L (ref 99–110)
CHOLESTEROL, FASTING: 161 MG/DL
CO2: 30 MMOL/L (ref 21–32)
CREAT SERPL-MCNC: 1.1 MG/DL (ref 0.9–1.3)
DIFFERENTIAL TYPE: ABNORMAL
EOSINOPHILS ABSOLUTE: 0.1 K/CU MM
EOSINOPHILS RELATIVE PERCENT: 1.7 % (ref 0–3)
ESTIMATED AVERAGE GLUCOSE: 111 MG/DL
GFR AFRICAN AMERICAN: >60 ML/MIN/1.73M2
GFR NON-AFRICAN AMERICAN: >60 ML/MIN/1.73M2
GLUCOSE BLD-MCNC: 94 MG/DL (ref 70–99)
HBA1C MFR BLD: 5.5 % (ref 4.2–6.3)
HCT VFR BLD CALC: 50.4 % (ref 42–52)
HDLC SERPL-MCNC: 43 MG/DL
HEMOGLOBIN: 16.8 GM/DL (ref 13.5–18)
IMMATURE NEUTROPHIL %: 0.4 % (ref 0–0.43)
LDL CHOLESTEROL DIRECT: 100 MG/DL
LYMPHOCYTES ABSOLUTE: 2 K/CU MM
LYMPHOCYTES RELATIVE PERCENT: 24.2 % (ref 24–44)
MCH RBC QN AUTO: 30.1 PG (ref 27–31)
MCHC RBC AUTO-ENTMCNC: 33.3 % (ref 32–36)
MCV RBC AUTO: 90.3 FL (ref 78–100)
MONOCYTES ABSOLUTE: 0.7 K/CU MM
MONOCYTES RELATIVE PERCENT: 8.2 % (ref 0–4)
PDW BLD-RTO: 12.8 % (ref 11.7–14.9)
PLATELET # BLD: 245 K/CU MM (ref 140–440)
PMV BLD AUTO: 9.8 FL (ref 7.5–11.1)
POTASSIUM SERPL-SCNC: 4.5 MMOL/L (ref 3.5–5.1)
RBC # BLD: 5.58 M/CU MM (ref 4.6–6.2)
SEGMENTED NEUTROPHILS ABSOLUTE COUNT: 5.3 K/CU MM
SEGMENTED NEUTROPHILS RELATIVE PERCENT: 65.3 % (ref 36–66)
SODIUM BLD-SCNC: 140 MMOL/L (ref 135–145)
TOTAL IMMATURE NEUTOROPHIL: 0.03 K/CU MM
TOTAL PROTEIN: 7.5 GM/DL (ref 6.4–8.2)
TRIGLYCERIDE, FASTING: 141 MG/DL
WBC # BLD: 8.2 K/CU MM (ref 4–10.5)

## 2021-11-13 PROCEDURE — 80053 COMPREHEN METABOLIC PANEL: CPT

## 2021-11-13 PROCEDURE — 36415 COLL VENOUS BLD VENIPUNCTURE: CPT

## 2021-11-13 PROCEDURE — 80061 LIPID PANEL: CPT

## 2021-11-13 PROCEDURE — 83036 HEMOGLOBIN GLYCOSYLATED A1C: CPT

## 2021-11-13 PROCEDURE — 85025 COMPLETE CBC W/AUTO DIFF WBC: CPT

## 2021-11-22 ENCOUNTER — TELEPHONE (OUTPATIENT)
Dept: INTERNAL MEDICINE CLINIC | Age: 61
End: 2021-11-22

## 2021-11-22 NOTE — TELEPHONE ENCOUNTER
----- Message from Jenifer Starr MD sent at 11/19/2021  1:11 PM EST -----  Please inform patient labs are stable. No medication changes needed at the moment.

## 2021-12-15 ENCOUNTER — NURSE ONLY (OUTPATIENT)
Dept: INTERNAL MEDICINE CLINIC | Age: 61
End: 2021-12-15
Payer: COMMERCIAL

## 2021-12-15 DIAGNOSIS — D68.51 FACTOR V LEIDEN MUTATION (HCC): ICD-10-CM

## 2021-12-15 LAB
INTERNATIONAL NORMALIZATION RATIO, POC: 3.4
PROTHROMBIN TIME, POC: ABNORMAL

## 2021-12-15 PROCEDURE — 85610 PROTHROMBIN TIME: CPT | Performed by: INTERNAL MEDICINE

## 2021-12-17 ENCOUNTER — TELEPHONE (OUTPATIENT)
Dept: INTERNAL MEDICINE CLINIC | Age: 61
End: 2021-12-17

## 2021-12-17 NOTE — TELEPHONE ENCOUNTER
----- Message from Praneeth Monsalve MD sent at 12/15/2021  5:33 PM EST -----  Patient is usually adamant about keeping Coumadin at 7.5mg. however his INR is supratherapeutic. I would recommend cutting down his dose to 7mg daily. If he insists on keeping it at 7.5mg QD, he needs to skip dose tonight.  Repeat INR in 1 month

## 2022-01-24 ENCOUNTER — TELEPHONE (OUTPATIENT)
Dept: INTERNAL MEDICINE CLINIC | Age: 62
End: 2022-01-24

## 2022-01-24 NOTE — TELEPHONE ENCOUNTER
Patient was rescheduled for a Lab visit on 1/26/22 at 4:30 for a INR. He was unable to come in any sooner to see Dr. Kirk Cline. He states he is good on medications at the moment.

## 2022-01-26 ENCOUNTER — NURSE ONLY (OUTPATIENT)
Dept: INTERNAL MEDICINE CLINIC | Age: 62
End: 2022-01-26
Payer: COMMERCIAL

## 2022-01-26 DIAGNOSIS — D68.51 FACTOR V LEIDEN MUTATION (HCC): ICD-10-CM

## 2022-01-26 LAB
INTERNATIONAL NORMALIZATION RATIO, POC: 2
PROTHROMBIN TIME, POC: NORMAL

## 2022-01-26 PROCEDURE — 85610 PROTHROMBIN TIME: CPT | Performed by: INTERNAL MEDICINE

## 2022-01-26 NOTE — PROGRESS NOTES
Pt's inr done as noted. Pt would like a referral to go back to the coumin clinic. Please review and advise.

## 2022-01-27 DIAGNOSIS — D68.51 FACTOR V LEIDEN MUTATION (HCC): Primary | ICD-10-CM

## 2022-01-28 ENCOUNTER — TELEPHONE (OUTPATIENT)
Dept: PHARMACY | Age: 62
End: 2022-01-28

## 2022-01-28 ENCOUNTER — TELEPHONE (OUTPATIENT)
Dept: INTERNAL MEDICINE CLINIC | Age: 62
End: 2022-01-28

## 2022-01-28 DIAGNOSIS — D68.51 FACTOR V LEIDEN MUTATION (HCC): Primary | ICD-10-CM

## 2022-01-28 NOTE — TELEPHONE ENCOUNTER
Patient left  stating he has changed to Dr. Erica Thomas and has requested a referral to be sent for him to return to the clinic. Returned call. Notified patient clinic has not yet received a referral. Will contact patient once referral is received.     For Pharmacy Admin Tracking Only     Time Spent (min): 5

## 2022-02-01 NOTE — TELEPHONE ENCOUNTER
New referral received from Dr. Windy Mondragon. Called patient. He has been having INR checked every 6 weeks. Scheduled for 3/8/22.     For Pharmacy Admin Tracking Only     Time Spent (min): 5

## 2022-03-08 ENCOUNTER — ANTI-COAG VISIT (OUTPATIENT)
Dept: PHARMACY | Age: 62
End: 2022-03-08
Payer: COMMERCIAL

## 2022-03-08 DIAGNOSIS — D68.2 FACTOR V DEFICIENCY (HCC): Primary | ICD-10-CM

## 2022-03-08 LAB
INR BLD: 2.3
POC INR: 2.3 INDEX
PROTHROMBIN TIME, POC: 27.5 SECONDS (ref 10–14.3)
PROTIME: 27.5 SECONDS

## 2022-03-08 PROCEDURE — 99212 OFFICE O/P EST SF 10 MIN: CPT

## 2022-03-08 PROCEDURE — 85610 PROTHROMBIN TIME: CPT

## 2022-03-08 PROCEDURE — 36416 COLLJ CAPILLARY BLOOD SPEC: CPT

## 2022-03-08 RX ORDER — WARFARIN SODIUM 7.5 MG/1
7.5 TABLET ORAL DAILY
Qty: 90 TABLET | Refills: 2 | Status: SHIPPED | OUTPATIENT
Start: 2022-03-08

## 2022-03-08 NOTE — PROGRESS NOTES
Medication Management Service  [facility]  [phone]    Visit Date: 3/8/2022   Subjective:       Kirti Trejo is a 65 y.o. male who presents to clinic today for anticoagulation monitoring and adjustment. Patient seen in clinic for warfarin management due to  Indication:   factor V Leiden mutation. INR goal: of 2.0-3.0. Duration of therapy: indefinite. Patient reports the following:   Adherent with regimen  Missed or extra doses:  None   Bleeding or thromboembolic side effects:  None  Significant medication changes:  None  Significant dietary changes: None  Significant alcohol or tobacco changes: None  Significant recent illness, disease state changes, or hospitalization:  None  Upcoming surgeries or procedures:  None  Falls: None           Assessment and Plan     PT/INR done in office per protocol. INR today is 2.3, therapeutic. Plan: Will continue current regimen of warfarin 7.5mg daily. Recheck INR in 5 week(s). Rx sent to [facility] for warfarin 7.5mg tablets #90 with 2 refills    Patient verbalized understanding of dosing directions and information discussed. Dosing schedule given to patient including phone number, appointment date, and time. Progress note sent to referring office. Patient acknowledges working in consult agreement with pharmacist as referred by his/her physician.       Electronically signed by Jenna Fisher [facility] on 3/8/22 at 4:48 PM EST    For Pharmacy Admin Tracking Only     Intervention Detail: Refill(s) Provided   Total # of Interventions Recommended: 1   Total # of Interventions Accepted: 1   Time Spent (min): 20

## 2022-04-12 ENCOUNTER — ANTI-COAG VISIT (OUTPATIENT)
Dept: PHARMACY | Age: 62
End: 2022-04-12
Payer: COMMERCIAL

## 2022-04-12 DIAGNOSIS — D68.2 FACTOR V DEFICIENCY (HCC): Primary | ICD-10-CM

## 2022-04-12 LAB
INR BLD: 2.6
POC INR: 2.6 INDEX
PROTHROMBIN TIME, POC: 31 SECONDS (ref 10–14.3)
PROTIME: 31 SECONDS

## 2022-04-12 PROCEDURE — 36416 COLLJ CAPILLARY BLOOD SPEC: CPT

## 2022-04-12 PROCEDURE — 99211 OFF/OP EST MAY X REQ PHY/QHP: CPT

## 2022-04-12 PROCEDURE — 85610 PROTHROMBIN TIME: CPT

## 2022-04-12 NOTE — PROGRESS NOTES
Medication Management Service  [facility]  [phone]    Visit Date: 4/12/2022   Subjective:       Peace Boles is a 65 y.o. male who presents to clinic today for anticoagulation monitoring and adjustment. Patient seen in clinic for warfarin management due to  Indication:   factor V Leiden mutation. INR goal: of 2.0-3.0. Duration of therapy: indefinite. Patient reports the following:   Adherent with regimen  Missed or extra doses:  None   Bleeding or thromboembolic side effects:  None  Significant medication changes:  None  Significant dietary changes: None  Significant alcohol or tobacco changes: None  Significant recent illness, disease state changes, or hospitalization:  None  Upcoming surgeries or procedures:  None  Falls: None           Assessment and Plan     PT/INR done in office per protocol. INR today is 2.6, therapeutic. Plan: Will continue current regimen of warfarin 7.5mg daily. Recheck INR in 6 week(s). Patient verbalized understanding of dosing directions and information discussed. Dosing schedule given to patient including phone number, appointment date, and time. Progress note sent to referring office. Patient acknowledges working in consult agreement with pharmacist as referred by his/her physician.       Electronically signed by GALDINO Rincon [facility] on 4/12/22 at 1:16 PM EDT    For Pharmacy Admin Tracking Only     Total # of Interventions Recommended: 0   Total # of Interventions Accepted: 0   Time Spent (min): 15

## 2022-04-14 ENCOUNTER — TELEPHONE (OUTPATIENT)
Dept: INTERNAL MEDICINE CLINIC | Age: 62
End: 2022-04-14

## 2022-04-14 NOTE — RESULT ENCOUNTER NOTE
Patient sees Coumadin clinic; continue with routine monitoring per [facility] etc

## 2022-05-02 ENCOUNTER — OFFICE VISIT (OUTPATIENT)
Dept: INTERNAL MEDICINE CLINIC | Age: 62
End: 2022-05-02
Payer: COMMERCIAL

## 2022-05-02 VITALS
HEART RATE: 81 BPM | HEIGHT: 73 IN | RESPIRATION RATE: 20 BRPM | WEIGHT: 270 LBS | DIASTOLIC BLOOD PRESSURE: 84 MMHG | SYSTOLIC BLOOD PRESSURE: 128 MMHG | BODY MASS INDEX: 35.78 KG/M2 | OXYGEN SATURATION: 97 %

## 2022-05-02 DIAGNOSIS — H93.11 TINNITUS, RIGHT EAR: ICD-10-CM

## 2022-05-02 DIAGNOSIS — E78.5 DYSLIPIDEMIA: ICD-10-CM

## 2022-05-02 DIAGNOSIS — I10 ESSENTIAL HYPERTENSION: Primary | ICD-10-CM

## 2022-05-02 PROCEDURE — 99214 OFFICE O/P EST MOD 30 MIN: CPT | Performed by: INTERNAL MEDICINE

## 2022-05-02 RX ORDER — FLUTICASONE PROPIONATE 50 MCG
1 SPRAY, SUSPENSION (ML) NASAL DAILY
Qty: 16 G | Refills: 0 | Status: SHIPPED | OUTPATIENT
Start: 2022-05-02 | End: 2022-05-02

## 2022-05-02 RX ORDER — LORATADINE AND PSEUDOEPHEDRINE SULFATE 10; 240 MG/1; MG/1
1 TABLET, EXTENDED RELEASE ORAL DAILY
Qty: 3 TABLET | Refills: 0 | Status: SHIPPED | OUTPATIENT
Start: 2022-05-02 | End: 2022-05-09

## 2022-05-02 RX ORDER — LORATADINE AND PSEUDOEPHEDRINE SULFATE 10; 240 MG/1; MG/1
1 TABLET, EXTENDED RELEASE ORAL DAILY
Qty: 3 TABLET | Refills: 0 | Status: SHIPPED | OUTPATIENT
Start: 2022-05-02 | End: 2022-05-02

## 2022-05-02 RX ORDER — FLUTICASONE PROPIONATE 50 MCG
1 SPRAY, SUSPENSION (ML) NASAL DAILY
Qty: 16 G | Refills: 0 | Status: SHIPPED | OUTPATIENT
Start: 2022-05-02 | End: 2022-06-27

## 2022-05-02 RX ORDER — LISINOPRIL AND HYDROCHLOROTHIAZIDE 25; 20 MG/1; MG/1
1 TABLET ORAL 2 TIMES DAILY
Qty: 180 TABLET | Refills: 1 | Status: SHIPPED | OUTPATIENT
Start: 2022-05-02

## 2022-05-02 NOTE — PROGRESS NOTES
5/2/22    Lance Groves  1960    Chief Complaint   Patient presents with    6 Month Follow-Up       History of Present Illness:  Lance Groves is a 65 y.o. pleasant gentleman presenting today with a chief complaint of HTN, tinnitus R ear. He has a past medical history significant for:  HTN, on Lisinopril/HCTZ 20/25mg BID  HL (,  on 11/13/2021), not on statin   Provoked LLE DVT in he setting of knee surgery without bridging (11/2020), on Coumadin 7.5mg QD   Factor V Leiden mutation, on Coumadin   H/o gout   DDD, off Gabapentin 300mg TID, on Flexeril 5mg QD PRN   Obesity (BMI 35)   S/p appendectomy   S/p partial R shoulder replacement   S/p R TKA  Former smoker (quit 2006)      # Reports tinnitus in R ear in the past few weeks. Had hearing test recently at work and reports it was unremarkable. Used to work with loud equipment. Also used to be in [employer] prior to ear protection. No new medications. No vertigo or imbalance. Tinnitus almost constant, at times worse. # Goes to [facility]madin Clinic for INR management. He is on Coumadin for DVT in the setting of Factor V Leiden. # Takes high dose Lisinopril/HCTZ. BP today 128/84. No CP, SOB, palpitations, dizziness, or light-headedness. # Was taking Gabapentin for DDD. OARRS reviewed. Also when he has spasms takes Flexeril PRN only. # Has carpal tunnel R > L. Recently worsening. Does not want surgery done yet until end of this year due to work restrictions. R hand dominant. # UTD on Shingrix. # UTD on Pneumococcal vaccination.        Health maintenance:   Health Maintenance Due   Topic Date Due    Colorectal Cancer Screen  Never done    COVID-19 Vaccine (3 - Booster for Pfizer series) 09/15/2021         Review of Systems:  Constitutional: no fevers, no chills, no night sweats, no weight loss, no weight gain, no fatigue   Pain assessment: no pain  Head: no headaches  Ears: no hearing loss, R tinnitus, no vertigo  Eyes: no blurry vision, no diplopia, no dryness, no itchiness  Mouth: no oral ulcers, no dry mouth, no sore throat  Nose: no nasal congestion, no epistaxis  Cardiac: no chest pain, no palpitations, no leg swelling, no orthopnea, no PND, no syncope  Pulmonary: no dyspnea, no cough, no wheezing, no hemoptysis  GI: no nausea, no vomiting, no diarrhea, no constipation, no abdominal pain, no hematochezia  : no dysuria, no frequency, no urgency, no hematuria, no frothy urine  MSK: no arthralgias, no myalgias, no early morning stiffness, no Raynaud's   Neuro: no focal neurological deficits, no seizures  Sleep: no snoring, no daytime somnolence   Psych: no depression, no anxiety, no suicidal ideation      Physical Exam:  VITALS:   /84 (Site: Left Upper Arm, Position: Sitting, Cuff Size: Large Adult)   Pulse 81   Resp 20   Ht 6' 1\" (1.854 m)   Wt 270 lb (122.5 kg)   SpO2 97%   BMI 35.62 kg/m²     PHYSICAL EXAMINATION:  General: alert, awake, and oriented to time, place, person, and situation. Not in acute distress   Skin:  no suspicious rashes, no jaundice  Head: normocephalic/atraumatic  Eyes: anicteric sclera, well-injected conjunctiva. Pupils are equally round and reactive to light. Extraocular movements are intact   Nose: no septal deviation evident  Sinuses: no sinus tenderness  Ears: external ears normal. R TM bulging mildly   Neck: supple, no cervical lymphadenopathy, thyroid symmetric and not enlarged, no bruits   Heart: regular rate and rhythm, regular S1/S2, no S3/S4, no audible murmurs, no audible friction rub  Lungs: clear to auscultation bilaterally, no audible crackles, no audible wheezes, no audible rhonchi    Abdomen: normal bowel sounds, soft abdomen, non-tender, no palpable masses  Extremities: trace pedal edema BLE with varicose veins, warm, no cyanosis, no clubbing. Good capillary refill    MSK: no tenderness across spinous processes, full ROM in all 4 extremities.  No joint swelling or tenderness Peripheral vascular: 2+ pulses symmetric (radial)  Neuro: gait normal, CN II-XII intact, motor power 5/5 in all 4 extremities, sensation intact and symmetric    Labs   I have personally reviewed labs, and discussed pertinent findings with patient on this date 5/2/2022     Imaging   I have personally reviewed imaging, and discussed pertinent findings with patient on this date 5/2/2022     Other notes  I have personally reviewed other notes, and discussed pertinent findings with patient on this date 5/2/2022       Assessment/Plan:     1. Essential hypertension  Stable  Continue Lisinopril/HCTZ 20/25mg BID  Wants to hold off labs until wellness check end of this year    2. Dyslipidemia  ,  on 11/13/2021  Not on statin   Wants to hold off labs until wellness check end of this year    3. Tinnitus, right ear  Trial decongestant for possible eustachian tube dysfunction. Short course only. If no improvement, may need Audiology referral   - loratadine-pseudoephedrine (CLARITIN-D 24 HOUR)  MG per extended release tablet; Take 1 tablet by mouth daily  Dispense: 3 tablet; Refill: 0  - fluticasone (FLONASE) 50 MCG/ACT nasal spray; 1 spray by Each Nostril route daily  Dispense: 16 g; Refill: 0      Care discussed with patient and questions answered. Patient verbalizes understanding and agrees with plan. Discussed with patient the importance of continuity of care. I encouraged patient to schedule next appointment within 6 months with me. Patient prefers to be reached by Phone call at [phone] for future medical correspondence. Encouraged to activate [employer]. I reviewed and reconciled the medications this visit. I reviewed and updated the past medical, surgical, social, and family history during this visit. After visit summary provided.        Ezequiel Rosa MD  Internal Medicine  5/2/2022   5:20 PM

## 2022-05-09 ENCOUNTER — TELEPHONE (OUTPATIENT)
Dept: INTERNAL MEDICINE CLINIC | Age: 62
End: 2022-05-09

## 2022-05-09 RX ORDER — LORATADINE 10 MG/1
10 TABLET ORAL DAILY
Qty: 90 TABLET | Refills: 1 | Status: SHIPPED | OUTPATIENT
Start: 2022-05-09 | End: 2022-09-16

## 2022-05-24 ENCOUNTER — ANTI-COAG VISIT (OUTPATIENT)
Dept: PHARMACY | Age: 62
End: 2022-05-24
Payer: COMMERCIAL

## 2022-05-24 DIAGNOSIS — D68.2 FACTOR V DEFICIENCY (HCC): Primary | ICD-10-CM

## 2022-05-24 PROCEDURE — 36416 COLLJ CAPILLARY BLOOD SPEC: CPT

## 2022-05-24 PROCEDURE — 85610 PROTHROMBIN TIME: CPT

## 2022-05-24 PROCEDURE — 99211 OFF/OP EST MAY X REQ PHY/QHP: CPT

## 2022-05-24 NOTE — PROGRESS NOTES
Medication Management Service  [facility]  [phone]    Visit Date: 5/24/2022   Subjective:       Suhail Brown is a 65 y.o. male who presents to clinic today for anticoagulation monitoring and adjustment. Patient seen in clinic for warfarin management due to  Indication:   factor V Leiden mutation. INR goal: of 2.0-3.0. Duration of therapy: indefinite. Patient reports the following:   Adherent with regimen  Missed or extra doses:  Accidentally took warfarin twice on Saturday 5/14 - held 5/15  Bleeding or thromboembolic side effects:  None  Significant medication changes:  None  Significant dietary changes: None  Significant alcohol or tobacco changes: None  Significant recent illness, disease state changes, or hospitalization:  None  Upcoming surgeries or procedures:  None  Falls: None           Assessment and Plan     PT/INR done in office per protocol. INR today is 2.3, therapeutic. Plan: Will continue current regimen of warfarin 7.5mg daily. Recheck INR in 7 week(s). Patient verbalized understanding of dosing directions and information discussed. Dosing schedule given to patient including phone number, appointment date, and time. Progress note sent to referring office. Patient acknowledges working in consult agreement with pharmacist as referred by his/her physician.       Electronically signed by GALDINO Turner [facility] on 5/24/22 at 10:28 AM EDT    For Pharmacy Admin Tracking Only     Total # of Interventions Recommended: 0   Total # of Interventions Accepted: 0   Time Spent (min): 15

## 2022-06-27 DIAGNOSIS — H93.11 TINNITUS, RIGHT EAR: ICD-10-CM

## 2022-06-27 RX ORDER — FLUTICASONE PROPIONATE 50 MCG
SPRAY, SUSPENSION (ML) NASAL
Qty: 1 EACH | Refills: 5 | Status: SHIPPED | OUTPATIENT
Start: 2022-06-27 | End: 2022-07-25 | Stop reason: SDUPTHER

## 2022-06-27 NOTE — TELEPHONE ENCOUNTER
Medication:   Requested Prescriptions     Pending Prescriptions Disp Refills    fluticasone (FLONASE) 50 MCG/ACT nasal spray [Pharmacy Med Name: FLUTICASONE PROP 50 MCG SPRAY]       Sig: SPRAY 1 SPRAY INTO EACH NOSTRIL EVERY DAY        Last Filled:      Patient Phone Number: [phone] (home)     Last appt: 5/2/2022   Next appt: 10/24/2022    Last OARRS:   RX Monitoring 7/17/2017   Attestation The Prescription Monitoring Report for this patient was reviewed today. Periodic Controlled Substance Monitoring Possible medication side effects, risk of tolerance and/or dependence, and alternative treatments discussed; No signs of potential drug abuse or diversion identified. ;Medication contract signed today

## 2022-06-28 ENCOUNTER — TELEPHONE (OUTPATIENT)
Dept: PHARMACY | Age: 62
End: 2022-06-28

## 2022-07-12 ENCOUNTER — ANTI-COAG VISIT (OUTPATIENT)
Dept: PHARMACY | Age: 62
End: 2022-07-12
Payer: COMMERCIAL

## 2022-07-12 DIAGNOSIS — D68.2 FACTOR V DEFICIENCY (HCC): Primary | ICD-10-CM

## 2022-07-12 LAB
INR BLD: 2.4
POC INR: 2.4 INDEX
PROTHROMBIN TIME, POC: 28.9 SECONDS (ref 10–14.3)
PROTIME: 28.9 SECONDS

## 2022-07-12 PROCEDURE — 85610 PROTHROMBIN TIME: CPT

## 2022-07-12 PROCEDURE — 99211 OFF/OP EST MAY X REQ PHY/QHP: CPT

## 2022-07-12 PROCEDURE — 36416 COLLJ CAPILLARY BLOOD SPEC: CPT

## 2022-07-12 NOTE — PROGRESS NOTES
Medication Management Service  [facility]  [phone]    Visit Date: 7/12/2022   Subjective:       Vanessa Noble is a 65 y.o. male who presents to clinic today for anticoagulation monitoring and adjustment. Patient seen in clinic for warfarin management due to  Indication:   factor V Leiden mutation. INR goal: of 2.0-3.0. Duration of therapy: indefinite. Patient reports the following:   Adherent with regimen  Missed or extra doses:  None   Bleeding or thromboembolic side effects:  None  Significant medication changes:  None  Significant dietary changes: None  Significant alcohol or tobacco changes: None  Significant recent illness, disease state changes, or hospitalization:  None  Upcoming surgeries or procedures:  None  Falls: None           Assessment and Plan     PT/INR done in office per protocol. INR today is 2.4, therapeutic. Plan: Will continue current regimen of warfarin 7.5mg daily. Recheck INR in 8 week(s). Patient verbalized understanding of dosing directions and information discussed. Dosing schedule given to patient including phone number, appointment date, and time. Progress note sent to referring office. Patient acknowledges working in consult agreement with pharmacist as referred by his/her physician.       Electronically signed by Jena Armstrong [address] [place] [address] on 7/12/22 at 10:39 AM EDT    For Pharmacy Admin Tracking Only     Total # of Interventions Recommended: 0   Total # of Interventions Accepted: 0   Time Spent (min): 15

## 2022-07-20 NOTE — TELEPHONE ENCOUNTER
Patient left message requesting Nkechi's opinion and more info on \"Relief Factor\". Returned call and advised the turmeric and fish oil in it could increase his INR. He does not plan to take it at this time.      For Pharmacy Admin Tracking Only     Intervention Detail:    Total # of Interventions Recommended:    Total # of Interventions Accepted:    Time Spent (min): 5 Lisset Vazquez(Attending)

## 2022-07-25 DIAGNOSIS — H93.11 TINNITUS, RIGHT EAR: ICD-10-CM

## 2022-07-25 RX ORDER — FLUTICASONE PROPIONATE 50 MCG
SPRAY, SUSPENSION (ML) NASAL
Qty: 1 EACH | Refills: 5 | Status: SHIPPED | OUTPATIENT
Start: 2022-07-25

## 2022-08-23 ENCOUNTER — TELEPHONE (OUTPATIENT)
Dept: PHARMACY | Age: 62
End: 2022-08-23

## 2022-08-23 DIAGNOSIS — D68.2 FACTOR V DEFICIENCY (HCC): ICD-10-CM

## 2022-08-23 DIAGNOSIS — D68.51 FACTOR V LEIDEN MUTATION (HCC): Primary | ICD-10-CM

## 2022-08-23 NOTE — TELEPHONE ENCOUNTER
Annual referral renewal. Referring provider is Dr. Elizabeth Lala. New referral pended in this encounter. Message sent to provider requesting to sign.     For Pharmacy Admin Tracking Only    Time Spent (min): 5

## 2022-09-02 ENCOUNTER — TELEPHONE (OUTPATIENT)
Dept: PHARMACY | Age: 62
End: 2022-09-02

## 2022-09-02 NOTE — TELEPHONE ENCOUNTER
Patient left  requesting to reschedule. Rescheduled for Tuesday 9/13.     For Pharmacy Admin Tracking Only    Time Spent (min): 5

## 2022-09-06 ENCOUNTER — APPOINTMENT (OUTPATIENT)
Dept: PHARMACY | Age: 62
End: 2022-09-06
Payer: COMMERCIAL

## 2022-09-07 ENCOUNTER — TELEPHONE (OUTPATIENT)
Dept: INTERNAL MEDICINE CLINIC | Age: 62
End: 2022-09-07

## 2022-09-07 DIAGNOSIS — Z00.00 ROUTINE GENERAL MEDICAL EXAMINATION AT A HEALTH CARE FACILITY: Primary | ICD-10-CM

## 2022-09-07 NOTE — TELEPHONE ENCOUNTER
Patient contacted office requesting labs to be ordered as he has a physical scheduled for next week and would like to go to the hospital this weekend to get his labs completed. The physical is a well physical for his job. Patient would like us to call him on his cell to let him know that the labs have been ordered. He states it is ok to leave a voicemail if he does not answer since he is at work. [phone].

## 2022-09-10 ENCOUNTER — HOSPITAL ENCOUNTER (OUTPATIENT)
Age: 62
Discharge: HOME | End: 2022-09-10
Payer: COMMERCIAL

## 2022-09-10 LAB
ALBUMIN SERPL-MCNC: 4.3 GM/DL (ref 3.4–5)
ALP BLD-CCNC: 69 IU/L (ref 40–129)
ALT SERPL-CCNC: 17 U/L (ref 10–40)
ANION GAP SERPL CALCULATED.3IONS-SCNC: 18 MMOL/L (ref 4–16)
AST SERPL-CCNC: 26 IU/L (ref 15–37)
BASOPHILS ABSOLUTE: 0 K/CU MM
BASOPHILS RELATIVE PERCENT: 0.2 % (ref 0–1)
BILIRUB SERPL-MCNC: 0.7 MG/DL (ref 0–1)
BUN BLDV-MCNC: 15 MG/DL (ref 6–23)
CALCIUM SERPL-MCNC: 9.4 MG/DL (ref 8.3–10.6)
CHLORIDE BLD-SCNC: 101 MMOL/L (ref 99–110)
CHOLESTEROL, FASTING: 177 MG/DL
CO2: 21 MMOL/L (ref 21–32)
CREAT SERPL-MCNC: 1 MG/DL (ref 0.9–1.3)
DIFFERENTIAL TYPE: ABNORMAL
EOSINOPHILS ABSOLUTE: 0.1 K/CU MM
EOSINOPHILS RELATIVE PERCENT: 1.1 % (ref 0–3)
GFR AFRICAN AMERICAN: >60 ML/MIN/1.73M2
GFR NON-AFRICAN AMERICAN: >60 ML/MIN/1.73M2
GLUCOSE BLD-MCNC: 97 MG/DL (ref 70–99)
HCT VFR BLD CALC: 47.5 % (ref 42–52)
HDLC SERPL-MCNC: 49 MG/DL
HEMOGLOBIN: 16.2 GM/DL (ref 13.5–18)
IMMATURE NEUTROPHIL %: 0.3 % (ref 0–0.43)
LDL CHOLESTEROL CALCULATED: 100 MG/DL
LYMPHOCYTES ABSOLUTE: 1.9 K/CU MM
LYMPHOCYTES RELATIVE PERCENT: 20.9 % (ref 24–44)
MCH RBC QN AUTO: 30.6 PG (ref 27–31)
MCHC RBC AUTO-ENTMCNC: 34.1 % (ref 32–36)
MCV RBC AUTO: 89.6 FL (ref 78–100)
MONOCYTES ABSOLUTE: 0.8 K/CU MM
MONOCYTES RELATIVE PERCENT: 8.9 % (ref 0–4)
PDW BLD-RTO: 12.9 % (ref 11.7–14.9)
PLATELET # BLD: 211 K/CU MM (ref 140–440)
PMV BLD AUTO: 9.1 FL (ref 7.5–11.1)
POTASSIUM SERPL-SCNC: 4.6 MMOL/L (ref 3.5–5.1)
PROSTATE SPECIFIC ANTIGEN: 0.62 NG/ML (ref 0–4)
RBC # BLD: 5.3 M/CU MM (ref 4.6–6.2)
SEGMENTED NEUTROPHILS ABSOLUTE COUNT: 6.2 K/CU MM
SEGMENTED NEUTROPHILS RELATIVE PERCENT: 68.6 % (ref 36–66)
SODIUM BLD-SCNC: 140 MMOL/L (ref 135–145)
TOTAL IMMATURE NEUTOROPHIL: 0.03 K/CU MM
TOTAL PROTEIN: 7 GM/DL (ref 6.4–8.2)
TRIGLYCERIDE, FASTING: 138 MG/DL
WBC # BLD: 9.1 K/CU MM (ref 4–10.5)

## 2022-09-10 PROCEDURE — 80053 COMPREHEN METABOLIC PANEL: CPT

## 2022-09-10 PROCEDURE — 85025 COMPLETE CBC W/AUTO DIFF WBC: CPT

## 2022-09-10 PROCEDURE — G0103 PSA SCREENING: HCPCS

## 2022-09-10 PROCEDURE — 36415 COLL VENOUS BLD VENIPUNCTURE: CPT

## 2022-09-10 PROCEDURE — 80061 LIPID PANEL: CPT

## 2022-09-13 ENCOUNTER — ANTI-COAG VISIT (OUTPATIENT)
Dept: PHARMACY | Age: 62
End: 2022-09-13
Payer: COMMERCIAL

## 2022-09-13 DIAGNOSIS — D68.2 FACTOR V DEFICIENCY (HCC): Primary | ICD-10-CM

## 2022-09-13 PROCEDURE — 36416 COLLJ CAPILLARY BLOOD SPEC: CPT

## 2022-09-13 PROCEDURE — 99211 OFF/OP EST MAY X REQ PHY/QHP: CPT

## 2022-09-13 PROCEDURE — 85610 PROTHROMBIN TIME: CPT

## 2022-09-13 NOTE — PROGRESS NOTES
Medication Management Service  [facility]  [phone]    Visit Date: 9/13/2022   Subjective:       Violeta Yañez is a 65 y.o. male who presents to clinic today for anticoagulation monitoring and adjustment. Patient seen in clinic for warfarin management due to  Indication:   factor V Leiden mutation. INR goal: of 2.0-3.0. Duration of therapy: indefinite. Patient reports the following:   Adherent with regimen  Missed or extra doses:  None   Bleeding or thromboembolic side effects:  None  Significant medication changes:  None  Significant dietary changes: None  Significant alcohol or tobacco changes: None  Significant recent illness, disease state changes, or hospitalization:  None  Upcoming surgeries or procedures:  None  Falls: None           Assessment and Plan     PT/INR done in office per protocol. INR today is 2.4, therapeutic. Plan: Will continue current regimen of warfarin 7.5mg daily. Recheck INR in 8 week(s). Patient verbalized understanding of dosing directions and information discussed. Dosing schedule given to patient including phone number, appointment date, and time. Progress note sent to referring office. Patient acknowledges working in consult agreement with pharmacist as referred by his/her physician.       Electronically signed by Terence Huang [facility] on 9/13/22 at 9:51 AM EDT    For Pharmacy Admin Tracking Only    Total # of Interventions Recommended: 0  Total # of Interventions Accepted: 0  Time Spent (min): 15

## 2022-09-16 ENCOUNTER — OFFICE VISIT (OUTPATIENT)
Dept: INTERNAL MEDICINE CLINIC | Age: 62
End: 2022-09-16
Payer: COMMERCIAL

## 2022-09-16 VITALS
SYSTOLIC BLOOD PRESSURE: 132 MMHG | WEIGHT: 256.4 LBS | DIASTOLIC BLOOD PRESSURE: 76 MMHG | RESPIRATION RATE: 18 BRPM | BODY MASS INDEX: 33.98 KG/M2 | HEART RATE: 82 BPM | OXYGEN SATURATION: 96 % | HEIGHT: 73 IN

## 2022-09-16 DIAGNOSIS — D68.51 FACTOR V LEIDEN MUTATION (HCC): ICD-10-CM

## 2022-09-16 DIAGNOSIS — E78.5 DYSLIPIDEMIA: ICD-10-CM

## 2022-09-16 DIAGNOSIS — I10 ESSENTIAL HYPERTENSION: ICD-10-CM

## 2022-09-16 DIAGNOSIS — Z00.00 ROUTINE GENERAL MEDICAL EXAMINATION AT A HEALTH CARE FACILITY: Primary | ICD-10-CM

## 2022-09-16 PROCEDURE — 99396 PREV VISIT EST AGE 40-64: CPT | Performed by: NURSE PRACTITIONER

## 2022-09-16 ASSESSMENT — ENCOUNTER SYMPTOMS
BLOOD IN STOOL: 0
CONSTIPATION: 0
CHEST TIGHTNESS: 0
COLOR CHANGE: 0
BACK PAIN: 0
DIARRHEA: 0
RHINORRHEA: 0
VOICE CHANGE: 0
WHEEZING: 0
SINUS PAIN: 0
TROUBLE SWALLOWING: 0
SHORTNESS OF BREATH: 0
CHOKING: 0
SINUS PRESSURE: 0

## 2022-09-16 NOTE — PROGRESS NOTES
Ernestine Byrd (:  1960) is a 65 y.o. male,Established patient, here for evaluation of the following chief complaint(s):        SUBJECTIVE/OBJECTIVE:  Here for annual wellness - had appt with PCP but she is out today - requesting PE - is due next month for employer - no paperwork needed - denies need of any medication refills - states he has 2 colonoscopies in the past - states he is unsure when he is due again - last one at least 5 years ago. Takes coumadin for factor 5  and is managed by coumadin clinic. Has routine labs completed recently. Denies any acute concerns. Allergies   Allergen Reactions    No Known Allergies         Current Outpatient Medications   Medication Sig Dispense Refill    fluticasone (FLONASE) 50 MCG/ACT nasal spray SPRAY 1 SPRAY INTO EACH NOSTRIL EVERY DAY 1 each 5    lisinopril-hydroCHLOROthiazide (PRINZIDE;ZESTORETIC) 20-25 MG per tablet Take 1 tablet by mouth 2 times daily 180 tablet 1    warfarin (COUMADIN) 7.5 MG tablet Take 1 tablet by mouth daily 90 tablet 2    triamcinolone (KENALOG) 0.1 % cream Apply topically 2 times daily as needed       B-Complex TABS Take 1 tablet by mouth daily       No current facility-administered medications for this visit. Review of Systems   Constitutional:  Negative for activity change, appetite change, chills, diaphoresis, fever and unexpected weight change. HENT:  Negative for dental problem, ear pain, hearing loss, nosebleeds, rhinorrhea, sinus pressure, sinus pain, sneezing, trouble swallowing and voice change. Eyes:  Negative for visual disturbance. Wears glasses    Respiratory:  Negative for choking, chest tightness, shortness of breath and wheezing. Cardiovascular:  Negative for palpitations and leg swelling. Gastrointestinal:  Negative for blood in stool, constipation and diarrhea. Endocrine: Negative for cold intolerance, heat intolerance, polydipsia, polyphagia and polyuria.    Genitourinary:  Negative for decreased urine volume, difficulty urinating, penile pain, penile swelling, scrotal swelling, testicular pain and urgency. Musculoskeletal:  Negative for back pain, gait problem and neck stiffness. Skin:  Negative for color change. Neurological:  Negative for dizziness, tremors, syncope, speech difficulty and light-headedness. Psychiatric/Behavioral:  Negative for agitation, confusion, dysphoric mood, sleep disturbance and suicidal ideas. The patient is not nervous/anxious. /76   Pulse 82   Resp 18   Ht 6' 1\" (1.854 m)   Wt 256 lb 6.4 oz (116.3 kg)   SpO2 96%   BMI 33.83 kg/m²      Physical Exam  Vitals reviewed. Constitutional:       General: He is not in acute distress. Appearance: He is obese. HENT:      Head: Normocephalic and atraumatic. Right Ear: Tympanic membrane, ear canal and external ear normal. There is no impacted cerumen. Left Ear: Tympanic membrane, ear canal and external ear normal. There is no impacted cerumen. Nose: Nose normal. No congestion or rhinorrhea. Mouth/Throat:      Mouth: Mucous membranes are moist.      Pharynx: Oropharynx is clear. No oropharyngeal exudate or posterior oropharyngeal erythema. Eyes:      General: No scleral icterus. Extraocular Movements: Extraocular movements intact. Conjunctiva/sclera: Conjunctivae normal.      Pupils: Pupils are equal, round, and reactive to light. Neck:      Vascular: No carotid bruit. Cardiovascular:      Rate and Rhythm: Normal rate and regular rhythm. Pulses: Normal pulses. Heart sounds: Normal heart sounds. Pulmonary:      Effort: Pulmonary effort is normal.      Breath sounds: Normal breath sounds. Abdominal:      General: Abdomen is flat. Bowel sounds are normal. There is no distension. Palpations: Abdomen is soft. There is no mass. Tenderness: There is no abdominal tenderness. There is no right CVA tenderness, left CVA tenderness, guarding or rebound. Hernia: No hernia is present. Musculoskeletal:         General: No swelling, tenderness, deformity or signs of injury. Cervical back: Neck supple. Right lower leg: No edema. Left lower leg: No edema. Lymphadenopathy:      Cervical: No cervical adenopathy. Skin:     General: Skin is warm and dry. Capillary Refill: Capillary refill takes less than 2 seconds. Neurological:      General: No focal deficit present. Mental Status: He is alert and oriented to person, place, and time. Psychiatric:         Mood and Affect: Mood normal.         Thought Content: Thought content normal.       ASSESSMENT/PLAN:  1. Routine general medical examination at a health care facility               2. Dyslipidemia      Eat heart-healthy foods. Eat fruits, vegetables, whole grains, beans, and other high-fiber foods. Eat lean proteins, such as seafood, lean meats, beans, nuts, and soy products. Eat healthy fats, such as canola and olive oil. Choose foods that are low in saturated fat. Limit sodium and alcohol. Limit drinks and foods with added sugar. Increase exercise     3. Essential hypertension  Continue Prinzide 20-25mg BID     4. Factor V Leiden mutation ([facility] [address]  Continue warfarin 7.5 mg PO daily   Continue to follow with coumadin clinic     Return in about 6 months (around 3/16/2023). An electronic signature was used to authenticate this note.     --CHRISTINE Mcelroy - CNP

## 2022-09-16 NOTE — PATIENT INSTRUCTIONS
Eat heart-healthy foods. Eat fruits, vegetables, whole grains, beans, and other high-fiber foods. Eat lean proteins, such as seafood, lean meats, beans, nuts, and soy products. Eat healthy fats, such as canola and olive oil. Choose foods that are low in saturated fat. Limit sodium and alcohol. Limit drinks and foods with added sugar.

## 2022-11-08 ENCOUNTER — ANTI-COAG VISIT (OUTPATIENT)
Dept: PHARMACY | Age: 62
End: 2022-11-08
Payer: COMMERCIAL

## 2022-11-08 DIAGNOSIS — D68.2 FACTOR V DEFICIENCY (HCC): Primary | ICD-10-CM

## 2022-11-08 LAB
INR BLD: 2.8
POC INR: 2.8 INDEX
PROTHROMBIN TIME, POC: 33.6 SECONDS (ref 10–14.3)
PROTIME: 33.6 SECONDS

## 2022-11-08 PROCEDURE — 85610 PROTHROMBIN TIME: CPT

## 2022-11-08 PROCEDURE — 99211 OFF/OP EST MAY X REQ PHY/QHP: CPT

## 2022-11-08 PROCEDURE — 36416 COLLJ CAPILLARY BLOOD SPEC: CPT

## 2022-11-08 NOTE — PROGRESS NOTES
Medication Management Service  [facility]  [phone]    Visit Date: 11/8/2022   Subjective:       Kerstin Angelucci is a 65 y.o. male who presents to clinic today for anticoagulation monitoring and adjustment. Patient seen in clinic for warfarin management due to  Indication:   factor V Leiden mutation. INR goal: of 2.0-3.0. Duration of therapy: indefinite. Patient reports the following:   Adherent with regimen  Missed or extra doses:  None   Bleeding or thromboembolic side effects:  None  Significant medication changes:  None  Significant dietary changes: None  Significant alcohol or tobacco changes: None  Significant recent illness, disease state changes, or hospitalization:  None  Upcoming surgeries or procedures:  None  Falls: None           Assessment and Plan     PT/INR done in office per protocol. INR today is 2.8, therapeutic. Plan: Will continue current regimen of warfarin 7.5mg daily. Recheck INR in 10 week(s). Patient verbalized understanding of dosing directions and information discussed. Dosing schedule given to patient including phone number, appointment date, and time. Progress note sent to referring office. Patient acknowledges working in consult agreement with pharmacist as referred by his/her physician.       Electronically signed by Esha Saldaña, [address] [place] [address] on 11/8/22 at 10:10 AM EST    For Pharmacy Admin Tracking Only    Total # of Interventions Recommended: 0  Total # of Interventions Accepted: 0  Time Spent (min): 15

## 2022-12-01 DIAGNOSIS — I10 ESSENTIAL HYPERTENSION: ICD-10-CM

## 2022-12-01 RX ORDER — LISINOPRIL AND HYDROCHLOROTHIAZIDE 25; 20 MG/1; MG/1
1 TABLET ORAL 2 TIMES DAILY
Qty: 180 TABLET | Refills: 1 | OUTPATIENT
Start: 2022-12-01

## 2022-12-05 RX ORDER — WARFARIN SODIUM 7.5 MG/1
TABLET ORAL
Qty: 90 TABLET | Refills: 3 | Status: SHIPPED | OUTPATIENT
Start: 2022-12-05

## 2022-12-12 DIAGNOSIS — I10 ESSENTIAL HYPERTENSION: ICD-10-CM

## 2022-12-12 RX ORDER — LISINOPRIL AND HYDROCHLOROTHIAZIDE 25; 20 MG/1; MG/1
1 TABLET ORAL 2 TIMES DAILY
Qty: 180 TABLET | Refills: 1 | Status: SHIPPED | OUTPATIENT
Start: 2022-12-12

## 2023-01-17 ENCOUNTER — ANTI-COAG VISIT (OUTPATIENT)
Dept: PHARMACY | Age: 63
End: 2023-01-17
Payer: COMMERCIAL

## 2023-01-17 DIAGNOSIS — D68.2 FACTOR V DEFICIENCY (HCC): Primary | ICD-10-CM

## 2023-01-17 LAB
INR BLD: 3.4
POC INR: 3.4 INDEX
PROTHROMBIN TIME, POC: 40.8 SECONDS (ref 10–14.3)
PROTIME: 40.8 SECONDS

## 2023-01-17 PROCEDURE — 85610 PROTHROMBIN TIME: CPT

## 2023-01-17 PROCEDURE — 99212 OFFICE O/P EST SF 10 MIN: CPT

## 2023-01-17 PROCEDURE — 36416 COLLJ CAPILLARY BLOOD SPEC: CPT

## 2023-01-17 NOTE — PROGRESS NOTES
Medication Management Service  [facility]  [phone]    Visit Date: 1/17/2023   Subjective:       Abigail Dewitt is a 59 y.o. male who presents to clinic today for anticoagulation monitoring and adjustment. Patient seen in clinic for warfarin management due to  Indication:   factor V Leiden mutation. INR goal: of 2.0-3.0. Duration of therapy: indefinite. Patient reports the following:   Adherent with regimen  Missed or extra doses:  None   Bleeding or thromboembolic side effects:  None  Significant medication changes:  None  Significant dietary changes: None  Significant alcohol or tobacco changes: Increased alcohol intake due to New Years and birthday - likely cause of INR elevation  Significant recent illness, disease state changes, or hospitalization:  None  Upcoming surgeries or procedures:  None  Falls: None           Assessment and Plan     PT/INR done in office per protocol. INR today is 3.4, supratherapeutic. Patient advised of increased risk of bleeding at current INR. Advised patient to avoid activities that increase risk of falling or cutting him/herself. Advised patient to go to ER for fall, head injury, or bleeding. Patient voiced understanding. Plan:  Take warfarin 5mg x 1 then will continue current regimen of warfarin 7.5mg daily. Recheck INR in 10 week(s). Patient verbalized understanding of dosing directions and information discussed. Dosing schedule given to patient including phone number, appointment date, and time. Progress note sent to referring office. Patient acknowledges working in consult agreement with pharmacist as referred by his/her physician.       Electronically signed by Sheryl Harry [address] [place] [address] on 1/17/23 at 8:23 AM EST    For Pharmacy Admin Tracking Only    Intervention Detail: Dose Adjustment: 1, reason: Therapy De-escalation  Total # of Interventions Recommended: 1  Total # of Interventions Accepted: 1  Time Spent (min): 15

## 2023-03-28 ENCOUNTER — ANTI-COAG VISIT (OUTPATIENT)
Dept: PHARMACY | Age: 63
End: 2023-03-28
Payer: COMMERCIAL

## 2023-03-28 DIAGNOSIS — D68.2 FACTOR V DEFICIENCY (HCC): Primary | ICD-10-CM

## 2023-03-28 LAB
INR BLD: 1.9
POC INR: 1.9 INDEX
PROTHROMBIN TIME, POC: 23.2 SECONDS (ref 10–14.3)
PROTIME: 23.2 SECONDS

## 2023-03-28 PROCEDURE — 85610 PROTHROMBIN TIME: CPT

## 2023-03-28 PROCEDURE — 36416 COLLJ CAPILLARY BLOOD SPEC: CPT

## 2023-03-28 PROCEDURE — 99211 OFF/OP EST MAY X REQ PHY/QHP: CPT

## 2023-03-28 NOTE — PROGRESS NOTES
Medication Management Service  [facility]  [phone]    Visit Date: 3/28/2023   Subjective:       Mykel Quan is a 59 y.o. male who presents to clinic today for anticoagulation monitoring and adjustment. Patient seen in clinic for warfarin management due to  Indication:   factor V Leiden mutation. INR goal: of 2.0-3.0. Duration of therapy: indefinite. Patient reports the following:   Adherent with regimen  Missed or extra doses:  Possible missed doses  Bleeding or thromboembolic side effects:  None  Significant medication changes:  None  Significant dietary changes: None  Significant alcohol or tobacco changes: None  Significant recent illness, disease state changes, or hospitalization:  None  Upcoming surgeries or procedures:  None  Falls: None           Assessment and Plan     PT/INR done in office per protocol. INR today is 1.9, slightly below goal range. Plan: Will continue current regimen of warfarin 7.5mg daily. Recheck INR in 10 week(s). Patient verbalized understanding of dosing directions and information discussed. Dosing schedule given to patient including phone number, appointment date, and time. Progress note sent to referring office. Patient acknowledges working in consult agreement with pharmacist as referred by his/her physician.       Electronically signed by Rola White [facility] on 3/28/23 at 11:20 AM EDT    For Pharmacy Admin Tracking Only    Total # of Interventions Recommended: 0  Total # of Interventions Accepted: 0  Time Spent (min): 15

## 2023-05-02 DIAGNOSIS — I10 ESSENTIAL HYPERTENSION: ICD-10-CM

## 2023-05-02 RX ORDER — LISINOPRIL AND HYDROCHLOROTHIAZIDE 25; 20 MG/1; MG/1
1 TABLET ORAL 2 TIMES DAILY
Qty: 180 TABLET | Refills: 1 | Status: SHIPPED | OUTPATIENT
Start: 2023-05-02

## 2023-05-26 ENCOUNTER — HOSPITAL ENCOUNTER (OUTPATIENT)
Age: 63
Discharge: HOME | End: 2023-05-26
Payer: COMMERCIAL

## 2023-05-26 ENCOUNTER — OFFICE VISIT (OUTPATIENT)
Dept: INTERNAL MEDICINE CLINIC | Age: 63
End: 2023-05-26
Payer: COMMERCIAL

## 2023-05-26 VITALS
TEMPERATURE: 98 F | BODY MASS INDEX: 33.93 KG/M2 | OXYGEN SATURATION: 97 % | HEIGHT: 73 IN | HEART RATE: 97 BPM | WEIGHT: 256 LBS

## 2023-05-26 DIAGNOSIS — E79.0 ELEVATED URIC ACID IN BLOOD: ICD-10-CM

## 2023-05-26 DIAGNOSIS — M79.671 RIGHT FOOT PAIN: Primary | ICD-10-CM

## 2023-05-26 DIAGNOSIS — J34.9 SINUS PROBLEM: ICD-10-CM

## 2023-05-26 LAB — URATE SERPL-MCNC: 8.3 MG/DL (ref 3.5–7.2)

## 2023-05-26 PROCEDURE — 99213 OFFICE O/P EST LOW 20 MIN: CPT | Performed by: PHYSICIAN ASSISTANT

## 2023-05-26 PROCEDURE — 36415 COLL VENOUS BLD VENIPUNCTURE: CPT

## 2023-05-26 PROCEDURE — 84550 ASSAY OF BLOOD/URIC ACID: CPT

## 2023-05-26 RX ORDER — METHYLPREDNISOLONE ACETATE 40 MG/ML
40 INJECTION, SUSPENSION INTRA-ARTICULAR; INTRALESIONAL; INTRAMUSCULAR; SOFT TISSUE ONCE
Status: COMPLETED | OUTPATIENT
Start: 2023-05-26 | End: 2023-05-26

## 2023-05-26 RX ORDER — PREDNISONE 10 MG/1
TABLET ORAL
Qty: 26 TABLET | Refills: 0 | Status: SHIPPED | OUTPATIENT
Start: 2023-05-26

## 2023-05-26 RX ORDER — AMOXICILLIN AND CLAVULANATE POTASSIUM 875; 125 MG/1; MG/1
1 TABLET, FILM COATED ORAL 2 TIMES DAILY
Qty: 20 TABLET | Refills: 0 | Status: SHIPPED | OUTPATIENT
Start: 2023-05-26 | End: 2023-06-05

## 2023-05-26 RX ADMIN — METHYLPREDNISOLONE ACETATE 40 MG: 40 INJECTION, SUSPENSION INTRA-ARTICULAR; INTRALESIONAL; INTRAMUSCULAR; SOFT TISSUE at 11:11

## 2023-05-26 ASSESSMENT — ENCOUNTER SYMPTOMS
RHINORRHEA: 0
COLOR CHANGE: 1
ABDOMINAL PAIN: 0
VOMITING: 0
NAUSEA: 0
SORE THROAT: 1
SINUS PAIN: 1
CHEST TIGHTNESS: 0
SHORTNESS OF BREATH: 0
BLOOD IN STOOL: 0
EYE REDNESS: 0
BACK PAIN: 0
PHOTOPHOBIA: 0
CONSTIPATION: 0
WHEEZING: 0
COUGH: 0
SINUS PRESSURE: 1
EYE DISCHARGE: 0
DIARRHEA: 0
EYE PAIN: 0

## 2023-05-26 NOTE — RESULT ENCOUNTER NOTE
Reviewed uric acid level with patient via telephone, is elevated, possible gout or pseudogout flare; can try treating with a course of steroid, reviewed proper use, monitoring, side effects etc.  Report to emergency department if symptoms worsen or change etc.
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
## 2023-06-05 NOTE — PROGRESS NOTES
Medication Management Service  [facility]  [phone]    Visit Date: 6/6/2023   Subjective:       Donna Post is a 59 y.o. male who presents to clinic today for anticoagulation monitoring and adjustment. Patient seen in clinic for warfarin management due to  Indication:   factor V Leiden mutation. INR goal: of 2.0-3.0. Duration of therapy: indefinite. Patient reports the following:   Adherent with regimen  Missed or extra doses:  None   Bleeding or thromboembolic side effects:  None  Significant medication changes:  Augmentin, prednisone  Significant dietary changes: increased coleslaw when took antibiotic  Significant alcohol or tobacco changes: None  Significant recent illness, disease state changes, or hospitalization:  None  Upcoming surgeries or procedures:  None  Falls: None           Assessment and Plan     PT/INR done in office per protocol. INR today is 3.0, therapeutic. Requested he call our office if he starts any new medications for gout. He reports it has improved. He plans to try a tart cherry supplement  Plan: Will continue current regimen of warfarin 7.5mg daily. Recheck INR in 10 week(s). Patient verbalized understanding of dosing directions and information discussed. Dosing schedule given to patient including phone number, appointment date, and time. Progress note sent to referring office. Patient acknowledges working in consult agreement with pharmacist as referred by his/her physician. For Pharmacy Admin Tracking Only    Intervention Detail:   Total # of Interventions Recommended:    Total # of Interventions Accepted:   Time Spent (min): 15

## 2023-06-06 ENCOUNTER — TELEPHONE (OUTPATIENT)
Dept: PHARMACY | Age: 63
End: 2023-06-06

## 2023-06-06 ENCOUNTER — ANTI-COAG VISIT (OUTPATIENT)
Dept: PHARMACY | Age: 63
End: 2023-06-06
Payer: COMMERCIAL

## 2023-06-06 DIAGNOSIS — D68.2 FACTOR V DEFICIENCY (HCC): Primary | ICD-10-CM

## 2023-06-06 LAB
INTERNATIONAL NORMALIZATION RATIO, POC: 3
POC INR: 3 INDEX
PROTHROMBIN TIME, POC: 36.2 SECONDS (ref 10–14.3)

## 2023-06-06 PROCEDURE — 85610 PROTHROMBIN TIME: CPT

## 2023-06-06 PROCEDURE — 99211 OFF/OP EST MAY X REQ PHY/QHP: CPT

## 2023-06-06 PROCEDURE — 36416 COLLJ CAPILLARY BLOOD SPEC: CPT

## 2023-06-07 NOTE — TELEPHONE ENCOUNTER
Patient stated at last visit that he has a new PCP- needs new referral and CPA. Called office of Dr. Reyne Dakins- she is in office until 6/21 then is leaving for [facility]. Patient's new PCP Dr. Wells  will not be at the office until 7/10/23. For Pharmacy Admin Tracking Only    Intervention Detail:   Total # of Interventions Recommended:    Total # of Interventions Accepted:   Time Spent (min): 10

## 2023-06-09 ENCOUNTER — OFFICE VISIT (OUTPATIENT)
Dept: INTERNAL MEDICINE CLINIC | Age: 63
End: 2023-06-09
Payer: COMMERCIAL

## 2023-06-09 VITALS — DIASTOLIC BLOOD PRESSURE: 76 MMHG | SYSTOLIC BLOOD PRESSURE: 130 MMHG | OXYGEN SATURATION: 97 % | HEART RATE: 111 BPM

## 2023-06-09 DIAGNOSIS — M79.671 RIGHT FOOT PAIN: Primary | ICD-10-CM

## 2023-06-09 DIAGNOSIS — E79.0 HYPERURICEMIA: ICD-10-CM

## 2023-06-09 PROCEDURE — 96372 THER/PROPH/DIAG INJ SC/IM: CPT | Performed by: PHYSICIAN ASSISTANT

## 2023-06-09 PROCEDURE — 3078F DIAST BP <80 MM HG: CPT | Performed by: PHYSICIAN ASSISTANT

## 2023-06-09 PROCEDURE — 99213 OFFICE O/P EST LOW 20 MIN: CPT | Performed by: PHYSICIAN ASSISTANT

## 2023-06-09 PROCEDURE — 3075F SYST BP GE 130 - 139MM HG: CPT | Performed by: PHYSICIAN ASSISTANT

## 2023-06-09 RX ORDER — METHYLPREDNISOLONE ACETATE 40 MG/ML
40 INJECTION, SUSPENSION INTRA-ARTICULAR; INTRALESIONAL; INTRAMUSCULAR; SOFT TISSUE ONCE
Status: COMPLETED | OUTPATIENT
Start: 2023-06-09 | End: 2023-06-09

## 2023-06-09 RX ORDER — PROBENECID 500 MG/1
TABLET, FILM COATED ORAL
Qty: 60 TABLET | Refills: 1 | Status: SHIPPED | OUTPATIENT
Start: 2023-06-09

## 2023-06-09 RX ORDER — PREDNISONE 10 MG/1
TABLET ORAL
Qty: 26 TABLET | Refills: 0 | Status: SHIPPED | OUTPATIENT
Start: 2023-06-09

## 2023-06-09 RX ORDER — ALLOPURINOL 100 MG/1
100 TABLET ORAL DAILY
Qty: 90 TABLET | Refills: 1 | Status: CANCELLED | OUTPATIENT
Start: 2023-06-09

## 2023-06-09 RX ADMIN — METHYLPREDNISOLONE ACETATE 40 MG: 40 INJECTION, SUSPENSION INTRA-ARTICULAR; INTRALESIONAL; INTRAMUSCULAR; SOFT TISSUE at 09:06

## 2023-06-09 ASSESSMENT — ENCOUNTER SYMPTOMS
EYE REDNESS: 0
CHEST TIGHTNESS: 0
COLOR CHANGE: 1
EYE PAIN: 0
COUGH: 0
RHINORRHEA: 0
WHEEZING: 0
PHOTOPHOBIA: 0
BACK PAIN: 0
SORE THROAT: 0
NAUSEA: 0
BLOOD IN STOOL: 0
CONSTIPATION: 0
SHORTNESS OF BREATH: 0
ABDOMINAL PAIN: 0
VOMITING: 0
EYE DISCHARGE: 0
DIARRHEA: 0

## 2023-06-29 DIAGNOSIS — M79.671 RIGHT FOOT PAIN: ICD-10-CM

## 2023-06-29 DIAGNOSIS — E79.0 HYPERURICEMIA: ICD-10-CM

## 2023-06-29 RX ORDER — PROBENECID 500 MG/1
TABLET, FILM COATED ORAL
Qty: 60 TABLET | Refills: 1 | OUTPATIENT
Start: 2023-06-29

## 2023-06-29 NOTE — TELEPHONE ENCOUNTER
Refill for probenecid denid, rx was sent on 6-9-23.  Patient has follow up appt with Dr Mickey Lopez on 7-12-23

## 2023-07-12 ENCOUNTER — OFFICE VISIT (OUTPATIENT)
Dept: INTERNAL MEDICINE CLINIC | Age: 63
End: 2023-07-12
Payer: COMMERCIAL

## 2023-07-12 VITALS
WEIGHT: 256 LBS | SYSTOLIC BLOOD PRESSURE: 120 MMHG | DIASTOLIC BLOOD PRESSURE: 80 MMHG | RESPIRATION RATE: 18 BRPM | HEART RATE: 92 BPM | BODY MASS INDEX: 33.93 KG/M2 | OXYGEN SATURATION: 96 % | HEIGHT: 73 IN

## 2023-07-12 DIAGNOSIS — E79.0 HYPERURICEMIA: ICD-10-CM

## 2023-07-12 DIAGNOSIS — M79.671 RIGHT FOOT PAIN: ICD-10-CM

## 2023-07-12 DIAGNOSIS — Z00.00 ENCOUNTER FOR WELL ADULT EXAM WITHOUT ABNORMAL FINDINGS: Primary | ICD-10-CM

## 2023-07-12 PROCEDURE — 3079F DIAST BP 80-89 MM HG: CPT | Performed by: GENERAL PRACTICE

## 2023-07-12 PROCEDURE — 99396 PREV VISIT EST AGE 40-64: CPT | Performed by: GENERAL PRACTICE

## 2023-07-12 PROCEDURE — 3074F SYST BP LT 130 MM HG: CPT | Performed by: GENERAL PRACTICE

## 2023-07-12 RX ORDER — PROBENECID 500 MG/1
TABLET, FILM COATED ORAL
Qty: 60 TABLET | Refills: 0 | Status: SHIPPED | OUTPATIENT
Start: 2023-07-12

## 2023-07-12 SDOH — ECONOMIC STABILITY: FOOD INSECURITY: WITHIN THE PAST 12 MONTHS, YOU WORRIED THAT YOUR FOOD WOULD RUN OUT BEFORE YOU GOT MONEY TO BUY MORE.: NEVER TRUE

## 2023-07-12 SDOH — ECONOMIC STABILITY: FOOD INSECURITY: WITHIN THE PAST 12 MONTHS, THE FOOD YOU BOUGHT JUST DIDN'T LAST AND YOU DIDN'T HAVE MONEY TO GET MORE.: NEVER TRUE

## 2023-07-12 SDOH — ECONOMIC STABILITY: HOUSING INSECURITY
IN THE LAST 12 MONTHS, WAS THERE A TIME WHEN YOU DID NOT HAVE A STEADY PLACE TO SLEEP OR SLEPT IN A SHELTER (INCLUDING NOW)?: NO

## 2023-07-12 SDOH — ECONOMIC STABILITY: INCOME INSECURITY: HOW HARD IS IT FOR YOU TO PAY FOR THE VERY BASICS LIKE FOOD, HOUSING, MEDICAL CARE, AND HEATING?: NOT VERY HARD

## 2023-07-12 ASSESSMENT — ENCOUNTER SYMPTOMS
COUGH: 0
STRIDOR: 0
ABDOMINAL DISTENTION: 0
DIARRHEA: 0
BLOOD IN STOOL: 0
VOMITING: 0
ABDOMINAL PAIN: 0
CHOKING: 0
SHORTNESS OF BREATH: 0
NAUSEA: 0
CONSTIPATION: 0
BACK PAIN: 0
CHEST TIGHTNESS: 0

## 2023-07-12 ASSESSMENT — PATIENT HEALTH QUESTIONNAIRE - PHQ9
SUM OF ALL RESPONSES TO PHQ QUESTIONS 1-9: 1
SUM OF ALL RESPONSES TO PHQ9 QUESTIONS 1 & 2: 1
SUM OF ALL RESPONSES TO PHQ QUESTIONS 1-9: 1
SUM OF ALL RESPONSES TO PHQ QUESTIONS 1-9: 1
1. LITTLE INTEREST OR PLEASURE IN DOING THINGS: 0
SUM OF ALL RESPONSES TO PHQ QUESTIONS 1-9: 1
2. FEELING DOWN, DEPRESSED OR HOPELESS: 1

## 2023-07-13 ENCOUNTER — TELEPHONE (OUTPATIENT)
Dept: INTERNAL MEDICINE CLINIC | Age: 63
End: 2023-07-13

## 2023-07-13 PROBLEM — E66.9 OBESITY, CLASS II, BMI 35-39.9: Status: ACTIVE | Noted: 2021-02-09

## 2023-07-13 PROBLEM — E66.812 OBESITY, CLASS II, BMI 35-39.9: Status: ACTIVE | Noted: 2021-02-09

## 2023-07-13 NOTE — TELEPHONE ENCOUNTER
----- Message from Khris Mccord MD sent at 7/13/2023 11:58 AM EDT -----  Regarding: CSP results  Please obtain CSP results from [facility]

## 2023-07-18 DIAGNOSIS — D68.51 FACTOR V LEIDEN (HCC): Primary | ICD-10-CM

## 2023-07-18 NOTE — PROGRESS NOTES
Annual referral renewal. Referring provider is Dr. Kelli Pierre. New referral pended in this encounter. Message sent to provider requesting to sign.     For Pharmacy Admin Tracking Only    Time Spent (min): 5

## 2023-07-22 ENCOUNTER — HOSPITAL ENCOUNTER (OUTPATIENT)
Age: 63
Discharge: HOME | End: 2023-07-22
Payer: COMMERCIAL

## 2023-07-22 PROCEDURE — 36415 COLL VENOUS BLD VENIPUNCTURE: CPT

## 2023-07-22 PROCEDURE — 84550 ASSAY OF BLOOD/URIC ACID: CPT

## 2023-07-22 PROCEDURE — 83036 HEMOGLOBIN GLYCOSYLATED A1C: CPT

## 2023-07-23 LAB
ESTIMATED AVERAGE GLUCOSE: 117 MG/DL
HBA1C MFR BLD: 5.7 % (ref 4.2–6.3)
URATE SERPL-MCNC: 6.1 MG/DL (ref 3.5–7.2)

## 2023-08-15 ENCOUNTER — ANTI-COAG VISIT (OUTPATIENT)
Dept: PHARMACY | Age: 63
End: 2023-08-15
Payer: COMMERCIAL

## 2023-08-15 DIAGNOSIS — D68.2 FACTOR V DEFICIENCY (HCC): Primary | ICD-10-CM

## 2023-08-15 LAB
INR BLD: 2.2
POC INR: 2.2 INDEX
PROTHROMBIN TIME, POC: 26 SECONDS (ref 10–14.3)
PROTIME: 26 SECONDS

## 2023-08-15 PROCEDURE — 99211 OFF/OP EST MAY X REQ PHY/QHP: CPT

## 2023-08-15 PROCEDURE — 85610 PROTHROMBIN TIME: CPT

## 2023-08-15 PROCEDURE — 36416 COLLJ CAPILLARY BLOOD SPEC: CPT

## 2023-08-15 NOTE — PROGRESS NOTES
Medication Management Service  [facility]  [phone]    Visit Date: 8/15/2023   Subjective:       Jayro Vela is a 59 y.o. male who presents to clinic today for anticoagulation monitoring and adjustment. Patient seen in clinic for warfarin management due to  Indication:   factor V Leiden mutation. INR goal: of 2.0-3.0. Duration of therapy: indefinite. Patient reports the following:   Adherent with regimen  Missed or extra doses:  None   Bleeding or thromboembolic side effects:  None  Significant medication changes: 6/9: Prednisone x 10 days - may cause INR elevation - complete  Significant dietary changes: None  Significant alcohol or tobacco changes: None  Significant recent illness, disease state changes, or hospitalization:  None  Upcoming surgeries or procedures:  None  Falls: None           Assessment and Plan     PT/INR done in office per protocol. INR today is 2.2, therapeutic. Plan: Will continue current regimen of warfarin 7.5mg daily. Recheck INR in 10 week(s). Patient verbalized understanding of dosing directions and information discussed. Dosing schedule given to patient including phone number, appointment date, and time. Progress note sent to referring office.     Electronically signed by Jose Trent, [address] [place] [address] on 8/15/23    For Pharmacy Admin Tracking Only    Total # of Interventions Recommended: 0  Total # of Interventions Accepted: 0  Time Spent (min): 15

## 2023-09-07 DIAGNOSIS — M79.671 RIGHT FOOT PAIN: ICD-10-CM

## 2023-09-07 DIAGNOSIS — E79.0 HYPERURICEMIA: ICD-10-CM

## 2023-09-07 RX ORDER — PROBENECID 500 MG/1
TABLET, FILM COATED ORAL
Qty: 60 TABLET | Refills: 0 | Status: SHIPPED | OUTPATIENT
Start: 2023-09-07

## 2023-09-27 ENCOUNTER — TELEPHONE (OUTPATIENT)
Dept: INTERNAL MEDICINE CLINIC | Age: 63
End: 2023-09-27

## 2023-09-27 NOTE — TELEPHONE ENCOUNTER
Pt had his annual wellness exam 07/12 but the appointment code was in as a physical so it is showing he hasn't completed it yet.  So the appt code needs changed

## 2023-10-05 DIAGNOSIS — M79.671 RIGHT FOOT PAIN: ICD-10-CM

## 2023-10-05 DIAGNOSIS — E79.0 HYPERURICEMIA: ICD-10-CM

## 2023-10-05 RX ORDER — PROBENECID 500 MG/1
500 TABLET, FILM COATED ORAL 2 TIMES DAILY
Qty: 60 TABLET | Refills: 5 | Status: SHIPPED | OUTPATIENT
Start: 2023-10-05 | End: 2024-04-02

## 2023-10-23 NOTE — PROGRESS NOTES
Medication Management Service  [facility]  [phone]    Visit Date: 10/24/2023   Subjective:       Jacoby Mcdaniels is a 59 y.o. male who presents to clinic today for anticoagulation monitoring and adjustment. Patient seen in clinic for warfarin management due to  Indication:   factor V Leiden mutation. INR goal: of 2.0-3.0. Duration of therapy: indefinite. Patient reports the following:   Adherent with regimen  Missed or extra doses:  Possible missed dose  Bleeding or thromboembolic side effects:  None  Significant medication changes:  None  Significant dietary changes: Coleslaw (wife made double batch and he is the only one who eats it) - now gone  Significant alcohol or tobacco changes: None  Significant recent illness, disease state changes, or hospitalization:  None  Upcoming surgeries or procedures:  None  Falls: None           Assessment and Plan     PT/INR done in office per protocol. INR today is 1.6, subtherapeutic. Plan:  Take warfarin 11.25mg x 2 days then will continue current regimen of warfarin 7.5mg daily. Recheck INR in 12 week(s). E-Rx sent to [facility] for warfarin 7.5mg tablets #90 with 1 refill    Patient verbalized understanding of dosing directions and information discussed. Dosing schedule given to patient including phone number, appointment date, and time. Progress note sent to referring office.     Electronically signed by Velasquez Way [address] [place] [address] on 10/24/23    For Pharmacy Admin Tracking Only    Intervention Detail: Dose Adjustment: 1, reason: Therapy Optimization and Refill(s) Provided  Total # of Interventions Recommended: 2  Total # of Interventions Accepted: 2  Time Spent (min): 15

## 2023-10-24 ENCOUNTER — ANTI-COAG VISIT (OUTPATIENT)
Dept: PHARMACY | Age: 63
End: 2023-10-24
Payer: COMMERCIAL

## 2023-10-24 DIAGNOSIS — D68.2 FACTOR V DEFICIENCY (HCC): Primary | ICD-10-CM

## 2023-10-24 LAB
INR BLD: 1.6
POC INR: 1.6 INDEX
PROTHROMBIN TIME, POC: 19.2 SECONDS (ref 10–14.3)
PROTIME: 19.2 SECONDS

## 2023-10-24 PROCEDURE — 36416 COLLJ CAPILLARY BLOOD SPEC: CPT

## 2023-10-24 PROCEDURE — 99213 OFFICE O/P EST LOW 20 MIN: CPT

## 2023-10-24 PROCEDURE — 85610 PROTHROMBIN TIME: CPT

## 2023-10-24 RX ORDER — WARFARIN SODIUM 7.5 MG/1
7.5 TABLET ORAL DAILY
Qty: 90 TABLET | Refills: 1 | Status: SHIPPED | OUTPATIENT
Start: 2023-10-24

## 2023-11-16 DIAGNOSIS — E79.0 HYPERURICEMIA: ICD-10-CM

## 2023-11-16 DIAGNOSIS — M79.671 RIGHT FOOT PAIN: ICD-10-CM

## 2023-11-16 RX ORDER — PROBENECID 500 MG/1
500 TABLET, FILM COATED ORAL 2 TIMES DAILY
Qty: 120 TABLET | Refills: 5 | Status: SHIPPED | OUTPATIENT
Start: 2023-11-16 | End: 2024-11-10

## 2023-11-16 NOTE — TELEPHONE ENCOUNTER
Pt called requesting blood work orders to be put in from his last visit, he plans on doing them 12/08.  Wants a call when/if they are put in [phone] can leave message

## 2023-11-16 NOTE — TELEPHONE ENCOUNTER
Pt is requesting a 90 day supply for the next refill so they only need to pay once for the script instead of paying every month

## 2023-11-21 ENCOUNTER — TELEPHONE (OUTPATIENT)
Dept: INTERNAL MEDICINE CLINIC | Age: 63
End: 2023-11-21

## 2023-11-21 DIAGNOSIS — R73.01 IMPAIRED FASTING GLUCOSE: ICD-10-CM

## 2023-11-21 DIAGNOSIS — I10 ESSENTIAL HYPERTENSION: Primary | ICD-10-CM

## 2023-11-21 DIAGNOSIS — E78.00 HYPERCHOLESTEROLEMIA: ICD-10-CM

## 2023-11-21 NOTE — TELEPHONE ENCOUNTER
Patient would like to know if he can get an order for his routine blood work. Patient states he usually gets this done annually this time of year.

## 2023-11-30 ENCOUNTER — HOSPITAL ENCOUNTER (OUTPATIENT)
Age: 63
Discharge: HOME | End: 2023-11-30
Payer: COMMERCIAL

## 2023-11-30 DIAGNOSIS — R73.01 IMPAIRED FASTING GLUCOSE: ICD-10-CM

## 2023-11-30 DIAGNOSIS — E79.0 HYPERURICEMIA: ICD-10-CM

## 2023-11-30 DIAGNOSIS — I10 ESSENTIAL HYPERTENSION: ICD-10-CM

## 2023-11-30 DIAGNOSIS — E78.00 HYPERCHOLESTEROLEMIA: ICD-10-CM

## 2023-11-30 LAB
ALBUMIN SERPL-MCNC: 4.3 GM/DL (ref 3.4–5)
ALP BLD-CCNC: 67 IU/L (ref 40–129)
ALT SERPL-CCNC: 25 U/L (ref 10–40)
ANION GAP SERPL CALCULATED.3IONS-SCNC: 12 MMOL/L (ref 4–16)
AST SERPL-CCNC: 26 IU/L (ref 15–37)
BILIRUB SERPL-MCNC: 0.8 MG/DL (ref 0–1)
BUN SERPL-MCNC: 19 MG/DL (ref 6–23)
CALCIUM SERPL-MCNC: 9.5 MG/DL (ref 8.3–10.6)
CHLORIDE BLD-SCNC: 100 MMOL/L (ref 99–110)
CHOLEST SERPL-MCNC: 176 MG/DL
CO2: 27 MMOL/L (ref 21–32)
CREAT SERPL-MCNC: 1.1 MG/DL (ref 0.9–1.3)
ESTIMATED AVERAGE GLUCOSE: 111 MG/DL
GFR SERPL CREATININE-BSD FRML MDRD: >60 ML/MIN/1.73M2
GLUCOSE SERPL-MCNC: 110 MG/DL (ref 70–99)
HBA1C MFR BLD: 5.5 % (ref 4.2–6.3)
HCT VFR BLD CALC: 51.6 % (ref 42–52)
HDLC SERPL-MCNC: 41 MG/DL
HEMOGLOBIN: 17.3 GM/DL (ref 13.5–18)
LDLC SERPL CALC-MCNC: 99 MG/DL
MCH RBC QN AUTO: 30.9 PG (ref 27–31)
MCHC RBC AUTO-ENTMCNC: 33.5 % (ref 32–36)
MCV RBC AUTO: 92.1 FL (ref 78–100)
PDW BLD-RTO: 12.9 % (ref 11.7–14.9)
PLATELET # BLD: 231 K/CU MM (ref 140–440)
PMV BLD AUTO: 9.3 FL (ref 7.5–11.1)
POTASSIUM SERPL-SCNC: 4.2 MMOL/L (ref 3.5–5.1)
RBC # BLD: 5.6 M/CU MM (ref 4.6–6.2)
SODIUM BLD-SCNC: 139 MMOL/L (ref 135–145)
T4 FREE SERPL-MCNC: 1.08 NG/DL (ref 0.9–1.8)
TOTAL PROTEIN: 7.1 GM/DL (ref 6.4–8.2)
TRIGL SERPL-MCNC: 179 MG/DL
TSH SERPL DL<=0.005 MIU/L-ACNC: 1.1 UIU/ML (ref 0.27–4.2)
URATE SERPL-MCNC: 5.9 MG/DL (ref 3.5–7.2)
WBC # BLD: 9.4 K/CU MM (ref 4–10.5)

## 2023-11-30 PROCEDURE — 84443 ASSAY THYROID STIM HORMONE: CPT

## 2023-11-30 PROCEDURE — 80053 COMPREHEN METABOLIC PANEL: CPT

## 2023-11-30 PROCEDURE — 36415 COLL VENOUS BLD VENIPUNCTURE: CPT

## 2023-11-30 PROCEDURE — 84439 ASSAY OF FREE THYROXINE: CPT

## 2023-11-30 PROCEDURE — 80061 LIPID PANEL: CPT

## 2023-11-30 PROCEDURE — 84550 ASSAY OF BLOOD/URIC ACID: CPT

## 2023-11-30 PROCEDURE — 85027 COMPLETE CBC AUTOMATED: CPT

## 2023-11-30 PROCEDURE — 83036 HEMOGLOBIN GLYCOSYLATED A1C: CPT

## 2023-12-27 DIAGNOSIS — I10 ESSENTIAL HYPERTENSION: ICD-10-CM

## 2023-12-30 RX ORDER — LISINOPRIL AND HYDROCHLOROTHIAZIDE 25; 20 MG/1; MG/1
1 TABLET ORAL 2 TIMES DAILY
Qty: 180 TABLET | Refills: 1 | Status: SHIPPED | OUTPATIENT
Start: 2023-12-30

## 2024-01-16 ENCOUNTER — ANTI-COAG VISIT (OUTPATIENT)
Dept: PHARMACY | Age: 64
End: 2024-01-16
Payer: COMMERCIAL

## 2024-01-16 DIAGNOSIS — D68.2 FACTOR V DEFICIENCY (HCC): Primary | ICD-10-CM

## 2024-01-16 PROCEDURE — 99211 OFF/OP EST MAY X REQ PHY/QHP: CPT

## 2024-01-16 PROCEDURE — 36416 COLLJ CAPILLARY BLOOD SPEC: CPT

## 2024-01-16 PROCEDURE — 85610 PROTHROMBIN TIME: CPT

## 2024-01-16 NOTE — PROGRESS NOTES
Medication Management Service  [facility]  [phone]    Visit Date: 1/16/2024   Subjective:       Javier Nieves is a 64 y.o. male who presents to clinic today for anticoagulation monitoring and adjustment.    Patient seen in clinic for warfarin management due to  Indication:   factor V Leiden mutation.   INR goal: of 2.0-3.0.  Duration of therapy: indefinite.    Patient reports the following:   Adherent with regimen  Missed or extra doses:  None   Bleeding or thromboembolic side effects:  None  Significant medication changes:  None  Significant dietary changes: None  Significant alcohol or tobacco changes: None  Significant recent illness, disease state changes, or hospitalization:  None  Upcoming surgeries or procedures:  None  Falls: None           Assessment and Plan     PT/INR done in office per protocol.   INR today is 2.5, therapeutic.      Plan:  Will continue current regimen of warfarin 7.5mg daily.     Recheck INR in 12 week(s).     Patient verbalized understanding of dosing directions and information discussed. Dosing schedule given to patient including phone number, appointment date, and time. Progress note sent to referring office.    Electronically signed by Nkechi Parisi RPH on 1/16/24    For Pharmacy Admin Tracking Only    Total # of Interventions Recommended: 0  Total # of Interventions Accepted: 0  Time Spent (min): 15

## 2024-04-09 ENCOUNTER — ANTI-COAG VISIT (OUTPATIENT)
Dept: PHARMACY | Age: 64
End: 2024-04-09
Payer: COMMERCIAL

## 2024-04-09 DIAGNOSIS — D68.2 FACTOR V DEFICIENCY (HCC): Primary | ICD-10-CM

## 2024-04-09 PROCEDURE — 85610 PROTHROMBIN TIME: CPT

## 2024-04-09 PROCEDURE — 99212 OFFICE O/P EST SF 10 MIN: CPT

## 2024-04-09 PROCEDURE — 36416 COLLJ CAPILLARY BLOOD SPEC: CPT

## 2024-04-09 RX ORDER — WARFARIN SODIUM 7.5 MG/1
7.5 TABLET ORAL DAILY
Qty: 90 TABLET | Refills: 1 | Status: SHIPPED | OUTPATIENT
Start: 2024-04-09

## 2024-04-09 NOTE — PROGRESS NOTES
Medication Management Service  [facility]  [phone]    Visit Date: 4/9/2024   Subjective:       Javier Nieves is a 64 y.o. male who presents to clinic today for anticoagulation monitoring and adjustment.    Patient seen in clinic for warfarin management due to  Indication:   factor V Leiden mutation.   INR goal: of 2.0-3.0.  Duration of therapy: indefinite.    Patient reports the following:   Adherent with regimen  Missed or extra doses:  Missed 4/6  Bleeding or thromboembolic side effects:  None  Significant medication changes:  None  Significant dietary changes: None  Significant alcohol or tobacco changes: None  Significant recent illness, disease state changes, or hospitalization:  None  Upcoming surgeries or procedures:  None  Falls: None           Assessment and Plan     PT/INR done in office per protocol.   INR today is 2.2, therapeutic.      Plan:  Will continue current regimen of warfarin 7.5mg daily.     Recheck INR in 12 week(s).     E-Rx sent to [facility] for warfarin 7.5mg tablets #90 with 1 refill    Patient verbalized understanding of dosing directions and information discussed. Dosing schedule given to patient including phone number, appointment date, and time. Progress note sent to referring office.    Electronically signed by Nkechi Parisi RPH on 4/9/24    For Pharmacy Admin Tracking Only    Intervention Detail: Refill(s) Provided  Total # of Interventions Recommended: 1  Total # of Interventions Accepted: 1  Time Spent (min): 15

## 2024-05-20 ENCOUNTER — OFFICE VISIT (OUTPATIENT)
Age: 64
End: 2024-05-20
Payer: COMMERCIAL

## 2024-05-20 VITALS
OXYGEN SATURATION: 95 % | RESPIRATION RATE: 18 BRPM | HEART RATE: 85 BPM | SYSTOLIC BLOOD PRESSURE: 110 MMHG | DIASTOLIC BLOOD PRESSURE: 88 MMHG | BODY MASS INDEX: 34.06 KG/M2 | WEIGHT: 257 LBS | HEIGHT: 73 IN

## 2024-05-20 DIAGNOSIS — M79.671 RIGHT FOOT PAIN: ICD-10-CM

## 2024-05-20 DIAGNOSIS — H93.11 TINNITUS, RIGHT EAR: ICD-10-CM

## 2024-05-20 DIAGNOSIS — D68.2 FACTOR V DEFICIENCY (HCC): ICD-10-CM

## 2024-05-20 DIAGNOSIS — Z71.89 ACP (ADVANCE CARE PLANNING): ICD-10-CM

## 2024-05-20 DIAGNOSIS — E78.1 HYPERTRIGLYCERIDEMIA: ICD-10-CM

## 2024-05-20 DIAGNOSIS — Z00.00 ENCOUNTER FOR WELL ADULT EXAM WITHOUT ABNORMAL FINDINGS: Primary | ICD-10-CM

## 2024-05-20 DIAGNOSIS — D68.51 FACTOR V LEIDEN MUTATION (HCC): ICD-10-CM

## 2024-05-20 DIAGNOSIS — I10 ESSENTIAL HYPERTENSION: ICD-10-CM

## 2024-05-20 DIAGNOSIS — E79.0 HYPERURICEMIA: ICD-10-CM

## 2024-05-20 PROCEDURE — 3074F SYST BP LT 130 MM HG: CPT | Performed by: GENERAL PRACTICE

## 2024-05-20 PROCEDURE — 3079F DIAST BP 80-89 MM HG: CPT | Performed by: GENERAL PRACTICE

## 2024-05-20 PROCEDURE — 99396 PREV VISIT EST AGE 40-64: CPT | Performed by: GENERAL PRACTICE

## 2024-05-20 RX ORDER — WARFARIN SODIUM 7.5 MG/1
7.5 TABLET ORAL DAILY
Qty: 90 TABLET | Refills: 3 | Status: SHIPPED | OUTPATIENT
Start: 2024-05-20

## 2024-05-20 RX ORDER — FLUTICASONE PROPIONATE 50 MCG
SPRAY, SUSPENSION (ML) NASAL
Qty: 3 EACH | Refills: 3 | Status: SHIPPED | OUTPATIENT
Start: 2024-05-20

## 2024-05-20 RX ORDER — LISINOPRIL AND HYDROCHLOROTHIAZIDE 25; 20 MG/1; MG/1
1 TABLET ORAL 2 TIMES DAILY
Qty: 180 TABLET | Refills: 3 | Status: SHIPPED | OUTPATIENT
Start: 2024-05-20

## 2024-05-20 RX ORDER — PROBENECID 500 MG/1
500 TABLET, FILM COATED ORAL 2 TIMES DAILY
Qty: 180 TABLET | Refills: 3 | Status: SHIPPED | OUTPATIENT
Start: 2024-05-20 | End: 2025-05-15

## 2024-05-20 ASSESSMENT — ENCOUNTER SYMPTOMS
CHEST TIGHTNESS: 0
COUGH: 0
ABDOMINAL PAIN: 0
VOMITING: 0
STRIDOR: 0
BLOOD IN STOOL: 0
SHORTNESS OF BREATH: 0
BACK PAIN: 0
NAUSEA: 0

## 2024-05-20 ASSESSMENT — PATIENT HEALTH QUESTIONNAIRE - PHQ9
2. FEELING DOWN, DEPRESSED OR HOPELESS: NOT AT ALL
SUM OF ALL RESPONSES TO PHQ QUESTIONS 1-9: 0
SUM OF ALL RESPONSES TO PHQ QUESTIONS 1-9: 0
1. LITTLE INTEREST OR PLEASURE IN DOING THINGS: NOT AT ALL
SUM OF ALL RESPONSES TO PHQ QUESTIONS 1-9: 0
SUM OF ALL RESPONSES TO PHQ9 QUESTIONS 1 & 2: 0
SUM OF ALL RESPONSES TO PHQ QUESTIONS 1-9: 0

## 2024-05-20 NOTE — PROGRESS NOTES
Well Adult Note  Name: Javier Nieves Today’s Date: 2024   MRN: [id number] Sex: Male   Age: 64 y.o. Ethnicity: Non- / Non    : 1960 Race: White (non-)      Javier Nieves is here for well adult exam.  History:  65y/o M with gout, HTN, arthralgias, mixed dyslipidemia, Factor V Leiden with hypercoagulable state on Coumadin chronically with a goal range 2-3.      Due for CSP , declines RSV vaccination.     Review of Systems   Constitutional:  Negative for activity change, appetite change, chills, diaphoresis, fatigue, fever and unexpected weight change.   HENT:  Negative for hearing loss and nosebleeds.    Respiratory:  Negative for cough, chest tightness, shortness of breath and stridor.    Cardiovascular:  Negative for chest pain, palpitations and leg swelling.   Gastrointestinal:  Negative for abdominal pain, blood in stool, nausea and vomiting.   Endocrine: Negative for cold intolerance and heat intolerance.   Genitourinary:  Negative for difficulty urinating, dysuria, flank pain and hematuria.   Musculoskeletal:  Negative for arthralgias, back pain and myalgias.   Skin:  Negative for rash.   Neurological:  Negative for dizziness, weakness, light-headedness and headaches.   Psychiatric/Behavioral:  Negative for confusion and sleep disturbance. The patient is not nervous/anxious.        Allergies   Allergen Reactions    No Known Allergies          Prior to Visit Medications    Medication Sig Taking? Authorizing Provider   probenecid (BENEMID) 500 MG tablet Take 1 tablet by mouth 2 times daily Yes Bhaskar Stephens MD   lisinopril-hydroCHLOROthiazide (PRINZIDE;ZESTORETIC) 20-25 MG per tablet Take 1 tablet by mouth 2 times daily Yes Bhaskar Stephens MD   fluticasone (FLONASE) 50 MCG/ACT nasal spray SPRAY 1 SPRAY INTO EACH NOSTRIL EVERY DAY Yes Bhaskar Stephens MD   warfarin (COUMADIN) 7.5 MG tablet Take 1 tablet by mouth daily Yes Bhaskar Stephens

## 2024-05-20 NOTE — PATIENT INSTRUCTIONS
smaller stomach can hold less food than before surgery. This causes you to feel full after eating a very small amount of food or liquid. Over time, the pouch might stretch, allowing you to eat more food.   The body absorbs fewer calories, since food bypasses most of the stomach as well as the upper small intestine. This new arrangement seems to decrease your appetite and change how you break down foods by changing the release of various hormones.  Gastric bypass can be performed as open surgery (through an incision on the abdomen) or laparoscopically, which uses smaller incisions and smaller instruments. Both the laparoscopic and open techniques have risks and benefits. You and your surgeon should work together to decide which surgery, if any, is right for you.  Gastric bypass has a high success rate, and people lose an average of 62 to 68 percent of their excess body weight in the first year. Weight loss typically levels off after one to two years, with an overall excess weight loss between 50 and 75 percent. For a person who is 120 pounds overweight, an average of 60 to 90 pounds of weight loss would be expected.  Gastric sleeve -- Gastric sleeve, also known as sleeve gastrectomy, is a surgery that reduces the size of the stomach and makes it into a narrow tube (figure 3). The new stomach is much smaller and produces less of the hormone (ghrelin) that causes hunger, helping you feel satisfied with less food.  Sleeve gastrectomy is safer than gastric bypass because the intestines are not rearranged, and there is less chance of malnutrition. It also appears to control hunger better than lap banding. It might be safer than the lap banding because no foreign materials are used.  The gastric sleeve has a good success rate, and people lose an average of 33 percent of their excess body weight in the first year. For a person who is 120 pounds overweight, this would mean losing about 40 pounds in the first year.  WEIGHT

## 2024-06-24 DIAGNOSIS — D68.51 FACTOR V LEIDEN MUTATION (HCC): Primary | ICD-10-CM

## 2024-06-24 NOTE — PROGRESS NOTES
Annual referral renewal. Referring provider is Dr. Stephens.    New referral pended in this encounter. Message sent to provider requesting to sign.    For Pharmacy Admin Tracking Only    Time Spent (min): 5

## 2024-07-02 ENCOUNTER — ANTI-COAG VISIT (OUTPATIENT)
Dept: PHARMACY | Age: 64
End: 2024-07-02
Payer: COMMERCIAL

## 2024-07-02 DIAGNOSIS — D68.2 FACTOR V DEFICIENCY (HCC): Primary | ICD-10-CM

## 2024-07-02 LAB
INR BLD: 2.8
POC INR: 2.8 INDEX
PROTHROMBIN TIME, POC: 33.4 SECONDS (ref 10–14.3)
PROTIME: 33.4 SECONDS

## 2024-07-02 PROCEDURE — 85610 PROTHROMBIN TIME: CPT

## 2024-07-02 PROCEDURE — 99211 OFF/OP EST MAY X REQ PHY/QHP: CPT

## 2024-07-02 PROCEDURE — 36416 COLLJ CAPILLARY BLOOD SPEC: CPT

## 2024-07-02 NOTE — PROGRESS NOTES
Medication Management Service  [facility]  [phone]    Visit Date: 7/2/2024   Subjective:       Javier Nieves is a 64 y.o. male who presents to clinic today for anticoagulation monitoring and adjustment.    Patient seen in clinic for warfarin management due to  Indication:   factor V Leiden mutation.   INR goal: of 2.0-3.0.  Duration of therapy: indefinite.    Patient reports the following:   Adherent with regimen  Missed or extra doses:  None   Bleeding or thromboembolic side effects:  None  Significant medication changes:  None  Significant dietary changes: None  Significant alcohol or tobacco changes: None  Significant recent illness, disease state changes, or hospitalization:  None  Upcoming surgeries or procedures:  None  Falls: None           Assessment and Plan     PT/INR done in office per protocol.   INR today is 2.8, therapeutic.      Plan:  Will continue current regimen of warfarin 7.5mg daily.     Recheck INR in 12 week(s).     Patient verbalized understanding of dosing directions and information discussed. Dosing schedule given to patient including phone number, appointment date, and time. Progress note sent to referring office.    Electronically signed by Nkechi Parisi RPH on 7/2/24    For Pharmacy Admin Tracking Only    Total # of Interventions Recommended: 0  Total # of Interventions Accepted: 0  Time Spent (min): 15

## 2024-09-24 ENCOUNTER — ANTI-COAG VISIT (OUTPATIENT)
Dept: PHARMACY | Age: 64
End: 2024-09-24
Payer: COMMERCIAL

## 2024-09-24 DIAGNOSIS — D68.2 FACTOR V DEFICIENCY (HCC): Primary | ICD-10-CM

## 2024-09-24 LAB
INTERNATIONAL NORMALIZATION RATIO, POC: 2.4
PROTHROMBIN TIME, POC: 35

## 2024-09-24 PROCEDURE — 99211 OFF/OP EST MAY X REQ PHY/QHP: CPT

## 2024-09-24 PROCEDURE — 85610 PROTHROMBIN TIME: CPT

## 2024-09-25 LAB
POC INR: 2.9 (ref 0.9–1.2)
PROTHROMBIN TIME, POC: 35 SEC (ref 10–14.3)

## 2024-11-25 ENCOUNTER — HOSPITAL ENCOUNTER (OUTPATIENT)
Age: 64
Discharge: HOME | End: 2024-11-25
Payer: COMMERCIAL

## 2024-11-25 LAB
ALBUMIN SERPL-MCNC: 3.9 G/DL (ref 3.4–5)
ALBUMIN/GLOB SERPL: 1.3 {RATIO} (ref 1.1–2.2)
ALP SERPL-CCNC: 74 U/L (ref 40–129)
ALT SERPL-CCNC: 22 U/L (ref 10–40)
ANION GAP SERPL CALCULATED.3IONS-SCNC: 12 MMOL/L (ref 4–16)
AST SERPL-CCNC: 22 U/L (ref 15–37)
BILIRUB SERPL-MCNC: 0.5 MG/DL (ref 0–1)
BUN SERPL-MCNC: 19 MG/DL (ref 6–23)
CALCIUM SERPL-MCNC: 9.5 MG/DL (ref 8.3–10.6)
CHLORIDE SERPL-SCNC: 100 MMOL/L (ref 99–110)
CHOLEST SERPL-MCNC: 145 MG/DL (ref 125–199)
CO2 SERPL-SCNC: 27 MMOL/L (ref 21–32)
CREAT SERPL-MCNC: 1 MG/DL (ref 0.9–1.3)
GFR, ESTIMATED: 85 ML/MIN/1.73M2
GLUCOSE P FAST SERPL-MCNC: 100 MG/DL (ref 70–99)
HDLC SERPL-MCNC: 41 MG/DL
LDLC SERPL CALC-MCNC: 78 MG/DL
POTASSIUM SERPL-SCNC: 4.2 MMOL/L (ref 3.5–5.1)
PROT SERPL-MCNC: 6.9 G/DL (ref 6.4–8.2)
SODIUM SERPL-SCNC: 139 MMOL/L (ref 135–145)
T4 FREE SERPL-MCNC: 1 NG/DL (ref 0.9–1.8)
TRIGL SERPL-MCNC: 131 MG/DL
TSH SERPL DL<=0.05 MIU/L-ACNC: 1.18 UIU/ML (ref 0.27–4.2)
URATE SERPL-MCNC: 5.7 MG/DL (ref 3.5–7.2)

## 2024-11-25 PROCEDURE — 36415 COLL VENOUS BLD VENIPUNCTURE: CPT

## 2024-11-25 PROCEDURE — 80053 COMPREHEN METABOLIC PANEL: CPT

## 2024-11-25 PROCEDURE — 84439 ASSAY OF FREE THYROXINE: CPT

## 2024-11-25 PROCEDURE — 84443 ASSAY THYROID STIM HORMONE: CPT

## 2024-11-25 PROCEDURE — 84550 ASSAY OF BLOOD/URIC ACID: CPT

## 2024-11-25 PROCEDURE — 80061 LIPID PANEL: CPT

## 2024-12-17 ENCOUNTER — ANTI-COAG VISIT (OUTPATIENT)
Dept: PHARMACY | Age: 64
End: 2024-12-17
Payer: COMMERCIAL

## 2024-12-17 DIAGNOSIS — D68.2 FACTOR V DEFICIENCY (HCC): Primary | ICD-10-CM

## 2024-12-17 LAB
INTERNATIONAL NORMALIZATION RATIO, POC: 2.5
POC INR: 2.5 (ref 0.9–1.2)
PROTHROMBIN TIME, POC: 29.9
PROTHROMBIN TIME, POC: 29.9 SEC (ref 10–14.3)

## 2024-12-17 PROCEDURE — 99212 OFFICE O/P EST SF 10 MIN: CPT

## 2024-12-17 PROCEDURE — 85610 PROTHROMBIN TIME: CPT

## 2024-12-17 RX ORDER — WARFARIN SODIUM 7.5 MG/1
7.5 TABLET ORAL DAILY
Qty: 90 TABLET | Refills: 1 | Status: SHIPPED | OUTPATIENT
Start: 2024-12-17

## 2024-12-17 NOTE — PROGRESS NOTES
Medication Management Service  [facility]  [phone]    Visit Date: 12/17/2024   Subjective:       Javier Nieves is a 64 y.o. male who presents to clinic today for anticoagulation monitoring and adjustment.    Patient seen in clinic for warfarin management due to  Indication:   factor V Leiden mutation.   INR goal: of 2.0-3.0.  Duration of therapy: indefinite.    Patient reports the following:   Adherent with regimen  Missed or extra doses:  None   Bleeding or thromboembolic side effects:  None  Significant medication changes:  None  Significant dietary changes: None  Significant alcohol or tobacco changes: None  Significant recent illness, disease state changes, or hospitalization:  None  Upcoming surgeries or procedures:  None  Falls: None           Assessment and Plan     PT/INR done in office per protocol.   INR today is 2.5, therapeutic.      Plan:  Will continue current regimen of warfarin 7.5mg daily.     Recheck INR in 12 week(s).     E-Rx sent to [facility] for warfarin 7.5mg tablets #90 with 1 refill    Patient verbalized understanding of dosing directions and information discussed. Dosing schedule given to patient including phone number, appointment date, and time. Progress note sent to referring office.    Electronically signed by Nkechi Parisi RPH on 12/17/24    For Pharmacy Admin Tracking Only    Intervention Detail: Refill(s) Provided  Total # of Interventions Recommended: 1  Total # of Interventions Accepted: 1  Time Spent (min): 15

## 2025-01-03 ENCOUNTER — TELEPHONE (OUTPATIENT)
Dept: PHARMACY | Age: 65
End: 2025-01-03

## 2025-01-27 NOTE — PROGRESS NOTES
Medication Management Service  [facility]  [phone]    Visit Date: 1/28/2025   Subjective:       Javier Nieves is a 65 y.o. male who presents to clinic today for anticoagulation monitoring and adjustment.    Patient seen in clinic for warfarin management due to  Indication:   factor V Leiden mutation.   INR goal: of 2.0-3.0.  Duration of therapy: indefinite.    Patient reports the following:   Adherent with regimen  Missed or extra doses:  None   Bleeding or thromboembolic side effects:  None  Significant medication changes:  None  Significant dietary changes: None  Significant alcohol or tobacco changes: None  Significant recent illness, disease state changes, or hospitalization:  None  Upcoming surgeries or procedures: Dental procedure 2/3. Per Dr. Eliceo Diaz - Hold warfarin 2/1 - 2/4 and resume on 2/5. Bridge with Lovenox after restart warfarin.   Falls: None           Assessment and Plan     PT/INR done in office per protocol.   INR today is 2.3, therapeutic.      Plan:  Will continue current regimen of warfarin 7.5mg daily. Patient's procedure scheduled for 2/3. Hold warfarin for 2 days prior to procedure. Last dose of warfarin on 1/31.  Bridge with lovenox at 1mg/kg. Patient weight is 116.6kg. Restart warfarin and Lovenox following procedure at direction of physician performing procedure. Start Lovenox 120mg twice daily on 2/5 (none prior to procedure due to only holding warfarin 2 days prior). Continue Lovenox and warfarin 11.25mg x 3 days then 7.5mg daily until INR therapeutic.     Recheck INR in 1 week after restart.     E-Rx sent to [email or web address] for enoxaparin 120mg syringes #16 with no refills    Patient verbalized understanding of dosing directions and information discussed. Dosing schedule given to patient including phone number, appointment date, and time. Progress note sent to referring office.    Electronically signed by Nkechi Parisi RPH on 1/28/25    For Pharmacy Admin Tracking

## 2025-01-28 ENCOUNTER — ANTI-COAG VISIT (OUTPATIENT)
Dept: PHARMACY | Age: 65
End: 2025-01-28
Payer: COMMERCIAL

## 2025-01-28 DIAGNOSIS — D68.2 FACTOR V DEFICIENCY (HCC): Primary | ICD-10-CM

## 2025-01-28 LAB
INTERNATIONAL NORMALIZATION RATIO, POC: 2.3
POC INR: 2.3 (ref 0.9–1.2)
PROTHROMBIN TIME, POC: 27.7
PROTHROMBIN TIME, POC: 27.7 SEC (ref 10–14.3)

## 2025-01-28 PROCEDURE — 99213 OFFICE O/P EST LOW 20 MIN: CPT

## 2025-01-28 PROCEDURE — 85610 PROTHROMBIN TIME: CPT

## 2025-01-28 RX ORDER — ENOXAPARIN SODIUM 150 MG/ML
120 INJECTION SUBCUTANEOUS EVERY 12 HOURS
Qty: 16 EACH | Refills: 0 | Status: SHIPPED | OUTPATIENT
Start: 2025-01-28

## 2025-01-30 ENCOUNTER — TELEPHONE (OUTPATIENT)
Dept: PHARMACY | Age: 65
End: 2025-01-30

## 2025-01-30 NOTE — TELEPHONE ENCOUNTER
Yasemin Smith from [facility] called to clarify patient's plan for bridging with Lovenox for upcoming procedure. She was concerned because patient stated he was not bridging with Lovenox.      After reviewing his plan I returned Yasemin's call to explain that the patient will be bridging with Lovenox but not until after the procedure as patient was stopping warfarin just 2 days prior to procedure.  Patient will resume warfarin and start Lovenox bridging after procedure until INR back within goal.    For Pharmacy Admin Tracking Only    Time Spent (min): 10

## 2025-02-11 ENCOUNTER — ANTI-COAG VISIT (OUTPATIENT)
Dept: PHARMACY | Age: 65
End: 2025-02-11
Payer: COMMERCIAL

## 2025-02-11 DIAGNOSIS — D68.2 FACTOR V DEFICIENCY (HCC): Primary | ICD-10-CM

## 2025-02-11 LAB
INTERNATIONAL NORMALIZATION RATIO, POC: 1.9
POC INR: 1.9 (ref 0.9–1.2)
PROTHROMBIN TIME, POC: 23.2
PROTHROMBIN TIME, POC: 23.2 SEC (ref 10–14.3)

## 2025-02-11 PROCEDURE — 85610 PROTHROMBIN TIME: CPT

## 2025-02-11 PROCEDURE — 99212 OFFICE O/P EST SF 10 MIN: CPT

## 2025-02-11 NOTE — PROGRESS NOTES
Medication Management Service  [facility]  [phone]    Visit Date: 2/11/2025   Subjective:       Javier Nieves is a 65 y.o. male who presents to clinic today for anticoagulation monitoring and adjustment.    Patient seen in clinic for warfarin management due to  Indication:   factor V Leiden mutation.   INR goal: of 2.0-3.0.  Duration of therapy: indefinite.    Patient reports the following:   Adherent with regimen  Missed or extra doses: Held 2/1 - 2/3; restarted 2/4 at direction of dentist  Bleeding or thromboembolic side effects:  None  Significant medication changes: Lovenox thru Sunday - did not take yesterday and refuses to continue  Significant dietary changes: None  Significant alcohol or tobacco changes: None  Significant recent illness, disease state changes, or hospitalization:  None  Upcoming surgeries or procedures:  None  Falls: None           Assessment and Plan     PT/INR done in office per protocol.   INR today is 1.9, slightly below goal range.      Plan: Take warfarin 11.25mg x 1 then continue current regimen of warfarin 7.5mg daily.     Recheck INR in 2 week(s).     Patient verbalized understanding of dosing directions and information discussed. Dosing schedule given to patient including phone number, appointment date, and time. Progress note sent to referring office.    Electronically signed by Nkechi Parisi RPH on 2/11/25    For Pharmacy Admin Tracking Only    Intervention Detail: Dose Adjustment: 1, reason: Therapy Optimization  Total # of Interventions Recommended: 1  Total # of Interventions Accepted: 1  Time Spent (min): 15

## 2025-02-25 ENCOUNTER — ANTI-COAG VISIT (OUTPATIENT)
Dept: PHARMACY | Age: 65
End: 2025-02-25
Payer: COMMERCIAL

## 2025-02-25 DIAGNOSIS — D68.2 FACTOR V DEFICIENCY (HCC): Primary | ICD-10-CM

## 2025-02-25 LAB
INTERNATIONAL NORMALIZATION RATIO, POC: 2.1
POC INR: 2.1 (ref 0.9–1.2)
PROTHROMBIN TIME, POC: 25.1
PROTHROMBIN TIME, POC: 25.1 SEC (ref 10–14.3)

## 2025-02-25 PROCEDURE — 85610 PROTHROMBIN TIME: CPT

## 2025-02-25 PROCEDURE — 99211 OFF/OP EST MAY X REQ PHY/QHP: CPT

## 2025-02-25 NOTE — PROGRESS NOTES
Medication Management Service  [facility]  [phone]    Visit Date: 2/25/2025   Subjective:       Javier Nieves is a 65 y.o. male who presents to clinic today for anticoagulation monitoring and adjustment.    Patient seen in clinic for warfarin management due to  Indication:   factor V Leiden mutation.   INR goal: of 2.0-3.0.  Duration of therapy: indefinite.    Patient reports the following:   Adherent with regimen  Missed or extra doses:  None   Bleeding or thromboembolic side effects:  None  Significant medication changes:  None  Significant dietary changes: None  Significant alcohol or tobacco changes: None  Significant recent illness, disease state changes, or hospitalization:  None  Upcoming surgeries or procedures:  None  Falls: None           Assessment and Plan     PT/INR done in office per protocol.   INR today is 2.1, therapeutic.      Plan:  Will continue current regimen of warfarin 7.5mg daily.     Recheck INR in 12 week(s).     Patient verbalized understanding of dosing directions and information discussed. Dosing schedule given to patient including phone number, appointment date, and time. Progress note sent to referring office.    Electronically signed by Nkechi Parisi RPH on 2/25/25    For Pharmacy Admin Tracking Only    Total # of Interventions Recommended: 0  Total # of Interventions Accepted: 0  Time Spent (min): 15

## 2025-05-18 DIAGNOSIS — D68.2 FACTOR V DEFICIENCY (HCC): Primary | ICD-10-CM

## 2025-05-20 ENCOUNTER — ANTI-COAG VISIT (OUTPATIENT)
Dept: PHARMACY | Age: 65
End: 2025-05-20
Payer: COMMERCIAL

## 2025-05-20 DIAGNOSIS — D68.2 FACTOR V DEFICIENCY (HCC): Primary | ICD-10-CM

## 2025-05-20 LAB
INTERNATIONAL NORMALIZATION RATIO, POC: 2
POC INR: 2 (ref 0.9–1.2)
PROTHROMBIN TIME, POC: 0
PROTHROMBIN TIME, POC: 23.9 SEC (ref 10–14.3)

## 2025-05-20 PROCEDURE — 99212 OFFICE O/P EST SF 10 MIN: CPT

## 2025-05-20 PROCEDURE — 85610 PROTHROMBIN TIME: CPT

## 2025-05-20 RX ORDER — WARFARIN SODIUM 7.5 MG/1
7.5 TABLET ORAL DAILY
Qty: 90 TABLET | Refills: 1 | Status: SHIPPED | OUTPATIENT
Start: 2025-05-20

## 2025-05-20 NOTE — PROGRESS NOTES
Medication Management Service  [facility]  [phone]    Visit Date: 5/20/2025   Subjective:       Javier Nieves is a 65 y.o. male who presents to clinic today for anticoagulation monitoring and adjustment.    Patient seen in clinic for warfarin management due to  Indication:   factor V Leiden mutation.   INR goal: of 2.0-3.0.  Duration of therapy: indefinite.    Patient reports the following:   Adherent with regimen  Missed or extra doses: Missed dose yesterday  Bleeding or thromboembolic side effects:  None  Significant medication changes:  None  Significant dietary changes: None  Significant alcohol or tobacco changes: None  Significant recent illness, disease state changes, or hospitalization:  None  Upcoming surgeries or procedures:  None  Falls: None           Assessment and Plan     PT/INR done in office per protocol.   INR today is 2, therapeutic.      Plan: No booster doses for missed dose yesterday as patient plans to have increased alcohol intake over holiday weekend. Will continue current regimen of warfarin 7.5mg daily.     Recheck INR in 12 week(s).     E-Rx sent to [facility] for warfarin 7.5mg tablets #90 with 1 refill    Patient verbalized understanding of dosing directions and information discussed. Dosing schedule given to patient including phone number, appointment date, and time. Progress note sent to referring office.    Electronically signed by Nkechi Parisi RPH on 5/20/2025    For Pharmacy Admin Tracking Only    Intervention Detail: Refill(s) Provided  Total # of Interventions Recommended: 1  Total # of Interventions Accepted: 1  Time Spent (min): 15

## 2025-05-23 ENCOUNTER — OFFICE VISIT (OUTPATIENT)
Age: 65
End: 2025-05-23
Payer: COMMERCIAL

## 2025-05-23 VITALS — OXYGEN SATURATION: 98 % | DIASTOLIC BLOOD PRESSURE: 72 MMHG | SYSTOLIC BLOOD PRESSURE: 122 MMHG | HEART RATE: 68 BPM

## 2025-05-23 DIAGNOSIS — Z00.00 ENCOUNTER FOR WELL ADULT EXAM WITHOUT ABNORMAL FINDINGS: Primary | ICD-10-CM

## 2025-05-23 DIAGNOSIS — E79.0 HYPERURICEMIA: ICD-10-CM

## 2025-05-23 DIAGNOSIS — D68.51 FACTOR V LEIDEN MUTATION: ICD-10-CM

## 2025-05-23 DIAGNOSIS — M79.671 RIGHT FOOT PAIN: ICD-10-CM

## 2025-05-23 DIAGNOSIS — E78.1 HYPERTRIGLYCERIDEMIA: ICD-10-CM

## 2025-05-23 DIAGNOSIS — L30.9 DERMATITIS: ICD-10-CM

## 2025-05-23 DIAGNOSIS — R73.9 HYPERGLYCEMIA: ICD-10-CM

## 2025-05-23 DIAGNOSIS — H93.11 TINNITUS, RIGHT EAR: ICD-10-CM

## 2025-05-23 DIAGNOSIS — I10 ESSENTIAL HYPERTENSION: ICD-10-CM

## 2025-05-23 PROCEDURE — 3078F DIAST BP <80 MM HG: CPT | Performed by: PHYSICIAN ASSISTANT

## 2025-05-23 PROCEDURE — 3074F SYST BP LT 130 MM HG: CPT | Performed by: PHYSICIAN ASSISTANT

## 2025-05-23 PROCEDURE — 99396 PREV VISIT EST AGE 40-64: CPT | Performed by: PHYSICIAN ASSISTANT

## 2025-05-23 RX ORDER — PROBENECID 500 MG/1
500 TABLET, FILM COATED ORAL 2 TIMES DAILY
Qty: 180 TABLET | Refills: 3 | Status: SHIPPED | OUTPATIENT
Start: 2025-05-23 | End: 2026-05-18

## 2025-05-23 RX ORDER — LISINOPRIL AND HYDROCHLOROTHIAZIDE 20; 25 MG/1; MG/1
1 TABLET ORAL 2 TIMES DAILY
Qty: 180 TABLET | Refills: 3 | Status: SHIPPED | OUTPATIENT
Start: 2025-05-23

## 2025-05-23 RX ORDER — TRIAMCINOLONE ACETONIDE 1 MG/G
CREAM TOPICAL 2 TIMES DAILY PRN
Qty: 80 G | Refills: 2 | Status: SHIPPED | OUTPATIENT
Start: 2025-05-23

## 2025-05-23 ASSESSMENT — ENCOUNTER SYMPTOMS
DIARRHEA: 0
EYE PAIN: 0
NAUSEA: 0
SHORTNESS OF BREATH: 0
PHOTOPHOBIA: 0
COUGH: 0
RHINORRHEA: 0
ABDOMINAL PAIN: 0
CHEST TIGHTNESS: 0
EYE REDNESS: 0
EYE DISCHARGE: 0
WHEEZING: 0
VOMITING: 0
COLOR CHANGE: 0
BLOOD IN STOOL: 0
BACK PAIN: 0
SORE THROAT: 0
CONSTIPATION: 0

## 2025-05-23 NOTE — PROGRESS NOTES
Well Adult Note  Name: Javier Nieves Today’s Date: 2025   MRN: [id number] Sex: Male   Age: 65 y.o. Ethnicity: Non- / Non    : 1960 Race: White (non-)      Javier Nieves is here for a well adult exam.          Assessment & Plan  1. Health maintenance.  Uric acid levels normal since . Cholesterol and triglyceride levels optimal. Fasting blood glucose 100 mg/dL, potential prediabetes. Renal and hepatic functions appropriate. Electrolytes and thyroid function normal. Blood pressure 122/72. Continue current medication regimen. Recheck labs in a few months. Screen for diabetes with A1c. Orders for blood counts, metabolic panel, A1c, and lipid profile. Refills for blood pressure medication and probenecid.    2. Gout history.  No recent flare-ups. Continue probenecid. Uric acid levels stable since . No recheck needed.    3. Hypertension.  Blood pressure well-controlled at 122/72. Continue Prinzide combo pill.    4. Skin issues.  Use Kenalog cream once or twice weekly for red splotches. Advised to use sparingly on face. Prescription with refills sent to pharmacy.    Follow-up in 1 year.    Encounter for well adult exam without abnormal findings  -     CBC with Auto Differential; Future  -     Comprehensive Metabolic Panel; Future  -     Hemoglobin A1C; Future  -     LIPID PANEL; Future  Hyperglycemia  -     Hemoglobin A1C; Future  Hyperuricemia  -     probenecid (BENEMID) 500 MG tablet; Take 1 tablet by mouth 2 times daily, Disp-180 tablet, R-3Normal  Factor V Leiden mutation  Essential hypertension  -     CBC with Auto Differential; Future  -     Comprehensive Metabolic Panel; Future  -     lisinopril-hydroCHLOROthiazide (PRINZIDE;ZESTORETIC) 20-25 MG per tablet; Take 1 tablet by mouth 2 times daily, Disp-180 tablet, R-3Normal  Hypertriglyceridemia  -     LIPID PANEL; Future  Tinnitus, right ear  Right foot pain  -     probenecid (BENEMID) 500 MG tablet; Take 1 tablet by mouth

## 2025-05-23 NOTE — PATIENT INSTRUCTIONS
Thank you for choosing [facility] and Walk In with [facility]ET[facility]!! You will receive a survey in the mail regarding the care you received during your stay. Your input is valuable to us. Our goal is that the care you received was excellent. If we did not meet this goal, please let us know how we can become excellent. We hope that you will definitely recommend us to your friends and family and choose us for your future healthcare needs. There is no greater compliment than a referral.     Have a wonderful day!!  Martita Kidd, DASHA, AEMT, CPI Instructor            Well Visit, Ages 18 to 65: Care Instructions  Well visits can help you stay healthy. Your doctor has checked your overall health and may have suggested ways to take good care of yourself. Your doctor also may have recommended tests. You can help prevent illness with healthy eating, good sleep, vaccinations, regular exercise, and other steps.    Get the tests that you and your doctor decide on. Depending on your age and risks, examples might include screening for diabetes; hepatitis C; HIV; and cervical, breast, lung, and colon cancer. Screening helps find diseases before any symptoms appear.   Eat healthy foods. Choose fruits, vegetables, whole grains, lean protein, and low-fat dairy foods. Limit saturated fat and reduce salt.     Limit alcohol. Men should have no more than 2 drinks a day. Women should have no more than 1. For some people, no alcohol is the best choice.   Exercise. Get at least 30 minutes of exercise on most days of the week. Walking can be a good choice.     Reach and stay at your healthy weight. This will lower your risk for many health problems.   Take care of your mental health. Try to stay connected with friends, family, and community, and find ways to manage stress.     If you're feeling depressed or hopeless, talk to someone. A counselor can help. If you don't have a counselor, talk to your doctor.   Talk to your

## 2025-08-12 ENCOUNTER — TELEPHONE (OUTPATIENT)
Dept: PHARMACY | Age: 65
End: 2025-08-12

## 2025-08-19 ENCOUNTER — ANTI-COAG VISIT (OUTPATIENT)
Dept: PHARMACY | Age: 65
End: 2025-08-19
Payer: COMMERCIAL

## 2025-08-19 DIAGNOSIS — D68.2 FACTOR V DEFICIENCY (HCC): Primary | ICD-10-CM

## 2025-08-19 LAB
INTERNATIONAL NORMALIZATION RATIO, POC: 2.4
POC INR: 2.4 (ref 0.9–1.2)
PROTHROMBIN TIME, POC: NORMAL

## 2025-08-19 PROCEDURE — 85610 PROTHROMBIN TIME: CPT

## 2025-08-19 PROCEDURE — 99211 OFF/OP EST MAY X REQ PHY/QHP: CPT

## (undated) DEVICE — Z INACTIVE USE 2735373 APPLICATOR FBR LAIN COT WOOD TIP ECONOMICAL

## (undated) DEVICE — BAG SPEC REM 224ML W4XL6IN DIA10MM 1 HND GYN DISP ENDOPCH

## (undated) DEVICE — SUTURE PROL SZ 2-0 L30IN NONABSORBABLE BLU L26MM SH 1/2 CIR 8833H

## (undated) DEVICE — ADHESIVE SKIN CLSR 0.7ML TOP DERMBND ADV

## (undated) DEVICE — BLADE CLIPPER GEN PURP NS

## (undated) DEVICE — 34" SINGLE PATIENT USE HOVERMATT BREATHABLE: Brand: SINGLE PATIENT USE HOVERMATT

## (undated) DEVICE — YANKAUER,FLEXIBLE HANDLE,REGLR CAPACITY: Brand: MEDLINE INDUSTRIES, INC.

## (undated) DEVICE — TROCAR: Brand: KII FIOS FIRST ENTRY

## (undated) DEVICE — SET TBNG DISP TIP FOR AHTO

## (undated) DEVICE — SEALER ENDOSCP L37CM NANO COAT BLNT TIP LAP DIV

## (undated) DEVICE — APPLICATOR MEDICATED 26 CC SOLUTION HI LT ORNG CHLORAPREP

## (undated) DEVICE — TOWEL,OR,DSP,ST,BLUE,STD,6/PK,12PK/CS: Brand: MEDLINE

## (undated) DEVICE — LOOP LIG SUT SZ 0 L18IN ABSRB POLYDIOXANONE MFIL PDS II

## (undated) DEVICE — SUTURE VCRL SZ 4-0 L18IN ABSRB UD L19MM PS-2 3/8 CIR PRIM J496H

## (undated) DEVICE — TAPE,CLOTH/SILK,CURAD,3"X10YD,LF,40/CS: Brand: CURAD

## (undated) DEVICE — SUTURE SZ 0 27IN 5/8 CIR UR-6  TAPER PT VIOLET ABSRB VICRYL J603H

## (undated) DEVICE — GLOVE SURG SZ 65 THK91MIL LTX FREE SYN POLYISOPRENE

## (undated) DEVICE — TUBING INSUFFLATOR HEAT HI FLO SET PNEUMOCLEAR

## (undated) DEVICE — SOLUTION IV 1000ML 0.9% SOD CHL FOR IRRIG PLAS CONT

## (undated) DEVICE — TROCARS: Brand: KII® BALLOON BLUNT TIP SYSTEM

## (undated) DEVICE — ELECTRODE ES AD CRDLSS PT RET REM POLYHESIVE

## (undated) DEVICE — TROCAR: Brand: KII® SLEEVE

## (undated) DEVICE — DISCONTINUED USE 111569 BF APPLICATOR COTN TIP 6IN ST